# Patient Record
Sex: FEMALE | Race: WHITE | Employment: FULL TIME | URBAN - METROPOLITAN AREA
[De-identification: names, ages, dates, MRNs, and addresses within clinical notes are randomized per-mention and may not be internally consistent; named-entity substitution may affect disease eponyms.]

---

## 2017-01-24 ENCOUNTER — TRANSFERRED RECORDS (OUTPATIENT)
Dept: FAMILY MEDICINE | Facility: CLINIC | Age: 37
End: 2017-01-24

## 2017-02-07 ENCOUNTER — TRANSFERRED RECORDS (OUTPATIENT)
Dept: FAMILY MEDICINE | Facility: CLINIC | Age: 37
End: 2017-02-07

## 2017-04-07 ENCOUNTER — TRANSFERRED RECORDS (OUTPATIENT)
Dept: FAMILY MEDICINE | Facility: CLINIC | Age: 37
End: 2017-04-07

## 2017-04-10 DIAGNOSIS — Z76.0 ENCOUNTER FOR MEDICATION REFILL: ICD-10-CM

## 2017-04-10 RX ORDER — LEVOTHYROXINE SODIUM 100 UG/1
TABLET ORAL
Qty: 30 TABLET | Refills: 0 | Status: SHIPPED | OUTPATIENT
Start: 2017-04-10 | End: 2017-05-17

## 2017-05-17 DIAGNOSIS — Z76.0 ENCOUNTER FOR MEDICATION REFILL: ICD-10-CM

## 2017-05-19 RX ORDER — LEVOTHYROXINE SODIUM 100 UG/1
TABLET ORAL
Qty: 90 TABLET | Refills: 0 | Status: SHIPPED | OUTPATIENT
Start: 2017-05-19 | End: 2017-08-08

## 2017-08-03 NOTE — PROGRESS NOTES
Last TSH : 4.660 ulU/mL on 9/27/2016          2.050 ulU/mL on 2/24/2016          8.78 ulU/mL on 7/31/2014    Currently taking levothyroxine 100 mcg per day.      SUBJECTIVE:                                                    Paulina Diana is a 37 year old female who presents to clinic today for the following health issues:   day off    Obese white female    Hypothyroidism Follow-up      Since last visit, patient describes the following symptoms: Weight stable, no hair loss, no skin changes, no constipation, no loose stools      Amount of exercise or physical activity: 4-5 days/week for an average of 45-60 minutes    Problems taking medications regularly: No    Medication side effects: none    Diet: low fat/cholesterol, low carb diet for weight loss   me    Mood stable  Sleep good 6 hours  Appetite ok  Exercise above    Smoker tried years ago only 3 cig  Etoh occasional/rare  Street drugs/mj no  Caffeine diet coke one can per day    Hobbies: [employer], dog walking, garden    Mole Growth       Duration: x entire life    Description (location/character/radiation): right upper forehead (had it all my life. Other family members also have something similar. Birthmark?) H/o moles removed between shoulder blades and something that needed better margions and cryo of other lesions on arms    Intensity:  Increasing in size since July 2017     Accompanying signs and symptoms: no other sxs     History (similar episodes/previous evaluation): history of mole removals     Precipitating or alleviating factors: none    Therapies tried and outcome: None                 Problem list and histories reviewed & adjusted, as indicated.  Additional history: as documented    Patient Active Problem List   Diagnosis     Atypical nevus     Hypothyroidism     Indication for care in labor and delivery, antepartum     Acquired hypothyroidism     Past Surgical History:   Procedure Laterality Date     DILATION AND CURETTAGE  "SUCTION N/A 5/27/2016    Procedure: DILATION AND CURETTAGE SUCTION;  Surgeon: Natacha Goyal MD;  Location: [facility]     ENT SURGERY      septal repair     LAPAROSCOPIC SALPINGO-OOPHORECTOMY  11/2/2013    Procedure: LAPAROSCOPIC SALPINGO-OOPHORECTOMY;  Single port laparoscopic, Evacuation of hemo-peritonium and Products of Conception;  Surgeon: Peggy Thornton MD;  Location:  OR     NOSE SURGERY      age 12       Social History   Substance Use Topics     Smoking status: Never Smoker     Smokeless tobacco: Never Used      Comment:  on occasion     Alcohol use No      Comment: rare     No family history on file.      Current Outpatient Prescriptions   Medication Sig Dispense Refill     levothyroxine (SYNTHROID/LEVOTHROID) 100 MCG tablet TAKE 1 TABLET BY MOUTH EVERY DAY 90 tablet 0     metFORMIN (GLUCOPHAGE) 1000 MG tablet Take 1,000 mg by mouth 2 times daily (with meals)  8     levothyroxine (SYNTHROID,LEVOTHROID) 100 MCG tablet Take 1 tablet (100 mcg) by mouth daily 30 tablet 0     Allergies   Allergen Reactions     Ceclor Cd [Cefaclor Monohydrate] Hives     Recent Labs   Lab Test  09/27/16   1648  05/15/16   1421  11/15/13   1415  11/01/13   2130   ALT  18   --   30  36   CR  0.85  0.74  0.84  0.86   GFRESTIMATED   --   89  79  77   GFRESTBLACK   --   >90   GFR Calc    >90  >90   POTASSIUM  4.3  3.8  3.7  3.3*      BP Readings from Last 3 Encounters:   08/08/17 120/80   09/27/16 120/84   07/29/16 126/78    Wt Readings from Last 3 Encounters:   05/27/16 119 kg (262 lb 4.8 oz)   05/15/16 108.9 kg (240 lb)   11/22/13 115.7 kg (255 lb)       Estimated body mass index is 43.65 kg/(m^2) as calculated from the following:    Height as of 5/27/16: 1.651 m (5' 5\").    Weight as of 5/27/16: 119 kg (262 lb 4.8 oz).  Weight loss is recommended           Labs reviewed in EPIC          Reviewed and updated as needed this visit by clinical staff     Reviewed and updated as needed this visit by Provider     " "    ROS:  Constitutional, HEENT, cardiovascular, pulmonary, GI, , musculoskeletal, neuro, skin, endocrine and psych systems are negative, except as otherwise noted.      OBJECTIVE:   /80  Pulse 68  Temp 98  F (36.7  C) (Oral)  Resp 16  Ht 1.651 m (5' 5\")  SpO2 98%  There is no height or weight on file to calculate BMI.  GENERAL: healthy, alert and no distress  EYES: Eyes grossly normal to inspection, PERRL and conjunctivae and sclerae normal  HENT: ear canals and TM's normal, nose and mouth without ulcers or lesions  NECK: no adenopathy, no asymmetry, masses, or scars and thyroid normal to palpation  RESP: lungs clear to auscultation - no rales, rhonchi or wheezes  CV: regular rate and rhythm, normal S1 S2, no S3 or S4, no murmur, click or rub, no peripheral edema and peripheral pulses strong  ABDOMEN: soft, nontender, no hepatosplenomegaly, no masses and bowel sounds normal  MS: no gross musculoskeletal defects noted, no edema  SKIN: no suspicious lesions or rashes Dermatofibroma right forehead, multiple benign looking nevi on back, chest, extremities  NEURO: Normal strength and tone, mentation intact and speech normal  PSYCH: mentation appears normal, affect normal/bright  LYMPH: no cervical, supraclavicular, axillary, or inguinal adenopathy    Diagnostic Test Results:  Results for orders placed or performed in visit on 10/11/16   Stool Culture ([facility])   Result Value Ref Range    Salmonella/Shigella Screen Final report     Result 1 Comment     Campylobacter Culture Final report     Result 1 Comment     E. Coli Shiga Toxin EIA Stool Negative Negative    Narrative    Performed at:  01 - [facility] [address], [place], [place]  [id number]  : Cheng Moran MD, Phone:  [phone]   C difficile Toxins A+B  EIA ([facility])   Result Value Ref Range    C. Difficile Toxin A & B, EIA Negative Negative    Narrative    Performed at:  01 - [facility] [address], [place], [place]  " "[id number]  : Cheng Moran MD, Phone:  [phone]   Ova + Parasite Exam ([facility])   Result Value Ref Range    Ova + Parasite Exam Final report     Result 1 Comment     Narrative    Performed at:  02 - [facility] [address] [address], [place], [place]  [id number]  : DEIRDRE Ospina MD, Phone:  [phone]     LMP June 12th PCOS irregular  Pap UTD  No children living  ASSESSMENT/PLAN:     ASSESSMENT / PLAN:  (E03.9) Acquired hypothyroidism  (primary encounter diagnosis)  Comment:   Plan: TSH ([facility]), VENOUS COLLECTION            (Z76.0) Encounter for medication refill  Comment:   Plan: levothyroxine (SYNTHROID/LEVOTHROID) 100 MCG         tablet, VENOUS COLLECTION            (D23.30) Dermatofibroma of face  Comment:   Plan: skin cares sunscreen, hats etc. See dermatology if changing more.    (D22.9) Melanocytic nevus, unspecified location  Comment: many benign looking nevus  Plan: as above        Patient Instructions   Lab today  Refill Thyroid medicine    Estimated body mass index is 43.65 kg/(m^2) as calculated from the following:    Height as of 5/27/16: 1.651 m (5' 5\").    Weight as of 5/27/16: 119 kg (262 lb 4.8 oz).    Watch mole like benign- likely dermatofibroma  Hats and sunscreen        Marissa Zelaya MD  [facility]  "

## 2017-08-08 ENCOUNTER — OFFICE VISIT (OUTPATIENT)
Dept: FAMILY MEDICINE | Facility: CLINIC | Age: 37
End: 2017-08-08

## 2017-08-08 VITALS
TEMPERATURE: 98 F | DIASTOLIC BLOOD PRESSURE: 80 MMHG | HEIGHT: 65 IN | HEART RATE: 68 BPM | OXYGEN SATURATION: 98 % | RESPIRATION RATE: 16 BRPM | SYSTOLIC BLOOD PRESSURE: 120 MMHG

## 2017-08-08 DIAGNOSIS — R79.89 ELEVATED TSH: ICD-10-CM

## 2017-08-08 DIAGNOSIS — D23.30 DERMATOFIBROMA OF FACE: ICD-10-CM

## 2017-08-08 DIAGNOSIS — Z76.0 ENCOUNTER FOR MEDICATION REFILL: ICD-10-CM

## 2017-08-08 DIAGNOSIS — E03.9 ACQUIRED HYPOTHYROIDISM: Primary | ICD-10-CM

## 2017-08-08 DIAGNOSIS — D22.9 MELANOCYTIC NEVUS, UNSPECIFIED LOCATION: ICD-10-CM

## 2017-08-08 PROCEDURE — 99213 OFFICE O/P EST LOW 20 MIN: CPT | Performed by: FAMILY MEDICINE

## 2017-08-08 PROCEDURE — 36415 COLL VENOUS BLD VENIPUNCTURE: CPT | Performed by: FAMILY MEDICINE

## 2017-08-08 RX ORDER — LEVOTHYROXINE SODIUM 100 UG/1
100 TABLET ORAL DAILY
Qty: 90 TABLET | Refills: 3 | Status: SHIPPED | OUTPATIENT
Start: 2017-08-08 | End: 2018-01-06

## 2017-08-08 NOTE — MR AVS SNAPSHOT
"              After Visit Summary   8/8/2017    Paulina Diana    MRN: [id number]           Patient Information     Date Of Birth          1980        Visit Information        Provider Department      8/8/2017 11:15 AM Marissa Zelaya MD [facility]        Today's Diagnoses     Acquired hypothyroidism    -  1    Encounter for medication refill          Care Instructions    Lab today  Refill Thyroid medicine    Estimated body mass index is 43.65 kg/(m^2) as calculated from the following:    Height as of 5/27/16: 1.651 m (5' 5\").    Weight as of 5/27/16: 119 kg (262 lb 4.8 oz).    Watch mole like benign- likely dermatofibroma  Hats and sunscreen            Follow-ups after your visit        Who to contact     If you have questions or need follow up information about today's clinic visit or your schedule please contact [facility] directly at [phone].  Normal or non-critical lab and imaging results will be communicated to you by [employer], letter or phone within 4 business days after the clinic has received the results. If you do not hear from us within 7 days, please contact the clinic through [employer] or phone. If you have a critical or abnormal lab result, we will notify you by phone as soon as possible.  Submit refill requests through [employer] or call your pharmacy and they will forward the refill request to us. Please allow 3 business days for your refill to be completed.          Additional Information About Your Visit        [employer] Information     [employer] gives you secure access to your electronic health record. If you see a primary care provider, you can also send messages to your care team and make appointments. If you have questions, please call your primary care clinic.  If you do not have a primary care provider, please call [phone] and they will assist you.        Care EveryWhere ID     This is your Care EveryWhere ID. This could be used by other organizations to access " "your [place] medical records  [id number]        Your Vitals Were     Pulse Temperature Respirations Height Pulse Oximetry       68 98  F (36.7  C) (Oral) 16 1.651 m (5' 5\") 98%        Blood Pressure from Last 3 Encounters:   08/08/17 120/80   09/27/16 120/84   07/29/16 126/78    Weight from Last 3 Encounters:   05/27/16 119 kg (262 lb 4.8 oz)   05/15/16 108.9 kg (240 lb)   11/22/13 115.7 kg (255 lb)              We Performed the Following     TSH ([facility])          Today's Medication Changes          These changes are accurate as of: 8/8/17 11:41 AM.  If you have any questions, ask your nurse or doctor.               These medicines have changed or have updated prescriptions.        Dose/Directions    * levothyroxine 100 MCG tablet   Commonly known as:  SYNTHROID/LEVOTHROID   This may have changed:  Another medication with the same name was changed. Make sure you understand how and when to take each.   Used for:  Issue of repeat prescription   Changed by:  Ismael Ribera MD        Dose:  100 mcg   Take 1 tablet (100 mcg) by mouth daily   Quantity:  30 tablet   Refills:  0       * levothyroxine 100 MCG tablet   Commonly known as:  SYNTHROID/LEVOTHROID   This may have changed:  See the new instructions.   Used for:  Encounter for medication refill   Changed by:  Marissa Zelaya MD        Dose:  100 mcg   Take 1 tablet (100 mcg) by mouth daily   Quantity:  90 tablet   Refills:  3       * Notice:  This list has 2 medication(s) that are the same as other medications prescribed for you. Read the directions carefully, and ask your doctor or other care provider to review them with you.         Where to get your medicines      These medications were sent to [facility] [id number] [place] [address] [address] AT [facility] [address] [facility] [address]     Phone:  [phone]     levothyroxine 100 MCG tablet                Primary Care Provider Office Phone # Fax #    " Ismael Ribera -[phone]       [facility] [address]  [facility] [address]        Equal Access to Services     BRANT CHUN : Zainab acrol goode anshu Moreau, wanaseemda luqeliza, qaybta kaalmada brennon, mo gamez laanuelmelinda debra. So [facility] [phone].    ATENCIÓN: Si habla español, tiene a thompson disposición servicios gratuitos de asistencia lingüística. Pawel al [phone].    We comply with applicable federal civil rights laws and [place] laws. We do not discriminate on the basis of race, color, national origin, age, disability sex, sexual orientation or gender identity.            Thank you!     Thank you for choosing [facility]  for your care. Our goal is always to provide you with excellent care. Hearing back from our patients is one way we can continue to improve our services. Please take a few minutes to complete the written survey that you may receive in the mail after your visit with us. Thank you!             Your Updated Medication List - Protect others around you: Learn how to safely use, store and throw away your medicines at www.disposemymeds.org.          This list is accurate as of: 8/8/17 11:41 AM.  Always use your most recent med list.                   Brand Name Dispense Instructions for use Diagnosis    * levothyroxine 100 MCG tablet    SYNTHROID/LEVOTHROID    30 tablet    Take 1 tablet (100 mcg) by mouth daily    Issue of repeat prescription       * levothyroxine 100 MCG tablet    SYNTHROID/LEVOTHROID    90 tablet    Take 1 tablet (100 mcg) by mouth daily    Encounter for medication refill       metFORMIN 1000 MG tablet    GLUCOPHAGE     Take 1,000 mg by mouth 2 times daily (with meals)        * Notice:  This list has 2 medication(s) that are the same as other medications prescribed for you. Read the directions carefully, and ask your doctor or other care provider to review them with you.

## 2017-08-08 NOTE — PATIENT INSTRUCTIONS
"Lab today  Refill Thyroid medicine    Estimated body mass index is 43.65 kg/(m^2) as calculated from the following:    Height as of 5/27/16: 1.651 m (5' 5\").    Weight as of 5/27/16: 119 kg (262 lb 4.8 oz).    Watch mole like benign- likely dermatofibroma  Hats and sunscreen    "

## 2017-08-09 LAB — TSH BLD-ACNC: 14.99 UIU/ML (ref 0.45–4.5)

## 2017-08-12 LAB — T4 FREE SERPL-MCNC: 0.78 NG/DL (ref 0.82–1.77)

## 2017-09-06 DIAGNOSIS — Z76.0 ENCOUNTER FOR MEDICATION REFILL: ICD-10-CM

## 2017-09-07 RX ORDER — LEVOTHYROXINE SODIUM 100 UG/1
TABLET ORAL
Qty: 90 TABLET | Refills: 0 | Status: SHIPPED | OUTPATIENT
Start: 2017-09-07 | End: 2018-01-08

## 2018-01-05 ENCOUNTER — OFFICE VISIT (OUTPATIENT)
Dept: URGENT CARE | Facility: URGENT CARE | Age: 38
End: 2018-01-05
Payer: COMMERCIAL

## 2018-01-05 VITALS
OXYGEN SATURATION: 100 % | HEART RATE: 90 BPM | TEMPERATURE: 98.5 F | RESPIRATION RATE: 18 BRPM | WEIGHT: 277.5 LBS | BODY MASS INDEX: 46.18 KG/M2 | DIASTOLIC BLOOD PRESSURE: 89 MMHG | SYSTOLIC BLOOD PRESSURE: 134 MMHG

## 2018-01-05 DIAGNOSIS — M79.10 MYALGIA: Primary | ICD-10-CM

## 2018-01-05 DIAGNOSIS — Z33.1 PREGNANCY, INCIDENTAL: ICD-10-CM

## 2018-01-05 DIAGNOSIS — E03.9 ACQUIRED HYPOTHYROIDISM: ICD-10-CM

## 2018-01-05 LAB
ALBUMIN SERPL-MCNC: 3.2 G/DL (ref 3.4–5)
ALP SERPL-CCNC: 71 U/L (ref 40–150)
ALT SERPL W P-5'-P-CCNC: 15 U/L (ref 0–50)
ANION GAP SERPL CALCULATED.3IONS-SCNC: 6 MMOL/L (ref 3–14)
AST SERPL W P-5'-P-CCNC: 17 U/L (ref 0–45)
BASOPHILS # BLD AUTO: 0 10E9/L (ref 0–0.2)
BASOPHILS NFR BLD AUTO: 0.4 %
BILIRUB SERPL-MCNC: 0.3 MG/DL (ref 0.2–1.3)
BUN SERPL-MCNC: 12 MG/DL (ref 7–30)
CALCIUM SERPL-MCNC: 8.2 MG/DL (ref 8.5–10.1)
CHLORIDE SERPL-SCNC: 107 MMOL/L (ref 94–109)
CO2 SERPL-SCNC: 28 MMOL/L (ref 20–32)
CREAT SERPL-MCNC: 0.75 MG/DL (ref 0.52–1.04)
DIFFERENTIAL METHOD BLD: ABNORMAL
EOSINOPHIL # BLD AUTO: 0.3 10E9/L (ref 0–0.7)
EOSINOPHIL NFR BLD AUTO: 2.4 %
ERYTHROCYTE [DISTWIDTH] IN BLOOD BY AUTOMATED COUNT: 13.9 % (ref 10–15)
ERYTHROCYTE [SEDIMENTATION RATE] IN BLOOD BY WESTERGREN METHOD: 32 MM/H (ref 0–20)
GFR SERPL CREATININE-BSD FRML MDRD: 88 ML/MIN/1.7M2
GLUCOSE SERPL-MCNC: 90 MG/DL (ref 70–99)
HCG SERPL QL: POSITIVE
HCT VFR BLD AUTO: 37.5 % (ref 35–47)
HGB BLD-MCNC: 11.6 G/DL (ref 11.7–15.7)
LYMPHOCYTES # BLD AUTO: 2 10E9/L (ref 0.8–5.3)
LYMPHOCYTES NFR BLD AUTO: 19.2 %
MCH RBC QN AUTO: 29 PG (ref 26.5–33)
MCHC RBC AUTO-ENTMCNC: 30.9 G/DL (ref 31.5–36.5)
MCV RBC AUTO: 94 FL (ref 78–100)
MONOCYTES # BLD AUTO: 0.7 10E9/L (ref 0–1.3)
MONOCYTES NFR BLD AUTO: 6.5 %
NEUTROPHILS # BLD AUTO: 7.6 10E9/L (ref 1.6–8.3)
NEUTROPHILS NFR BLD AUTO: 71.5 %
PLATELET # BLD AUTO: 393 10E9/L (ref 150–450)
POTASSIUM SERPL-SCNC: 4.1 MMOL/L (ref 3.4–5.3)
PROT SERPL-MCNC: 7.1 G/DL (ref 6.8–8.8)
RBC # BLD AUTO: 4 10E12/L (ref 3.8–5.2)
SODIUM SERPL-SCNC: 141 MMOL/L (ref 133–144)
TSH SERPL DL<=0.005 MIU/L-ACNC: 17.79 MU/L (ref 0.4–4)
WBC # BLD AUTO: 10.6 10E9/L (ref 4–11)

## 2018-01-05 PROCEDURE — 86747 PARVOVIRUS ANTIBODY: CPT | Mod: 90 | Performed by: FAMILY MEDICINE

## 2018-01-05 PROCEDURE — 84703 CHORIONIC GONADOTROPIN ASSAY: CPT | Performed by: FAMILY MEDICINE

## 2018-01-05 PROCEDURE — 80050 GENERAL HEALTH PANEL: CPT | Performed by: FAMILY MEDICINE

## 2018-01-05 PROCEDURE — 86618 LYME DISEASE ANTIBODY: CPT | Performed by: FAMILY MEDICINE

## 2018-01-05 PROCEDURE — 36415 COLL VENOUS BLD VENIPUNCTURE: CPT | Performed by: FAMILY MEDICINE

## 2018-01-05 PROCEDURE — 85652 RBC SED RATE AUTOMATED: CPT | Performed by: FAMILY MEDICINE

## 2018-01-05 PROCEDURE — 99214 OFFICE O/P EST MOD 30 MIN: CPT | Performed by: FAMILY MEDICINE

## 2018-01-05 PROCEDURE — 99000 SPECIMEN HANDLING OFFICE-LAB: CPT | Performed by: FAMILY MEDICINE

## 2018-01-05 NOTE — NURSING NOTE
"Chief Complaint   Patient presents with     Urgent Care     Pt states fibromyalgia 3x weeks        Initial /89  Pulse 90  Temp 98.5  F (36.9  C) (Oral)  Resp 18  Wt 277 lb 8 oz (125.9 kg)  SpO2 100%  BMI 46.18 kg/m2 Estimated body mass index is 46.18 kg/(m^2) as calculated from the following:    Height as of 8/8/17: 5' 5\" (1.651 m).    Weight as of this encounter: 277 lb 8 oz (125.9 kg).  Medication Reconciliation: complete      "

## 2018-01-05 NOTE — MR AVS SNAPSHOT
After Visit Summary   1/5/2018    Paulina Diana    MRN: [id number]           Patient Information     Date Of Birth          1980        Visit Information        Provider Department      1/5/2018 4:35 PM Mechelle De Anda MD [facility]        Today's Diagnoses     Myalgia    -  1    Acquired hypothyroidism        Pregnancy, incidental          Care Instructions    -Use Tylenol, only as directed.  -Avoid Ibuprofen and similar medications.  -Please stop taking Metformin (Glucophage) if you are still taking it.  -We will consider increasing your Synthroid once we have verified your current dose.  -Follow up with OB/GYN and Endocrinology early next week, as advised.  -Follow up immediately if:  severe/worsening pain, vaginal bleeding, progressive weakness, or other severe/emergent symptoms,           Follow-ups after your visit        Who to contact     If you have questions or need follow up information about today's clinic visit or your schedule please contact [facility] directly at [phone].  Normal or non-critical lab and imaging results will be communicated to you by [employer], letter or phone within 4 business days after the clinic has received the results. If you do not hear from us within 7 days, please contact the clinic through [employer] or phone. If you have a critical or abnormal lab result, we will notify you by phone as soon as possible.  Submit refill requests through [employer] or call your pharmacy and they will forward the refill request to us. Please allow 3 business days for your refill to be completed.          Additional Information About Your Visit        [employer] Information     [employer] gives you secure access to your electronic health record. If you see a primary care provider, you can also send messages to your care team and make appointments. If you have questions, please call your primary care clinic.  If  you do not have a primary care provider, please call [phone] and they will assist you.        Care EveryWhere ID     This is your Care EveryWhere ID. This could be used by other organizations to access your [place] medical records  [id number]        Your Vitals Were     Pulse Temperature Respirations Pulse Oximetry BMI (Body Mass Index)       90 98.5  F (36.9  C) (Oral) 18 100% 46.18 kg/m2        Blood Pressure from Last 3 Encounters:   01/05/18 134/89   08/08/17 120/80   09/27/16 120/84    Weight from Last 3 Encounters:   01/05/18 277 lb 8 oz (125.9 kg)   05/27/16 262 lb 4.8 oz (119 kg)   05/15/16 240 lb (108.9 kg)              We Performed the Following     CBC with platelets differential     Comprehensive metabolic panel     Erythrocyte sedimentation rate auto     HCG qualitative     Lyme Disease Glo with reflex to WB Serum     Parvovirus B19 antibodies IgG IgM     TSH        Primary Care Provider Office Phone # Fax #    Ismael Ribera [phone] [address]  [facility] [address]        Equal Access to Services     [facility]: Hadii aad ku hadasho Soomaali, waaxda luqadaha, qaybta kaalmada adeegyada, mo walker . So [facility] [phone].    ATENCIÓN: Si habla español, tiene a thompson disposición servicios gratuitos de asistencia lingüística. Pawel al [phone].    We comply with applicable federal civil rights laws and [place] laws. We do not discriminate on the basis of race, color, national origin, age, disability, sex, sexual orientation, or gender identity.            Thank you!     Thank you for choosing [facility]  for your care. Our goal is always to provide you with excellent care. Hearing back from our patients is one way we can continue to improve our services. Please take a few minutes to complete the written survey that you may receive in the mail after your visit with us. Thank you!              Your Updated Medication List - Protect others around you: Learn how to safely use, store and throw away your medicines at www.disposemymeds.org.          This list is accurate as of: 1/5/18 11:59 PM.  Always use your most recent med list.                   Brand Name Dispense Instructions for use Diagnosis    levothyroxine 100 MCG tablet    SYNTHROID/LEVOTHROID    90 tablet    TAKE 1 TABLET BY MOUTH EVERY DAY    Encounter for medication refill       metFORMIN 1000 MG tablet    GLUCOPHAGE     Take 1,000 mg by mouth 2 times daily (with meals)

## 2018-01-06 NOTE — PATIENT INSTRUCTIONS
-Use Tylenol, only as directed.  -Avoid Ibuprofen and similar medications.  -Please stop taking Metformin (Glucophage) if you are still taking it.  -We will consider increasing your Synthroid once we have verified your current dose.  -Follow up with OB/GYN and Endocrinology early next week, as advised.  -Follow up immediately if:  severe/worsening pain, vaginal bleeding, progressive weakness, or other severe/emergent symptoms,

## 2018-01-06 NOTE — PROGRESS NOTES
"CC:    Chief Complaint   Patient presents with     Urgent Care     Pt states fibromyalgia 3x weeks        SUBJECTIVE:  The patient is a 37 year old female, who presents with myalgias, morning stiffness (lasting 1-2 hours each morning), and fatigue.  Symptoms started 3 weeks ago, but they are worse today.  Pain mainly involves the patient's shoulders and hips/outer thighs.  Patient is typically OK after she \"gets going\" and takes Ibuprofen, but she decided to present to the Urgent Care today, as her  had to help her out of bed.  Patient feels subjectively weak at times, secondary to pain.  No specific weakness noted.  Slight cough and nasal congestion currently.  Chronic chills are unchanged.  No fever, nausea, vomiting, headache, neck pain/stiffness, recent sore throat, back pain/stiffness, paresthesias, joint erythema/edema, chest pain, shortness of breath, or bowel or bladder changes.      Patient had some abdominal cramping just prior to the onset of her symptoms 3 weeks ago.  Patient had 2 episodes of abdominal cramping earlier today, lasting < 1 minute/episode.  No vaginal bleeding.  Patient has been sleeping more than usual.  She has a known history of hypothyroidism and PCOS, with thyroid labs last done 08/2017.  Weight has increased 7 pounds in the past 3 weeks, which patient attributes to an increased appetite.  Patient denies social stressors or other anxiety/depression symptoms.  Patient is wondering about the possibility of fibromyalgia.  No personal or family history of rheumatologic or neurologic disease.  No recent travel, vaccines, or exposures.  Patient left work early today, due to her symptoms.  She would like to have labs and a CBC checked.    ROS:  Patient and her  are not taking steps to avoid pregnancy, with LMP ~ 12/01/2017.  Menses have been a bit \"irregular\" recently.    Past Medical History:   Diagnosis Date     Thyroid disease     hypothyroid     History of PCOS, per " patient.    Current Outpatient Prescriptions   Medication Sig Dispense Refill     levothyroxine (SYNTHROID/LEVOTHROID) 100 MCG tablet TAKE 1 TABLET BY MOUTH EVERY DAY 90 tablet 0            metFORMIN (GLUCOPHAGE) 1000 MG tablet Take 1,000 mg by mouth 2 times daily (with meals)  8              Allergies   Allergen Reactions     Ceclor Cd [Cefaclor Monohydrate] Hives     Social History   Substance Use Topics     Smoking status: Never Smoker     Smokeless tobacco: Never Used      Comment:  on occasion     Alcohol use No      Comment: rare       OBJECTIVE:  /89  Pulse 90  Temp 98.5  F (36.9  C) (Oral)  Resp 18  Wt 277 lb 8 oz (125.9 kg)  SpO2 100%  BMI 46.18 kg/m2  GENERAL APPEARANCE:  Awake, alert, and in no acute distress.  PSYCHIATRIC:  Tearful discussing her symptoms, but smiles at times.  No obvious depression or anxiety.  HEENT:  Sclera anicteric.  No conjunctivitis.  PERRLA.  Extraocular movements are intact.  Bilateral TM's and canals are within normal limits.  No obvious nasal congestion.  No erythema, edema, or exudates of the oral mucosa or posterior pharynx.  Mucous membranes moist.  NECK:  Spontaneous full range of motion.  No thyromegaly or mass.  No lymphadenopathy.  No nuchal rigidity or neck tenderness.  HEART:  Normal S1, S2.  Regular rate and rhythm.  No murmurs, rubs, or gallops.  LUNGS:  No respiratory distress.  No wheezes, rales, or rhonchi.  ABDOMEN:  Not distended.  Obese.  Soft.  Not tender.  No mass.  BACK:  Not tender to palpation.  EXTREMITIES:  Moves 4 extremities.   5/5 strength x 4 extremities.  No edema.  NEUROLOGIC:  Gait within normal limits.  2/5 and symmetric reflexes in the upper and lower extremities.  Cranial nerves II-XII grossly intact.  No facial droop or acute neurologic deficits.  SKIN:  No rash or diaphoresis.    IMAGING:  Not performed.    LABORATORY:  Office Visit on 01/05/2018   Component Date Value Ref Range Status     WBC 01/05/2018 10.6  4.0 - 11.0  10e9/L Final     RBC Count 01/05/2018 4.00  3.8 - 5.2 10e12/L Final     Hemoglobin 01/05/2018 11.6* 11.7 - 15.7 g/dL Final     Hematocrit 01/05/2018 37.5  35.0 - 47.0 % Final     MCV 01/05/2018 94  78 - 100 fl Final     MCH 01/05/2018 29.0  26.5 - 33.0 pg Final     MCHC 01/05/2018 30.9* 31.5 - 36.5 g/dL Final     RDW 01/05/2018 13.9  10.0 - 15.0 % Final     Platelet Count 01/05/2018 393  150 - 450 10e9/L Final     Diff Method 01/05/2018 Automated Method   Final     % Neutrophils 01/05/2018 71.5  % Final     % Lymphocytes 01/05/2018 19.2  % Final     % Monocytes 01/05/2018 6.5  % Final     % Eosinophils 01/05/2018 2.4  % Final     % Basophils 01/05/2018 0.4  % Final     Absolute Neutrophil 01/05/2018 7.6  1.6 - 8.3 10e9/L Final     Absolute Lymphocytes 01/05/2018 2.0  0.8 - 5.3 10e9/L Final     Absolute Monocytes 01/05/2018 0.7  0.0 - 1.3 10e9/L Final     Absolute Eosinophils 01/05/2018 0.3  0.0 - 0.7 10e9/L Final     Absolute Basophils 01/05/2018 0.0  0.0 - 0.2 10e9/L Final     Sodium 01/05/2018 141  133 - 144 mmol/L Final     Potassium 01/05/2018 4.1  3.4 - 5.3 mmol/L Final     Chloride 01/05/2018 107  94 - 109 mmol/L Final     Carbon Dioxide 01/05/2018 28  20 - 32 mmol/L Final     Anion Gap 01/05/2018 6  3 - 14 mmol/L Final     Glucose 01/05/2018 90  70 - 99 mg/dL Final     Urea Nitrogen 01/05/2018 12  7 - 30 mg/dL Final     Creatinine 01/05/2018 0.75  0.52 - 1.04 mg/dL Final     GFR Estimate 01/05/2018 88  >60 mL/min/1.7m2 Final    Non  GFR Calc     GFR Estimate If Black 01/05/2018 >90  >60 mL/min/1.7m2 Final    African American GFR Calc     Calcium 01/05/2018 8.2* 8.5 - 10.1 mg/dL Final     Bilirubin Total 01/05/2018 0.3  0.2 - 1.3 mg/dL Final     Albumin 01/05/2018 3.2* 3.4 - 5.0 g/dL Final     Protein Total 01/05/2018 7.1  6.8 - 8.8 g/dL Final     Alkaline Phosphatase 01/05/2018 71  40 - 150 U/L Final     ALT 01/05/2018 15  0 - 50 U/L Final     AST 01/05/2018 17  0 - 45 U/L Final     Sed Rate  01/05/2018 32* 0 - 20 mm/h Final     TSH 01/05/2018 17.79* 0.40 - 4.00 mU/L Final     HCG Qualitative Serum 01/05/2018 Positive* NEG^Negative Final    Comment: This test is for screening purposes.  Results should be interpreted along with   the clinical picture.  Confirmation testing is available if warranted by   ordering [id number], HCG Quantitative Pregnancy.           ASSESSMENT:    1. Myalgias.  Patient has a known history of hypothyroidism and PCOS (on Synthroid), with incidental pregnancy diagnosed this evening.  TSH is elevated (17.79) currently, with mildly elevated ESR 32.  Differential includes (but is not limited to) viral illness and Lyme Disease.  Doubt rhabdomyolysis, given symptom onset 3 weeks ago, with benign Creatinine today.     - CBC with platelets differential     - Parvovirus B19 antibodies IgG IgM - Pending at time of dictation.  - Lyme Disease Glo with reflex to WB Serum - Pending at time of dictation.  - Comprehensive metabolic panel  - Erythrocyte sedimentation rate auto  - TSH  - HCG qualitative    2. Acquired hypothyroidism, on Synthroid.  TSH is currently elevated (17.79).      3. Pregnancy, incidental, with LMP ~ 12/01/2017.     PLAN:  -Discussed risks/benefits of treatment strategies.    Patient Instructions   -Use Tylenol, only as directed.  -Avoid Ibuprofen and similar medications.  -Please stop taking Metformin (Glucophage) if you are still taking it.  -We will consider increasing your Synthroid once we have verified your current dose.  -Follow up with OB/GYN and Endocrinology early next week, as advised.  -Follow up immediately if:  severe/worsening pain, vaginal bleeding, progressive weakness, or other severe/emergent symptoms,     The patient was discharged ambulatory and in stable condition post discussion of follow up.

## 2018-01-07 LAB
B19V IGG SER IA-ACNC: 5.67 IV
B19V IGM SER IA-ACNC: 0.35 IV

## 2018-01-08 ENCOUNTER — TELEPHONE (OUTPATIENT)
Dept: FAMILY MEDICINE | Facility: CLINIC | Age: 38
End: 2018-01-08

## 2018-01-08 ENCOUNTER — TELEPHONE (OUTPATIENT)
Dept: URGENT CARE | Facility: URGENT CARE | Age: 38
End: 2018-01-08

## 2018-01-08 DIAGNOSIS — E03.9 ACQUIRED HYPOTHYROIDISM: Primary | ICD-10-CM

## 2018-01-08 DIAGNOSIS — Z76.0 ENCOUNTER FOR MEDICATION REFILL: ICD-10-CM

## 2018-01-08 LAB — B BURGDOR IGG+IGM SER QL: 0.03 (ref 0–0.89)

## 2018-01-08 RX ORDER — LEVOTHYROXINE SODIUM 125 UG/1
125 TABLET ORAL DAILY
Qty: 30 TABLET | Refills: 3 | Status: SHIPPED | OUTPATIENT
Start: 2018-01-08 | End: 2018-07-12

## 2018-01-08 NOTE — TELEPHONE ENCOUNTER
Inform patient of lab results:    Lyme testing was negative  Parvovirus shows past infection but no antibodies for new or active infection.  Her TSH levels are elevated and show hypothyroidism.  It appears she may have already been prescribed medication for this.    Advised to follow up with OB/GYN due to pregnancy being positive .    Thank you    Trevon Mcmillan [facility] PAC     Results for orders placed or performed in visit on 01/05/18   CBC with platelets differential   Result Value Ref Range    WBC 10.6 4.0 - 11.0 10e9/L    RBC Count 4.00 3.8 - 5.2 10e12/L    Hemoglobin 11.6 (L) 11.7 - 15.7 g/dL    Hematocrit 37.5 35.0 - 47.0 %    MCV 94 78 - 100 fl    MCH 29.0 26.5 - 33.0 pg    MCHC 30.9 (L) 31.5 - 36.5 g/dL    RDW 13.9 10.0 - 15.0 %    Platelet Count 393 150 - 450 10e9/L    Diff Method Automated Method     % Neutrophils 71.5 %    % Lymphocytes 19.2 %    % Monocytes 6.5 %    % Eosinophils 2.4 %    % Basophils 0.4 %    Absolute Neutrophil 7.6 1.6 - 8.3 10e9/L    Absolute Lymphocytes 2.0 0.8 - 5.3 10e9/L    Absolute Monocytes 0.7 0.0 - 1.3 10e9/L    Absolute Eosinophils 0.3 0.0 - 0.7 10e9/L    Absolute Basophils 0.0 0.0 - 0.2 10e9/L   Parvovirus B19 antibodies IgG IgM   Result Value Ref Range    Parvovirus B19 IgG 5.67 (H) <=0.89 IV    Parvovirus B19 IgM 0.35 <=0.89 IV   Lyme Disease Glo with reflex to WB Serum   Result Value Ref Range    Lyme Disease Antibodies Serum 0.03 0.00 - 0.89   Comprehensive metabolic panel   Result Value Ref Range    Sodium 141 133 - 144 mmol/L    Potassium 4.1 3.4 - 5.3 mmol/L    Chloride 107 94 - 109 mmol/L    Carbon Dioxide 28 20 - 32 mmol/L    Anion Gap 6 3 - 14 mmol/L    Glucose 90 70 - 99 mg/dL    Urea Nitrogen 12 7 - 30 mg/dL    Creatinine 0.75 0.52 - 1.04 mg/dL    GFR Estimate 88 >60 mL/min/1.7m2    GFR Estimate If Black >90 >60 mL/min/1.7m2    Calcium 8.2 (L) 8.5 - 10.1 mg/dL    Bilirubin Total 0.3 0.2 - 1.3 mg/dL    Albumin 3.2 (L) 3.4 - 5.0 g/dL    Protein Total 7.1 6.8 - 8.8 g/dL     Alkaline Phosphatase 71 40 - 150 U/L    ALT 15 0 - 50 U/L    AST 17 0 - 45 U/L   Erythrocyte sedimentation rate auto   Result Value Ref Range    Sed Rate 32 (H) 0 - 20 mm/h   TSH   Result Value Ref Range    TSH 17.79 (H) 0.40 - 4.00 mU/L   HCG qualitative   Result Value Ref Range    HCG Qualitative Serum Positive (A) NEG^Negative

## 2018-01-08 NOTE — TELEPHONE ENCOUNTER
Patient calls asking if we can increase her levothyroxine dose based on elevated TSH of 17.79 done at urgent care 1/5/18. Currently taking levothyroxine 100mcg q AM consistently - denies missed doses.   Her previous TSH done in our clinic 8/2017=14.990 and dose was increased from 75mcg to current 100mcg.     Also of note is incidental finding of positive pregnancy at 1/5/18 visit. Patient has PCOS and was not using birth control. Has history 2 failed pregnancies in past 2 years. States went to OB today for lab visit and serum Hcg was in in low 304 with low progesterone level. OB prescribed progesterone to start now and repeat serum hcg in 3 days.    Plan: per Dr. Zelaya (oncall for Dr. Ribera) -- increase levothyroxine to 125mcg q am and repeat TSH and Free T4 in 8 weeks unless OB recommends sooner. Rx sent to pharmacy, future orders placed and patient informed.  Tiffanie Zarco RN

## 2018-01-28 ENCOUNTER — SURGERY (OUTPATIENT)
Age: 38
End: 2018-01-28
Payer: COMMERCIAL

## 2018-01-28 ENCOUNTER — ANESTHESIA (OUTPATIENT)
Dept: SURGERY | Facility: CLINIC | Age: 38
End: 2018-01-28
Payer: COMMERCIAL

## 2018-01-28 ENCOUNTER — APPOINTMENT (OUTPATIENT)
Dept: ULTRASOUND IMAGING | Facility: CLINIC | Age: 38
End: 2018-01-28
Attending: EMERGENCY MEDICINE
Payer: COMMERCIAL

## 2018-01-28 ENCOUNTER — HOSPITAL ENCOUNTER (OUTPATIENT)
Facility: CLINIC | Age: 38
Discharge: HOME | End: 2018-01-30
Attending: EMERGENCY MEDICINE | Admitting: OBSTETRICS & GYNECOLOGY
Payer: COMMERCIAL

## 2018-01-28 ENCOUNTER — ANESTHESIA EVENT (OUTPATIENT)
Dept: SURGERY | Facility: CLINIC | Age: 38
End: 2018-01-28
Payer: COMMERCIAL

## 2018-01-28 DIAGNOSIS — O00.90 ECTOPIC PREGNANCY WITHOUT INTRAUTERINE PREGNANCY, UNSPECIFIED LOCATION: ICD-10-CM

## 2018-01-28 PROBLEM — O00.109 ECTOPIC PREGNANCY, TUBAL: Status: ACTIVE | Noted: 2018-01-28

## 2018-01-28 LAB
ANION GAP SERPL CALCULATED.3IONS-SCNC: 7 MMOL/L (ref 3–14)
B-HCG SERPL-ACNC: ABNORMAL IU/L (ref 0–5)
BASOPHILS # BLD AUTO: 0 10E9/L (ref 0–0.2)
BASOPHILS NFR BLD AUTO: 0.4 %
BUN SERPL-MCNC: 15 MG/DL (ref 7–30)
CALCIUM SERPL-MCNC: 8.2 MG/DL (ref 8.5–10.1)
CHLORIDE SERPL-SCNC: 107 MMOL/L (ref 94–109)
CO2 SERPL-SCNC: 26 MMOL/L (ref 20–32)
CREAT SERPL-MCNC: 0.86 MG/DL (ref 0.52–1.04)
DIFFERENTIAL METHOD BLD: ABNORMAL
EOSINOPHIL # BLD AUTO: 0.2 10E9/L (ref 0–0.7)
EOSINOPHIL NFR BLD AUTO: 2.6 %
ERYTHROCYTE [DISTWIDTH] IN BLOOD BY AUTOMATED COUNT: 14.2 % (ref 10–15)
GFR SERPL CREATININE-BSD FRML MDRD: 75 ML/MIN/1.7M2
GLUCOSE SERPL-MCNC: 101 MG/DL (ref 70–99)
HCT VFR BLD AUTO: 36.9 % (ref 35–47)
HGB BLD-MCNC: 11.6 G/DL (ref 11.7–15.7)
HGB BLD-MCNC: 9.4 G/DL (ref 11.7–15.7)
IMM GRANULOCYTES # BLD: 0 10E9/L (ref 0–0.4)
IMM GRANULOCYTES NFR BLD: 0.2 %
INR PPP: 0.93 (ref 0.86–1.14)
LYMPHOCYTES # BLD AUTO: 2 10E9/L (ref 0.8–5.3)
LYMPHOCYTES NFR BLD AUTO: 23.4 %
MCH RBC QN AUTO: 28.9 PG (ref 26.5–33)
MCHC RBC AUTO-ENTMCNC: 31.4 G/DL (ref 31.5–36.5)
MCV RBC AUTO: 92 FL (ref 78–100)
MONOCYTES # BLD AUTO: 0.5 10E9/L (ref 0–1.3)
MONOCYTES NFR BLD AUTO: 5.9 %
NEUTROPHILS # BLD AUTO: 5.7 10E9/L (ref 1.6–8.3)
NEUTROPHILS NFR BLD AUTO: 67.5 %
NRBC # BLD AUTO: 0 10*3/UL
NRBC BLD AUTO-RTO: 0 /100
PLATELET # BLD AUTO: 330 10E9/L (ref 150–450)
POTASSIUM SERPL-SCNC: 3.6 MMOL/L (ref 3.4–5.3)
RADIOLOGIST FLAGS: ABNORMAL
RBC # BLD AUTO: 4.02 10E12/L (ref 3.8–5.2)
SODIUM SERPL-SCNC: 140 MMOL/L (ref 133–144)
WBC # BLD AUTO: 8.5 10E9/L (ref 4–11)

## 2018-01-28 PROCEDURE — 96375 TX/PRO/DX INJ NEW DRUG ADDON: CPT

## 2018-01-28 PROCEDURE — 40000935 ZZH STATISTIC OUTPATIENT (NON-OBS) EVE

## 2018-01-28 PROCEDURE — 25800025 ZZH RX 258: Performed by: OBSTETRICS & GYNECOLOGY

## 2018-01-28 PROCEDURE — 88305 TISSUE EXAM BY PATHOLOGIST: CPT | Mod: 26 | Performed by: OBSTETRICS & GYNECOLOGY

## 2018-01-28 PROCEDURE — 71000013 ZZH RECOVERY PHASE 1 LEVEL 1 EA ADDTL HR: Performed by: OBSTETRICS & GYNECOLOGY

## 2018-01-28 PROCEDURE — 85018 HEMOGLOBIN: CPT | Performed by: OBSTETRICS & GYNECOLOGY

## 2018-01-28 PROCEDURE — 25000128 H RX IP 250 OP 636: Performed by: NURSE ANESTHETIST, CERTIFIED REGISTERED

## 2018-01-28 PROCEDURE — 40000170 ZZH STATISTIC PRE-PROCEDURE ASSESSMENT II: Performed by: OBSTETRICS & GYNECOLOGY

## 2018-01-28 PROCEDURE — 27210995 ZZH RX 272: Performed by: OBSTETRICS & GYNECOLOGY

## 2018-01-28 PROCEDURE — 25000566 ZZH SEVOFLURANE, EA 15 MIN: Performed by: OBSTETRICS & GYNECOLOGY

## 2018-01-28 PROCEDURE — 25000132 ZZH RX MED GY IP 250 OP 250 PS 637: Performed by: OBSTETRICS & GYNECOLOGY

## 2018-01-28 PROCEDURE — 25000128 H RX IP 250 OP 636: Performed by: EMERGENCY MEDICINE

## 2018-01-28 PROCEDURE — 96374 THER/PROPH/DIAG INJ IV PUSH: CPT

## 2018-01-28 PROCEDURE — 36415 COLL VENOUS BLD VENIPUNCTURE: CPT | Performed by: OBSTETRICS & GYNECOLOGY

## 2018-01-28 PROCEDURE — 25000128 H RX IP 250 OP 636: Performed by: ANESTHESIOLOGY

## 2018-01-28 PROCEDURE — 27210794 ZZH OR GENERAL SUPPLY STERILE: Performed by: OBSTETRICS & GYNECOLOGY

## 2018-01-28 PROCEDURE — 40000936 ZZH STATISTIC OUTPATIENT (NON-OBS) NIGHT

## 2018-01-28 PROCEDURE — 99285 EMERGENCY DEPT VISIT HI MDM: CPT | Mod: 25

## 2018-01-28 PROCEDURE — 36000058 ZZH SURGERY LEVEL 3 EA 15 ADDTL MIN: Performed by: OBSTETRICS & GYNECOLOGY

## 2018-01-28 PROCEDURE — 84702 CHORIONIC GONADOTROPIN TEST: CPT | Performed by: EMERGENCY MEDICINE

## 2018-01-28 PROCEDURE — 71000012 ZZH RECOVERY PHASE 1 LEVEL 1 FIRST HR: Performed by: OBSTETRICS & GYNECOLOGY

## 2018-01-28 PROCEDURE — 25000125 ZZHC RX 250: Performed by: OBSTETRICS & GYNECOLOGY

## 2018-01-28 PROCEDURE — 36000056 ZZH SURGERY LEVEL 3 1ST 30 MIN: Performed by: OBSTETRICS & GYNECOLOGY

## 2018-01-28 PROCEDURE — 88305 TISSUE EXAM BY PATHOLOGIST: CPT | Performed by: OBSTETRICS & GYNECOLOGY

## 2018-01-28 PROCEDURE — 25000125 ZZHC RX 250: Performed by: NURSE ANESTHETIST, CERTIFIED REGISTERED

## 2018-01-28 PROCEDURE — 25000128 H RX IP 250 OP 636

## 2018-01-28 PROCEDURE — 80048 BASIC METABOLIC PNL TOTAL CA: CPT | Performed by: EMERGENCY MEDICINE

## 2018-01-28 PROCEDURE — 85025 COMPLETE CBC W/AUTO DIFF WBC: CPT | Performed by: EMERGENCY MEDICINE

## 2018-01-28 PROCEDURE — 37000008 ZZH ANESTHESIA TECHNICAL FEE, 1ST 30 MIN: Performed by: OBSTETRICS & GYNECOLOGY

## 2018-01-28 PROCEDURE — 37000009 ZZH ANESTHESIA TECHNICAL FEE, EACH ADDTL 15 MIN: Performed by: OBSTETRICS & GYNECOLOGY

## 2018-01-28 PROCEDURE — 25000128 H RX IP 250 OP 636: Performed by: OBSTETRICS & GYNECOLOGY

## 2018-01-28 PROCEDURE — 76801 OB US < 14 WKS SINGLE FETUS: CPT

## 2018-01-28 PROCEDURE — 85610 PROTHROMBIN TIME: CPT | Performed by: EMERGENCY MEDICINE

## 2018-01-28 RX ORDER — ONDANSETRON 2 MG/ML
4 INJECTION INTRAMUSCULAR; INTRAVENOUS EVERY 6 HOURS PRN
Status: DISCONTINUED | OUTPATIENT
Start: 2018-01-28 | End: 2018-01-30 | Stop reason: HOSPADM

## 2018-01-28 RX ORDER — GLYCOPYRROLATE 0.2 MG/ML
INJECTION, SOLUTION INTRAMUSCULAR; INTRAVENOUS PRN
Status: DISCONTINUED | OUTPATIENT
Start: 2018-01-28 | End: 2018-01-28

## 2018-01-28 RX ORDER — ONDANSETRON 2 MG/ML
INJECTION INTRAMUSCULAR; INTRAVENOUS PRN
Status: DISCONTINUED | OUTPATIENT
Start: 2018-01-28 | End: 2018-01-28

## 2018-01-28 RX ORDER — ONDANSETRON 2 MG/ML
4 INJECTION INTRAMUSCULAR; INTRAVENOUS EVERY 30 MIN PRN
Status: DISCONTINUED | OUTPATIENT
Start: 2018-01-28 | End: 2018-01-28 | Stop reason: HOSPADM

## 2018-01-28 RX ORDER — ACETAMINOPHEN 325 MG/1
650 TABLET ORAL EVERY 4 HOURS PRN
Status: DISCONTINUED | OUTPATIENT
Start: 2018-01-28 | End: 2018-01-30 | Stop reason: HOSPADM

## 2018-01-28 RX ORDER — ONDANSETRON 4 MG/1
4 TABLET, ORALLY DISINTEGRATING ORAL EVERY 30 MIN PRN
Status: DISCONTINUED | OUTPATIENT
Start: 2018-01-28 | End: 2018-01-28 | Stop reason: HOSPADM

## 2018-01-28 RX ORDER — MORPHINE SULFATE 4 MG/ML
4 INJECTION, SOLUTION INTRAMUSCULAR; INTRAVENOUS ONCE
Status: COMPLETED | OUTPATIENT
Start: 2018-01-28 | End: 2018-01-28

## 2018-01-28 RX ORDER — SODIUM CHLORIDE, SODIUM LACTATE, POTASSIUM CHLORIDE, CALCIUM CHLORIDE 600; 310; 30; 20 MG/100ML; MG/100ML; MG/100ML; MG/100ML
INJECTION, SOLUTION INTRAVENOUS CONTINUOUS
Status: DISCONTINUED | OUTPATIENT
Start: 2018-01-28 | End: 2018-01-28 | Stop reason: HOSPADM

## 2018-01-28 RX ORDER — EPHEDRINE SULFATE 50 MG/ML
INJECTION, SOLUTION INTRAMUSCULAR; INTRAVENOUS; SUBCUTANEOUS PRN
Status: DISCONTINUED | OUTPATIENT
Start: 2018-01-28 | End: 2018-01-28

## 2018-01-28 RX ORDER — HYDROMORPHONE HYDROCHLORIDE 1 MG/ML
INJECTION, SOLUTION INTRAMUSCULAR; INTRAVENOUS; SUBCUTANEOUS
Status: COMPLETED
Start: 2018-01-28 | End: 2018-01-28

## 2018-01-28 RX ORDER — ACETAMINOPHEN 650 MG/1
650 SUPPOSITORY RECTAL EVERY 4 HOURS PRN
Status: DISCONTINUED | OUTPATIENT
Start: 2018-01-28 | End: 2018-01-30 | Stop reason: HOSPADM

## 2018-01-28 RX ORDER — NALOXONE HYDROCHLORIDE 0.4 MG/ML
.1-.4 INJECTION, SOLUTION INTRAMUSCULAR; INTRAVENOUS; SUBCUTANEOUS
Status: DISCONTINUED | OUTPATIENT
Start: 2018-01-28 | End: 2018-01-29

## 2018-01-28 RX ORDER — HYDROMORPHONE HYDROCHLORIDE 1 MG/ML
.3-.5 INJECTION, SOLUTION INTRAMUSCULAR; INTRAVENOUS; SUBCUTANEOUS EVERY 5 MIN PRN
Status: DISCONTINUED | OUTPATIENT
Start: 2018-01-28 | End: 2018-01-28 | Stop reason: HOSPADM

## 2018-01-28 RX ORDER — FENTANYL CITRATE 50 UG/ML
50-100 INJECTION, SOLUTION INTRAMUSCULAR; INTRAVENOUS
Status: DISCONTINUED | OUTPATIENT
Start: 2018-01-28 | End: 2018-01-28 | Stop reason: HOSPADM

## 2018-01-28 RX ORDER — MORPHINE SULFATE 4 MG/ML
4 INJECTION, SOLUTION INTRAMUSCULAR; INTRAVENOUS ONCE
Status: DISCONTINUED | OUTPATIENT
Start: 2018-01-28 | End: 2018-01-30 | Stop reason: HOSPADM

## 2018-01-28 RX ORDER — CALCIUM CARBONATE 500 MG/1
500-1000 TABLET, CHEWABLE ORAL
Status: DISCONTINUED | OUTPATIENT
Start: 2018-01-28 | End: 2018-01-30 | Stop reason: HOSPADM

## 2018-01-28 RX ORDER — IBUPROFEN 600 MG/1
600 TABLET, FILM COATED ORAL EVERY 6 HOURS PRN
Status: DISCONTINUED | OUTPATIENT
Start: 2018-01-28 | End: 2018-01-30 | Stop reason: HOSPADM

## 2018-01-28 RX ORDER — NEOSTIGMINE METHYLSULFATE 1 MG/ML
VIAL (ML) INJECTION PRN
Status: DISCONTINUED | OUTPATIENT
Start: 2018-01-28 | End: 2018-01-28

## 2018-01-28 RX ORDER — PROCHLORPERAZINE MALEATE 10 MG
10 TABLET ORAL EVERY 6 HOURS PRN
Status: DISCONTINUED | OUTPATIENT
Start: 2018-01-28 | End: 2018-01-30 | Stop reason: HOSPADM

## 2018-01-28 RX ORDER — MEPERIDINE HYDROCHLORIDE 25 MG/ML
12.5 INJECTION INTRAMUSCULAR; INTRAVENOUS; SUBCUTANEOUS EVERY 5 MIN PRN
Status: DISCONTINUED | OUTPATIENT
Start: 2018-01-28 | End: 2018-01-28 | Stop reason: HOSPADM

## 2018-01-28 RX ORDER — LIDOCAINE 40 MG/G
CREAM TOPICAL
Status: DISCONTINUED | OUTPATIENT
Start: 2018-01-28 | End: 2018-01-30 | Stop reason: HOSPADM

## 2018-01-28 RX ORDER — FENTANYL CITRATE 50 UG/ML
25-50 INJECTION, SOLUTION INTRAMUSCULAR; INTRAVENOUS
Status: DISCONTINUED | OUTPATIENT
Start: 2018-01-28 | End: 2018-01-28 | Stop reason: HOSPADM

## 2018-01-28 RX ORDER — DEXAMETHASONE SODIUM PHOSPHATE 4 MG/ML
INJECTION, SOLUTION INTRA-ARTICULAR; INTRALESIONAL; INTRAMUSCULAR; INTRAVENOUS; SOFT TISSUE PRN
Status: DISCONTINUED | OUTPATIENT
Start: 2018-01-28 | End: 2018-01-28

## 2018-01-28 RX ORDER — LIDOCAINE HYDROCHLORIDE 20 MG/ML
INJECTION, SOLUTION INFILTRATION; PERINEURAL PRN
Status: DISCONTINUED | OUTPATIENT
Start: 2018-01-28 | End: 2018-01-28

## 2018-01-28 RX ORDER — DIMENHYDRINATE 50 MG/ML
12.5 INJECTION, SOLUTION INTRAMUSCULAR; INTRAVENOUS
Status: DISCONTINUED | OUTPATIENT
Start: 2018-01-28 | End: 2018-01-28 | Stop reason: HOSPADM

## 2018-01-28 RX ORDER — FENTANYL CITRATE 50 UG/ML
INJECTION, SOLUTION INTRAMUSCULAR; INTRAVENOUS PRN
Status: DISCONTINUED | OUTPATIENT
Start: 2018-01-28 | End: 2018-01-28

## 2018-01-28 RX ORDER — SODIUM CHLORIDE, SODIUM LACTATE, POTASSIUM CHLORIDE, CALCIUM CHLORIDE 600; 310; 30; 20 MG/100ML; MG/100ML; MG/100ML; MG/100ML
INJECTION, SOLUTION INTRAVENOUS CONTINUOUS
Status: DISCONTINUED | OUTPATIENT
Start: 2018-01-28 | End: 2018-01-30 | Stop reason: HOSPADM

## 2018-01-28 RX ORDER — HYDROMORPHONE HYDROCHLORIDE 1 MG/ML
.3-.5 INJECTION, SOLUTION INTRAMUSCULAR; INTRAVENOUS; SUBCUTANEOUS
Status: DISCONTINUED | OUTPATIENT
Start: 2018-01-28 | End: 2018-01-30 | Stop reason: HOSPADM

## 2018-01-28 RX ORDER — OXYCODONE HYDROCHLORIDE 5 MG/1
5 TABLET ORAL EVERY 4 HOURS PRN
Status: DISCONTINUED | OUTPATIENT
Start: 2018-01-28 | End: 2018-01-29

## 2018-01-28 RX ORDER — NALOXONE HYDROCHLORIDE 0.4 MG/ML
.1-.4 INJECTION, SOLUTION INTRAMUSCULAR; INTRAVENOUS; SUBCUTANEOUS
Status: DISCONTINUED | OUTPATIENT
Start: 2018-01-28 | End: 2018-01-30 | Stop reason: HOSPADM

## 2018-01-28 RX ORDER — KETOROLAC TROMETHAMINE 15 MG/ML
15 INJECTION, SOLUTION INTRAMUSCULAR; INTRAVENOUS EVERY 6 HOURS PRN
Status: DISCONTINUED | OUTPATIENT
Start: 2018-01-28 | End: 2018-01-30 | Stop reason: HOSPADM

## 2018-01-28 RX ORDER — BUPIVACAINE HYDROCHLORIDE 5 MG/ML
INJECTION, SOLUTION PERINEURAL PRN
Status: DISCONTINUED | OUTPATIENT
Start: 2018-01-28 | End: 2018-01-28 | Stop reason: HOSPADM

## 2018-01-28 RX ORDER — PROPOFOL 10 MG/ML
INJECTION, EMULSION INTRAVENOUS CONTINUOUS PRN
Status: DISCONTINUED | OUTPATIENT
Start: 2018-01-28 | End: 2018-01-28

## 2018-01-28 RX ORDER — ONDANSETRON 4 MG/1
4 TABLET, ORALLY DISINTEGRATING ORAL EVERY 6 HOURS PRN
Status: DISCONTINUED | OUTPATIENT
Start: 2018-01-28 | End: 2018-01-30 | Stop reason: HOSPADM

## 2018-01-28 RX ORDER — KETOROLAC TROMETHAMINE 30 MG/ML
30 INJECTION, SOLUTION INTRAMUSCULAR; INTRAVENOUS ONCE
Status: DISCONTINUED | OUTPATIENT
Start: 2018-01-28 | End: 2018-01-28 | Stop reason: HOSPADM

## 2018-01-28 RX ORDER — MAGNESIUM HYDROXIDE 1200 MG/15ML
LIQUID ORAL PRN
Status: DISCONTINUED | OUTPATIENT
Start: 2018-01-28 | End: 2018-01-28 | Stop reason: HOSPADM

## 2018-01-28 RX ORDER — PROPOFOL 10 MG/ML
INJECTION, EMULSION INTRAVENOUS PRN
Status: DISCONTINUED | OUTPATIENT
Start: 2018-01-28 | End: 2018-01-28

## 2018-01-28 RX ORDER — FENTANYL CITRATE 50 UG/ML
50 INJECTION, SOLUTION INTRAMUSCULAR; INTRAVENOUS ONCE
Status: COMPLETED | OUTPATIENT
Start: 2018-01-28 | End: 2018-01-28

## 2018-01-28 RX ADMIN — NEOSTIGMINE METHYLSULFATE 3 MG: 1 INJECTION INTRAMUSCULAR; INTRAVENOUS; SUBCUTANEOUS at 14:35

## 2018-01-28 RX ADMIN — ROCURONIUM BROMIDE 10 MG: 10 INJECTION INTRAVENOUS at 13:41

## 2018-01-28 RX ADMIN — SODIUM CHLORIDE, POTASSIUM CHLORIDE, SODIUM LACTATE AND CALCIUM CHLORIDE: 600; 310; 30; 20 INJECTION, SOLUTION INTRAVENOUS at 12:32

## 2018-01-28 RX ADMIN — ROCURONIUM BROMIDE 10 MG: 10 INJECTION INTRAVENOUS at 13:48

## 2018-01-28 RX ADMIN — PHENYLEPHRINE HYDROCHLORIDE 100 MCG: 10 INJECTION INTRAVENOUS at 13:15

## 2018-01-28 RX ADMIN — SODIUM CHLORIDE 1000 ML: 900 IRRIGANT IRRIGATION at 13:23

## 2018-01-28 RX ADMIN — PHENYLEPHRINE HYDROCHLORIDE 100 MCG: 10 INJECTION INTRAVENOUS at 13:53

## 2018-01-28 RX ADMIN — SODIUM CHLORIDE 1000 ML: 900 IRRIGANT IRRIGATION at 13:29

## 2018-01-28 RX ADMIN — CALCIUM CARBONATE (ANTACID) CHEW TAB 500 MG 1000 MG: 500 CHEW TAB at 22:13

## 2018-01-28 RX ADMIN — PHENYLEPHRINE HYDROCHLORIDE 100 MCG: 10 INJECTION INTRAVENOUS at 13:19

## 2018-01-28 RX ADMIN — LIDOCAINE HYDROCHLORIDE 100 MG: 20 INJECTION, SOLUTION INFILTRATION; PERINEURAL at 12:53

## 2018-01-28 RX ADMIN — FENTANYL CITRATE 50 MCG: 50 INJECTION INTRAMUSCULAR; INTRAVENOUS at 11:30

## 2018-01-28 RX ADMIN — KETOROLAC TROMETHAMINE 15 MG: 15 INJECTION, SOLUTION INTRAMUSCULAR; INTRAVENOUS at 15:39

## 2018-01-28 RX ADMIN — ROCURONIUM BROMIDE 30 MG: 10 INJECTION INTRAVENOUS at 13:09

## 2018-01-28 RX ADMIN — HYDROMORPHONE HYDROCHLORIDE 0.5 MG: 1 INJECTION, SOLUTION INTRAMUSCULAR; INTRAVENOUS; SUBCUTANEOUS at 15:07

## 2018-01-28 RX ADMIN — BUPIVACAINE HYDROCHLORIDE 10 ML: 5 INJECTION, SOLUTION PERINEURAL at 14:43

## 2018-01-28 RX ADMIN — PROPOFOL 200 MCG/KG/MIN: 10 INJECTION, EMULSION INTRAVENOUS at 12:52

## 2018-01-28 RX ADMIN — SODIUM CHLORIDE, POTASSIUM CHLORIDE, SODIUM LACTATE AND CALCIUM CHLORIDE: 600; 310; 30; 20 INJECTION, SOLUTION INTRAVENOUS at 15:40

## 2018-01-28 RX ADMIN — PHENYLEPHRINE HYDROCHLORIDE 100 MCG: 10 INJECTION INTRAVENOUS at 13:34

## 2018-01-28 RX ADMIN — SODIUM CHLORIDE 1000 ML: 9 INJECTION, SOLUTION INTRAVENOUS at 09:55

## 2018-01-28 RX ADMIN — PROPOFOL 200 MG: 10 INJECTION, EMULSION INTRAVENOUS at 12:53

## 2018-01-28 RX ADMIN — PHENYLEPHRINE HYDROCHLORIDE 100 MCG: 10 INJECTION INTRAVENOUS at 13:26

## 2018-01-28 RX ADMIN — Medication 10 MG: at 13:13

## 2018-01-28 RX ADMIN — ROCURONIUM BROMIDE 10 MG: 10 INJECTION INTRAVENOUS at 14:01

## 2018-01-28 RX ADMIN — MIDAZOLAM 2 MG: 1 INJECTION INTRAMUSCULAR; INTRAVENOUS at 12:42

## 2018-01-28 RX ADMIN — DEXMEDETOMIDINE HYDROCHLORIDE 8 MCG: 100 INJECTION, SOLUTION INTRAVENOUS at 14:46

## 2018-01-28 RX ADMIN — HYDROMORPHONE HYDROCHLORIDE 0.5 MG: 1 INJECTION, SOLUTION INTRAMUSCULAR; INTRAVENOUS; SUBCUTANEOUS at 14:34

## 2018-01-28 RX ADMIN — PROPOFOL: 10 INJECTION, EMULSION INTRAVENOUS at 13:28

## 2018-01-28 RX ADMIN — SUCCINYLCHOLINE CHLORIDE 160 MG: 20 INJECTION, SOLUTION INTRAMUSCULAR; INTRAVENOUS at 12:54

## 2018-01-28 RX ADMIN — ONDANSETRON 4 MG: 2 INJECTION INTRAMUSCULAR; INTRAVENOUS at 13:09

## 2018-01-28 RX ADMIN — FENTANYL CITRATE 100 MCG: 50 INJECTION, SOLUTION INTRAMUSCULAR; INTRAVENOUS at 12:42

## 2018-01-28 RX ADMIN — Medication 0.5 MG: at 18:21

## 2018-01-28 RX ADMIN — GLYCOPYRROLATE 0.5 MG: 0.2 INJECTION, SOLUTION INTRAMUSCULAR; INTRAVENOUS at 14:35

## 2018-01-28 RX ADMIN — DEXAMETHASONE SODIUM PHOSPHATE 4 MG: 4 INJECTION, SOLUTION INTRA-ARTICULAR; INTRALESIONAL; INTRAMUSCULAR; INTRAVENOUS; SOFT TISSUE at 13:09

## 2018-01-28 RX ADMIN — Medication 5 MG: at 13:21

## 2018-01-28 RX ADMIN — HYDROMORPHONE HYDROCHLORIDE 0.5 MG: 1 INJECTION, SOLUTION INTRAMUSCULAR; INTRAVENOUS; SUBCUTANEOUS at 17:23

## 2018-01-28 RX ADMIN — HYDROMORPHONE HYDROCHLORIDE 0.5 MG: 1 INJECTION, SOLUTION INTRAMUSCULAR; INTRAVENOUS; SUBCUTANEOUS at 15:42

## 2018-01-28 RX ADMIN — SODIUM CHLORIDE, POTASSIUM CHLORIDE, SODIUM LACTATE AND CALCIUM CHLORIDE: 600; 310; 30; 20 INJECTION, SOLUTION INTRAVENOUS at 13:37

## 2018-01-28 RX ADMIN — PROPOFOL 50 MG: 10 INJECTION, EMULSION INTRAVENOUS at 12:54

## 2018-01-28 RX ADMIN — ROCURONIUM BROMIDE 10 MG: 10 INJECTION INTRAVENOUS at 13:26

## 2018-01-28 RX ADMIN — ONDANSETRON 4 MG: 2 INJECTION INTRAMUSCULAR; INTRAVENOUS at 14:34

## 2018-01-28 RX ADMIN — OXYCODONE HYDROCHLORIDE 5 MG: 5 TABLET ORAL at 23:25

## 2018-01-28 RX ADMIN — MORPHINE SULFATE 4 MG: 4 INJECTION INTRAVENOUS at 09:55

## 2018-01-28 RX ADMIN — PHENYLEPHRINE HYDROCHLORIDE 100 MCG: 10 INJECTION INTRAVENOUS at 13:13

## 2018-01-28 RX ADMIN — PHENYLEPHRINE HYDROCHLORIDE 100 MCG: 10 INJECTION INTRAVENOUS at 13:10

## 2018-01-28 RX ADMIN — PHENYLEPHRINE HYDROCHLORIDE 100 MCG: 10 INJECTION INTRAVENOUS at 13:21

## 2018-01-28 ASSESSMENT — LIFESTYLE VARIABLES: TOBACCO_USE: 0

## 2018-01-28 ASSESSMENT — ENCOUNTER SYMPTOMS
NAUSEA: 0
ORTHOPNEA: 0
VOMITING: 0
SEIZURES: 0
ABDOMINAL PAIN: 1
DIARRHEA: 0
DYSRHYTHMIAS: 0

## 2018-01-28 ASSESSMENT — COPD QUESTIONNAIRES: COPD: 0

## 2018-01-28 NOTE — BRIEF OP NOTE
[facility] Brief Operative Note    Pre-operative diagnosis: 38yo  with ectopic pregnancy  Previous ectopic   Morbid obesity   Post-operative diagnosis Same  Left tubal ectopic with active bleeding   Procedure: Procedure(s):  LAPAROSCOPIC SINGLE SITE EVACUATION OF ECTOPIC PREGNANCY, LEFT SALPINGECTOMY - Wound Class: II-Clean Contaminated   - Wound Class: II-Clean Contaminated   Surgeon(s): Surgeon(s) and Role:     * Erum Boles MD - Primary   Estimated blood loss: 50 mL    Specimens:   ID Type Source Tests Collected by Time Destination   A : LEFT FALLOPIAN TUBE AND PRODUCTS OF CONCEPTION Tissue Fallopian Tube, Left SURGICAL PATHOLOGY EXAM Erum Boles MD 2018  1:48 PM       Findings: 400cc of blood in pelvis, actively bleeding ectopic pregnancy in left tube. Normal uterus, ovaries and right tube.     Erum Boles MD

## 2018-01-28 NOTE — IP AVS SNAPSHOT
MRN:[id number]                      After Visit Summary   1/28/2018    Paulina Diana    MRN: [id number]           Thank you!     Thank you for choosing [facility] for your care. Our goal is always to provide you with excellent care. Hearing back from our patients is one way we can continue to improve our services. Please take a few minutes to complete the written survey that you may receive in the mail after you visit with us. Thank you!        Patient Information     Date Of Birth          1980        Designated Caregiver       Most Recent Value    Caregiver    Will someone help with your care after discharge? yes    Name of designated caregiver Erick, spouse    Phone number of caregiver [phone]      About your hospital stay     You were admitted on:  January 28, 2018 You last received care in the:  [facility]    You were discharged on:  January 30, 2018       Who to Call     For medical emergencies, please call 911.  For non-urgent questions about your medical care, please call your primary care provider or clinic, [phone]  For questions related to your surgery, please call your surgery clinic        Attending Provider     Provider Specialty    Elmira Silva MD Emergency Medicine    [facility], Erum Booth MD OB/Gyn       Primary Care Provider Office Phone # Fax #    Ismael Ribera [phone]      After Care Instructions     Discharge Instructions       Pelvic Rest. No tampons, douching or intercourse for  2  weeks.            Discharge Instructions       Patient to arrange follow up appointment in 2  weeks            No lifting        No lifting over 20 lbs and no strenuous physical activity for 3 weeks                  Pending Results     Date and Time Order Name Status Description    1/28/2018 1349 Surgical pathology exam In process             Statement of Approval     Ordered          01/30/18 0844  I have reviewed and agree with  "all the recommendations and orders detailed in this document.  EFFECTIVE NOW     Approved and electronically signed by:  Peggy Thornton MD             Admission Information     Date & Time Provider Department Dept. Phone    1/28/2018 Erum Boles MD [facility] [phone]      Your Vitals Were     Blood Pressure Pulse Temperature Respirations Height Weight    116/65 (BP Location: Right arm) 76 96.2  F (35.7  C) (Oral) 16 1.651 m (5' 5\") 108.9 kg (240 lb)    Last Period Pulse Oximetry BMI (Body Mass Index)             11/22/2017 94% 39.94 kg/m2         MyChart Information     [employer] gives you secure access to your electronic health record. If you see a primary care provider, you can also send messages to your care team and make appointments. If you have questions, please call your primary care clinic.  If you do not have a primary care provider, please call [phone] and they will assist you.        Care EveryWhere ID     This is your Care EveryWhere ID. This could be used by other organizations to access your [facility] medical records  [id number]        Equal Access to Services     BRANT CHUN AH: Hadchristen Moreau, trudy kenney, karma willett, mo walker . So [facility] [phone].    ATENCIÓN: Si habla español, tiene a thompson disposición servicios gratuitos de asistencia lingüística. [facility] [phone].    We comply with applicable federal civil rights laws and [place] laws. We do not discriminate on the basis of race, color, national origin, age, disability, sex, sexual orientation, or gender identity.               Review of your medicines      START taking        Dose / Directions    ferrous sulfate 325 (65 FE) MG tablet   Commonly known as:  IRON   Used for:  Ectopic pregnancy without intrauterine pregnancy, unspecified location        Dose:  325 mg   Take 1 tablet (325 mg) by mouth daily (with breakfast)   Quantity:  " 30 tablet   Refills:  2       oxyCODONE IR 10 MG tablet   Commonly known as:  ROXICODONE   Used for:  Ectopic pregnancy without intrauterine pregnancy, unspecified location        Dose:  10 mg   Take 1 tablet (10 mg) by mouth every 4 hours as needed for moderate to severe pain   Quantity:  10 tablet   Refills:  0         CONTINUE these medicines which have NOT CHANGED        Dose / Directions    levothyroxine 125 MCG tablet   Commonly known as:  SYNTHROID/LEVOTHROID   Used for:  Acquired hypothyroidism        Dose:  125 mcg   Take 1 tablet (125 mcg) by mouth daily   Quantity:  30 tablet   Refills:  3            Where to get your medicines      These medications were sent to [facility] - [address]  [address], [place] [address]     Phone:  [phone]     ferrous sulfate 325 (65 FE) MG tablet         Some of these will need a paper prescription and others can be bought over the counter. Ask your nurse if you have questions.     Bring a paper prescription for each of these medications     oxyCODONE IR 10 MG tablet                Protect others around you: Learn how to safely use, store and throw away your medicines at www.disposemymeds.org.        Information about OPIOIDS     PRESCRIPTION OPIOIDS: WHAT YOU NEED TO KNOW    Prescription opioids can be used to help relieve moderate to severe pain and are often prescribed following a surgery or injury, or for certain health conditions. These medications can be an important part of treatment but also come with serious risks. It is important to work with your health care provider to make sure you are getting the safest, most effective care.    WHAT ARE THE RISKS AND SIDE EFFECTS OF OPIOID USE?  Prescription opioids carry serious risks of addiction and overdose, especially with prolonged use. An opioid overdose, often marked by slowed breathing can cause sudden death. The use of prescription opioids can have a number of  side effects as well, even when taken as directed:      Tolerance - meaning you might need to take more of a medication for the same pain relief    Physical dependence - meaning you have symptoms of withdrawal when a medication is stopped    Increased sensitivity to pain    Constipation    Nausea, vomiting, and dry mouth    Sleepiness and dizziness    Confusion    Depression    Low levels of testosterone that can result in lower sex drive, energy, and strength    Itching and sweating    RISKS ARE GREATER WITH:    History of drug misuse, substance use disorder, or overdose    Mental health conditions (such as depression or anxiety)    Sleep apnea    Older age (65 years or older)    Pregnancy    Avoid alcohol while taking prescription opioids.   Also, unless specifically advised by your health care provider, medications to avoid include:    Benzodiazepines (such as Xanax or Valium)    Muscle relaxants (such as Soma or Flexeril)    Hypnotics (such as Ambien or Lunesta)    Other prescription opioids    KNOW YOUR OPTIONS:  Talk to your health care provider about ways to manage your pain that do not involve prescription opioids. Some of these options may actually work better and have fewer risks and side effects:    Pain relievers such as acetaminophen, ibuprofen, and naproxen    Some medications that are also used for depression or seizures    Physical therapy and exercise    Cognitive behavioral therapy, a psychological, goal-directed approach, in which patients learn how to modify physical, behavioral, and emotional triggers of pain and stress    IF YOU ARE PRESCRIBED OPIOIDS FOR PAIN:    Never take opioids in greater amounts or more often than prescribed    Follow up with your primary health care provider and work together to create a plan on how to manage your pain.    Talk about ways to help manage your pain that do not involve prescription opioids    Talk about all concerns and side effects    Help prevent misuse  and abuse    Never sell or share prescription opioids    Never use another person's prescription opioids    Store prescription opioids in a secure place and out of reach of others (this may include visitors, children, friends, and family)    Visit www.cdc.gov/drugoverdose to learn about risks of opioid abuse and overdose    If you believe you may be struggling with addiction, tell your health care provider and ask for guidance or call [facility]'s National Helpline at 6-034-866-HELP    LEARN MORE / www.cdc.gov/drugoverdose/prescribing/guideline.html    Safely dispose of unused prescription opioids: Find your local drug take-back programs and more information about the importance of safe disposal at www.doseofreality.[place].gov             Medication List: This is a list of all your medications and when to take them. Check marks below indicate your daily home schedule. Keep this list as a reference.      Medications           Morning Afternoon Evening Bedtime As Needed    ferrous sulfate 325 (65 FE) MG tablet   Commonly known as:  IRON   Take 1 tablet (325 mg) by mouth daily (with breakfast)                                   levothyroxine 125 MCG tablet   Commonly known as:  SYNTHROID/LEVOTHROID   Take 1 tablet (125 mcg) by mouth daily                                   oxyCODONE IR 10 MG tablet   Commonly known as:  ROXICODONE   Take 1 tablet (10 mg) by mouth every 4 hours as needed for moderate to severe pain   Last time this was given:  10 mg on 1/30/2018  8:24 AM

## 2018-01-28 NOTE — PHARMACY-ADMISSION MEDICATION HISTORY
Admission medication history interview status for the 1/28/2018  admission is complete. See EPIC admission navigator for prior to admission medications     Medication history source reliability:Good    Actions taken by pharmacist (provider contacted, etc):None     Additional medication history information not noted on PTA med list :None    Medication reconciliation/reorder completed by provider prior to medication history? No    Time spent in this activity: 5 min    Prior to Admission medications    Medication Sig Last Dose Taking? Auth Provider   levothyroxine (SYNTHROID/LEVOTHROID) 125 MCG tablet Take 1 tablet (125 mcg) by mouth daily 1/27/2018 at am Yes Ismael Ribera MD

## 2018-01-28 NOTE — ANESTHESIA POSTPROCEDURE EVALUATION
Patient: Paulina AGUIAR Adalgisa    Procedure(s):  LAPAROSCOPIC SINGLE SITE EVACUATION OF ECTOPIC PREGNANCY, LEFT SALPINGECTOMY - Wound Class: II-Clean Contaminated   - Wound Class: II-Clean Contaminated    Diagnosis:unknown   Diagnosis Additional Information: No value filed.    Anesthesia Type:  General, RSI, ETT    Note:  Anesthesia Post Evaluation    Patient location during evaluation: PACU  Patient participation: Able to fully participate in evaluation  Level of consciousness: responsive to verbal stimuli and sleepy but conscious  Pain management: adequate  Airway patency: patent  Cardiovascular status: acceptable  Respiratory status: acceptable  Hydration status: acceptable  PONV: none     Anesthetic complications: None          Last vitals:  Vitals:    01/28/18 1550 01/28/18 1600 01/28/18 1610   BP: 111/72 120/71 110/64   Pulse:      Resp: 17 15 12   Temp:  35.9  C (96.6  F)    SpO2: 97% 100% 100%         Electronically Signed By: Trevon Perdomo MD  January 28, 2018  4:23 PM

## 2018-01-28 NOTE — ANESTHESIA PREPROCEDURE EVALUATION
Procedure: Procedure(s):  EVACUATE ECTOPIC PREGNANCY  Preop diagnosis: Ectopic Pregnancy    Allergies   Allergen Reactions     Ceclor Cd [Cefaclor Monohydrate] Hives     Past Medical History:   Diagnosis Date     Thyroid disease     hypothyroid     Past Surgical History:   Procedure Laterality Date     DILATION AND CURETTAGE SUCTION N/A 5/27/2016    Procedure: DILATION AND CURETTAGE SUCTION;  Surgeon: Natacha Goyal MD;  Location: [facility]     ENT SURGERY      septal repair     LAPAROSCOPIC SALPINGO-OOPHORECTOMY  11/2/2013    Procedure: LAPAROSCOPIC SALPINGO-OOPHORECTOMY;  Single port laparoscopic, Evacuation of hemo-peritonium and Products of Conception;  Surgeon: Peggy Thornton MD;  Location:  OR     NOSE SURGERY      age 12     Social History   Substance Use Topics     Smoking status: Never Smoker     Smokeless tobacco: Never Used      Comment:  on occasion     Alcohol use No      Comment: rare     Prior to Admission medications    Medication Sig Start Date End Date Taking? Authorizing Provider   levothyroxine (SYNTHROID/LEVOTHROID) 125 MCG tablet Take 1 tablet (125 mcg) by mouth daily 1/8/18  Yes Ismael Ribera MD     Current Facility-Administered Medications Ordered in Epic   Medication Dose Route Frequency Last Rate Last Dose     [Auto Hold] morphine (PF) injection 4 mg  4 mg Intravenous Once         lactated ringers infusion   Intravenous Continuous         lidocaine 1 % 1 mL  1 mL Other Q1H PRN         sodium chloride (PF) 0.9% PF flush 3 mL  3 mL Intracatheter Q1H PRN         PRE OP antibiotics NOT needed for this surgical procedure  1 each As instructed Continuous         ketorolac (TORADOL) injection 30 mg  30 mg Intravenous Once         No current [facility]-ordered outpatient prescriptions on file.       lactated ringers       no pre procedure antibiotic needed       Wt Readings from Last 1 Encounters:   01/28/18 108.9 kg (240 lb)     Temp Readings from Last 1 Encounters:   01/28/18 35.8  C  (96.4  F) (Temporal)     BP Readings from Last 6 Encounters:   01/28/18 108/73   01/05/18 134/89   08/08/17 120/80   09/27/16 120/84   07/29/16 126/78   05/27/16 123/87     Pulse Readings from Last 4 Encounters:   01/28/18 76   01/05/18 90   08/08/17 68   09/27/16 69     Resp Readings from Last 1 Encounters:   01/28/18 16     SpO2 Readings from Last 1 Encounters:   01/28/18 100%     Recent Labs   Lab Test  01/28/18   0912  01/05/18   1804   NA  140  141   POTASSIUM  3.6  4.1   CHLORIDE  107  107   CO2  26  28   ANIONGAP  7  6   GLC  101*  90   BUN  15  12   CR  0.86  0.75   CHRISTIAN  8.2*  8.2*     Recent Labs   Lab Test  01/05/18   1804  09/27/16   1648   11/01/13   2130   AST  17  13   < >  19   ALT  15  18   < >  36   ALKPHOS  71  51   < >  52   BILITOTAL  0.3  0.3   < >  0.4   LIPASE   --    --    --   100    < > = values in this interval not displayed.     Recent Labs   Lab Test  01/28/18   0912  01/05/18   1804   WBC  8.5  10.6   HGB  11.6*  11.6*   PLT  330  393     Recent Labs   Lab Test  11/01/13   2130   ABO  A   RH   Pos     Recent Labs   Lab Test  01/28/18   0912   INR  0.93      Recent Results (from the past 744 hour(s))   US OB less than 14 Weeks W Transvaginal   Result Value    Radiologist flags Possible ectopic pregnancy (AA)    Narrative    ULTRASOUND OBSTETRIC LESS THAN FOURTEEN WEEKS WITH TRANSVAGINAL  IMAGING SINGLE  1/28/2018 9:38 AM     HISTORY:  Pregnancy with pain or bleeding.    TECHNIQUE:  Transvaginal technique performed to better evaluate the  ovaries and uterus.    FINDINGS:  No intrauterine pregnancy is identified. Endometrial  thickness 13.6 mm. Right ovary unremarkable. Left ovary not seen.  There is a complex area in the right adnexal region, measuring 7.9 x  4.6 x 7.3 cm. Since no IUP is seen, this structure could represent an  ectopic pregnancy or a combination of ectopic pregnancy and  hemorrhage.     [Critical Result: Possible ectopic pregnancy]    Finding was identified on 1/28/2018  9:38 AM.     Dr. Silva was contacted by me on 1/28/2018 9:42 AM and verbalized  understanding of the critical result.      Impression    IMPRESSION: A right-sided ectopic pregnancy cannot be excluded.    ANI SHETH MD       RECENT LABS:   ECG:   ECHO:     Anesthesia Evaluation     . Pt has had prior anesthetic. Type: General    No history of anesthetic complications          ROS/MED HX    ENT/Pulmonary:     (+)MELBA risk factors obese, , . .   (-) tobacco use, asthma, COPD and sleep apnea   Neurologic:      (-) seizures and CVA   Cardiovascular:        (-) angina, hypertension, CAD, orthopnea/PND, syncope, arrhythmias, irregular heartbeat/palpitations, valvular problems/murmurs and angina   METS/Exercise Tolerance:  >4 METS   Hematologic:        (-) History of Transfusion   Musculoskeletal:         GI/Hepatic:        (-) GERD and liver disease   Renal/Genitourinary:      (-) renal disease   Endo:     (+) thyroid problem hypothyroidism, Obesity, .   (-) Type II DM   Psychiatric:         Infectious Disease:         Malignancy:         Other:                     Physical Exam  Normal systems: dental    Airway   Mallampati: II  TM distance: >3 FB  Neck ROM: full    Dental     Cardiovascular   Rhythm and rate: regular and abnormal  (-) no murmur    Pulmonary    breath sounds clear to auscultation(-) no wheezes                    Anesthesia Plan      History & Physical Review  History and physical reviewed and following examination; no interval change.    ASA Status:  2 emergent.    NPO Status:  > 8 hours    Plan for General, RSI and ETT with Propofol and Intravenous induction. Maintenance will be Balanced.    PONV prophylaxis:  Ondansetron (or other 5HT-3), Dexamethasone or Solumedrol and Other (See comment)  Additional equipment: Videolaryngoscope Propofol infusion/MIVA  Zofran 8 mg (total - check if received in ED just prior to presentation)  Decadron 8 mg   Benadryl 12.5 mg       Postoperative Care  Postoperative  pain management:  Multi-modal analgesia.      Consents  Anesthetic plan, risks, benefits and alternatives discussed with:  Patient..

## 2018-01-28 NOTE — ED NOTES
"[facility]  ED Nurse Handoff Report    ED Chief complaint: Abdominal Pain (\"I think I have an ectopic pregnancy. I have had one before.\" Approx 5 weeks pregnant. Lower abd pain.)      ED Diagnosis:   Final diagnoses:   Ectopic pregnancy without intrauterine pregnancy, unspecified location       Code Status: Full Code    Allergies:   Allergies   Allergen Reactions     Ceclor Cd [Cefaclor Monohydrate] Hives       Activity level - Baseline/Home:  Independent    Activity Level - Current:   Independent     Needed?: No    Isolation: No  Infection: Not Applicable    Bariatric?: No    Vital Signs:   Vitals:    01/28/18 0903   BP: 125/88   Pulse: 76   Resp: 16   Temp: 97.5  F (36.4  C)   TempSrc: Oral   SpO2: 100%   Weight: 108.9 kg (240 lb)   Height: 1.651 m (5' 5\")       Cardiac Rhythm: ,        Pain level: 0-10 Pain Scale: 10    Is this patient confused?: No    Patient Report: Initial Complaint: lower abd pain  Focused Assessment: Pt has a hx of ectopic pregnancy on the right in 2013; she is currently 5-6 weeks pregnant with extreme pain; Ultrasound showed an ectopic on the right  Tests Performed: labs, ultrasound  Abnormal Results: see results  Treatments provided: pain meds and surgery    Family Comments:  at bedside     OBS brochure/video discussed/provided to patient: N/A    ED Medications:   Medications   morphine (PF) injection 4 mg (not administered)   0.9% sodium chloride BOLUS (1,000 mLs Intravenous New Bag 1/28/18 0955)   morphine (PF) injection 4 mg (4 mg Intravenous Given 1/28/18 0955)       Drips infusing?:  Yes; 0.9 NS      ED NURSE PHONE NUMBER: *[id number]         "

## 2018-01-28 NOTE — PROGRESS NOTES
RECEIVING UNIT ED HANDOFF REVIEW    ED Nurse Handoff Report was reviewed by: Margo Mariee on January 28, 2018 at 10:37 AM

## 2018-01-28 NOTE — H&P
"History and Physical     Paulina Diana MRN# [id number]   YOB: 1980 Age: 37 year old      Date of Admission:  2018    Primary care provider: Ismael Ribera          Assessment and Plan:   36yo  who presents with pelvic pain and known early pregnancy with ultrasound confirming ectopic pregnancy with adnexal mass and no intrauterine pregnancy.   --Proceed to OR for laparoscopic evacuation of ectopic pregnancy given high HCG, large size of mass.   --Consented patient for possible salpingectomy given recurrent ectopic pregnancy on same side of pelvis per ultrasound, though confirmation of location will be during surgery.   --Monitor overnight for pain/nausea after surgery.           Attestation:  This patient has been seen and evaluated by me, Erum Boles MD.              Chief Complaint:   Pelvic pain and \"likely ectopic\"    History is obtained from the patient         History of Present Illness:   This patient is a 37 year old female who presents with worsening pelvic/abdominal pain and suspected ectopic pregnancy. She has had a prior right adnexal ectopic and this feels similar. Rising quantitative HCG levels were followed in clinic, but she was given strict precautions for worsening pain, bleeding. Ultrasound confirms right adnexal mass and no IUP; given level of quantitative HCG and size of mass, surgical treatment is recommended. Vital signs were stable prior to surgery and Hgb was 11.6.              Past Medical History:     Past Medical History:   Diagnosis Date     Thyroid disease     hypothyroid             Past Surgical History:     Past Surgical History:   Procedure Laterality Date     DILATION AND CURETTAGE SUCTION N/A 2016    Procedure: DILATION AND CURETTAGE SUCTION;  Surgeon: Natacha Goyal MD;  Location: [facility]     ENT SURGERY      septal repair     LAPAROSCOPIC SALPINGO-OOPHORECTOMY  2013    Procedure: LAPAROSCOPIC SALPINGO-OOPHORECTOMY;  Single port " laparoscopic, Evacuation of hemo-peritonium and Products of Conception;  Surgeon: Peggy Thornton MD;  Location: [facility] OR     NOSE SURGERY      age 12                Social History:   I have reviewed this patient's social history          Family History:   This patient has no significant family history          Immunizations:   Immunizations are up to date         Allergies:     Allergies   Allergen Reactions     Ceclor Cd [Cefaclor Monohydrate] Hives             Medications:     Prescriptions Prior to Admission   Medication Sig Dispense Refill Last Dose     levothyroxine (SYNTHROID/LEVOTHROID) 125 MCG tablet Take 1 tablet (125 mcg) by mouth daily 30 tablet 3 1/27/2018 at am             Review of Systems:   The 10 point Review of Systems is negative other than noted in the HPI              Physical Exam:   Vitals were reviewed  Temp: 96.4  F (35.8  C) Temp src: Temporal BP: 108/73 Pulse: 76   Resp: 16 SpO2: 100 % O2 Device: None (Room air)    Gen--lying in bed, in pain, nauseous  CV--RRR  Lungs--CTA  Abd--obese, very TTP, rebound and guarding present  Ext--No unilateral edema or cords       Data:     Lab Results   Component Value Date    WBC 8.5 01/28/2018    HGB 11.6 (L) 01/28/2018    HCT 36.9 01/28/2018     01/28/2018     01/28/2018    POTASSIUM 3.6 01/28/2018    CHLORIDE 107 01/28/2018    CO2 26 01/28/2018    BUN 15 01/28/2018    CR 0.86 01/28/2018     (H) 01/28/2018    SED 32 (H) 01/05/2018    AST 17 01/05/2018    ALT 15 01/05/2018    ALKPHOS 71 01/05/2018    BILITOTAL 0.3 01/05/2018    INR 0.93 01/28/2018     Erum Boles MD

## 2018-01-28 NOTE — IP AVS SNAPSHOT
[facility] Observation Unit    [address] [place] [id number]    Phone:  [phone]                                       After Visit Summary   1/28/2018    Paulina Diana    MRN: [id number]           After Visit Summary Signature Page     I have received my discharge instructions, and my questions have been answered. I have discussed any challenges I see with this plan with the nurse or doctor.    ..........................................................................................................................................  Patient/Patient Representative Signature      ..........................................................................................................................................  Patient Representative Print Name and Relationship to Patient    ..................................................               ................................................  Date                                            Time    ..........................................................................................................................................  Reviewed by Signature/Title    ...................................................              ..............................................  Date                                                            Time

## 2018-01-28 NOTE — PROVIDER NOTIFICATION
Pt tearful, crying upon admission d/t pain. No IV pain medications available. MD paged to request IV Dilaudid.

## 2018-01-28 NOTE — OP NOTE
Procedure Date: 2018      PREOPERATIVE DIAGNOSES:   1.  A 37-year-old G4, P0-0-3-0 at approximately 7 weeks pregnant with acute pelvic pain.   2.  Prior ectopic pregnancy.   3.  Ultrasound consistent with ectopic pregnancy, suspected in the right adnexa measuring almost 8 cm, with quantitative hCG level 18,324.   4.  Morbid obesity.      POSTOPERATIVE DIAGNOSES:   1.  A 37-year-old G4, P0-0-3-0 at approximately 7 weeks pregnant with acute pelvic pain.   2.  Left tubal ectopic pregnancy with active bleeding.   3.  400 mL of blood in the pelvis.      PROCEDURE:  Laparoscopic evacuation of left ectopic pregnancy with left salpingectomy.      SURGEON:  Erum Boles MD      ASSISTANT:  Tara Espinal CST, [facility] [place]      ANESTHESIA:  General via endotracheal.      INDICATIONS:  This is a 37-year-old female who has a history of a prior right-sided ectopic pregnancy treated with surgery in , followed by a missed  treated with D&C in .  This pregnancy has been complicated by intermittent pelvic pain with appropriately rising quantitative hCG levels.  The patient was counseled in clinic regarding the serious nature of possible recurrent ectopic pregnancy, but declined surgery and strongly desired expectant management given the rising hCG level.  She presented to the emergency room the day of surgery with worsening pelvic pain consistent with her prior ectopic pregnancy.  Hemoglobin was 11.6 in the emergency room and her vital signs were very stable.  Imaging showed a normal right ovary, an unseen left ovary and a complex area in the right adnexa measuring 7.9 x 4.6 x 7.3, with no intrauterine pregnancy.  I was contacted and saw the patient urgently given the need for surgery.  The patient was counseled that we were unable to determine which side the ectopic would be on until surgery, but I did consent her for salpingectomy, given the size of the mass.      DESCRIPTION OF PROCEDURE:  The patient was  taken to the operating room where general anesthesia was found to be adequate.  She was prepped and draped in the normal sterile fashion in low lithotomy position in [facility].  A Dickson catheter was placed prior to the procedure.  Attention was turned to the abdomen where a 3 cm infraumbilical skin incision was made with a scalpel.  This was carried through the underlying layer of fascia with the Bovie.  The fascia was then grasped and entered sharply with Tovar scissors.  The corners of the fascial incision were tagged with 0 Vicryl suture for future identification.  The peritoneal cavity was then identified and entered sharply.  The endoscopic single site port was then introduced into the abdomen, and the abdomen was insufflated with CO2 gas to a pressure of 15 mmHg.  There was blood and clot throughout the pelvis and abdomen on entry.  This was copiously irrigated and removed to allow visualization of the pelvis.  The procedure was very difficult given the patient's morbid obesity and significant amount of pericolic fat.  The procedure took approximately 45 minutes longer than expected given difficult visualization with her size.        Steep Trendelenburg was used to visualize the pelvis.  The right tube and ovary were noted to be normal.  The uterus was normal-appearing.  The left fallopian tube was notable for a 7 cm mass with active bleeding consistent with a left tubal pregnancy.  The LigaSure device was then used to transect the left fallopian tube, including the mass, which was sent for pathologic evaluation.  Copious amounts of clot were then otherwise irrigated from the abdomen and pelvis, allowing visualization of the surgical site.  There was still a small amount of active bleeding along the prior perineal lesion that was treated with the LigaSure and any remnants of fallopian tube were also removed.  At this point, the area was hemostatic.  The abdomen and pelvis were again copiously irrigated and  dried and Glenn hemostatic agent was applied to the surgical area.  Pressure was removed from the abdomen and pelvis and there was no active bleeding from the surgical site.        At this point, the single site port was opened and all gas was exsufflated from the abdomen.  The fascia was closed with 2-0 PDS in a running suture.  The subcutaneous tissue was closed with a figure-of-eight fashion of 0 Vicryl.  The skin was closed with 4-0 Monocryl in a subcuticular fashion.  Sponge, lap and needle counts were correct x2 and there were otherwise no complications.        FINDINGS:  400 mL of blood and clot in the pelvis consistent with actively bleeding ectopic pregnancy.  7 cm mass in the left fallopian tube consistent with left ectopic.  Otherwise, normal ovaries and right fallopian tube.  Normal uterus.  Estimated blood loss from the surgery was 50 mL, with 400 cc in the pelvis prior to the surgical procedure.         RONIT ADAMS MD             D: 2018   T: 2018   MT: KAREN      Name:     WALTER HAYWARD   MRN:      [id number]        Account:        [id number]   :      1980           Procedure Date: 2018      Document: [id number]

## 2018-01-28 NOTE — ED PROVIDER NOTES
History     Chief Complaint:  Abdominal Pain    HPI   Paulina Diana is a  37 year old female with history of ectopic pregnancy in 2013 s/p salpingo-oophorectomy who presents to the emergency department today for evaluation of abdominal pain. The patient reports lower abdominal and pelvic pain for the last two days. The pain is constant and is worsened when she bears down to urinate. The patient reports she is approximately 5 weeks pregnant. At that time it was on her right side and required surgical intervention due to internal bleeding. She notes her current abdominal pain feels similar to previous ectopic pregnancies. She states her last menstrual period was . She has undergone ultrasound for her pregnancy and the fetal sac was very small. However her HCG levels have been consistently rising, [place] [address] on . The patient is followed in OB/GYN by Dr. Anai Mckeon. The patient denies any nausea, vomiting, diarrhea, or urinary symptoms.     Allergies:  Ceclor Cd [Cefaclor Monohydrate]      Medications:    levothyroxine (SYNTHROID/LEVOTHROID) 125 MCG tablet       Past Medical History:    Hypothyroidism     Past Surgical History:    D&C  Septal repair   Laparoscopic salpingo-oophorectomy     Family History:    History reviewed. No pertinent family history.      Social History:  The patient was accompanied to the ED by her .  Smoking Status: Never smoker  Smokeless Tobacco: Never used   Alcohol Use: No    Marital Status:        Review of Systems   Gastrointestinal: Positive for abdominal pain (lower). Negative for diarrhea, nausea and vomiting.   Genitourinary: Positive for pelvic pain. Negative for vaginal bleeding and vaginal pain.   All other systems reviewed and are negative.    Physical Exam     Patient Vitals for the past 24 hrs:   BP Temp Temp src Pulse Resp SpO2 Height Weight   18 1111 120/74 95.4  F (35.2  C) Oral - - 100 % - -   18 1030 98/52 - - - - 100  "% - -   01/28/18 1015 (!) 89/55 - - - - 100 % - -   01/28/18 1000 93/71 - - - - 98 % - -   01/28/18 0903 125/88 97.5  F (36.4  C) Oral 76 16 100 % 1.651 m (5' 5\") 108.9 kg (240 lb)      Physical Exam    Physical Exam   Constitutional:  Patient is oriented to person, place, and time. They appear well-developed and well-nourished. Mild distress secondary to abdominal pain.   HENT:   Mouth/Throat:   Oropharynx is clear and moist.   Eyes:    Conjunctivae normal and EOM are normal. Pupils are equal, round, and reactive to light.   Neck:    Normal range of motion.   Cardiovascular: Normal rate, regular rhythm and normal heart sounds.  Exam reveals no gallop and no friction rub.  No murmur heard.  Pulmonary/Chest:  Effort normal and breath sounds normal. Patient has no wheezes. Patient has no rales.   Abdominal:   Soft. Bowel sounds are normal. Patient exhibits no mass. Diffuse lower abdominal pain to palpation  Musculoskeletal:  Normal range of motion. Patient exhibits no edema.   Neurological:   Patient is alert and oriented to person, place, and time. Patient has normal strength. No cranial nerve deficit or sensory deficit.   Skin:   Skin is warm and dry. No rash noted. No erythema.   Psychiatric:   Patient has a normal mood and affect. Patient's behavior is normal. Judgment and thought content normal.      Emergency Department Course     Imaging:  Radiology findings were communicated with the patient who voiced understanding of the findings.    US OB less than 14 weeks w transvaginal:  IMPRESSION: A right-sided ectopic pregnancy cannot be excluded.  Report per radiology      Laboratory:  Laboratory findings were communicated with the patient who voiced understanding of the findings.    CBC: WBC 8.5, HGB 11.6 (L),   BMP: Glucose 101 (H), Calcium 8.2 (L), o/w WNL (Creatinine 0.86)   INR: 0.93   HCG Quantitative: 05743 (H)     Interventions:  0955 Morphine 4mg IV   0955 NS Bolus 1,000mL IV    1130 Fentanyl 50mcg IV "      Emergency Department Course:  Nursing notes and vitals reviewed.  0911 I performed an exam of the patient as documented above.   IV was inserted and blood was drawn for laboratory testing, results above.   The patient was sent for a OB ultrasound while in the emergency department, results above.    0942 I reviewed the patient's ultrasound imaging. Paged Dr. Anai Mckeon of OB/GYN, the patient's OB.   0942 I spoke with Dr. Bar of Radiology about the patient's ultrasound.   0954 I spoke with Dr. Erum Boles of the OB/GYN service regarding patient's presentation, findings, and plan of care. She will come see the patient in the emergency department prior to taking her to OR.   0957 I rechecked the patient and updated her on her ultrasound results. Patient returned from ultrasound. She was made aware of findings of ectopic pregnancy. Her pain has increased so she did request morphine.   1025 Dr. Boles will see the patient in pre op.   I personally reviewed the laboratory and imaging results with the Patient and answered all related questions prior to admission under Dr. Boles.  Impression & Plan      Medical Decision Making:  Paulina Diana is a 37 year old female with history of previous right sided ectopic pregnancy presenting with pain similar to her previous ectopic that started this morning. No vaginal bleeding. Her abdominal exam did show significant pelvic pain to soft touch without guarding or rebound but increasing pain. Ultrasound obtained immediatly confirming ectopic pregnancy with surrounding hemorrhage but no free fluid at this time. I Spoke with Dr. Boles of OB/GYN and the patient will require surgery for this immediatley. The patient has been NPO since last night. Her hemoglobin and vital signs are all stable. [place]CG [place] 05092. Dr. Boles came to the emergency department to admit the patient for surgery.     Diagnosis:    ICD-10-CM    1. Ectopic pregnancy without  intrauterine pregnancy, unspecified location O00.90 HCG quantitative pregnancy (blood)       Disposition:  Admitted under Dr. Boles of OB/GYN for further treatment.     Scribe Disclosure:  I, Doc Meyers, am serving as a scribe at 9:07 AM on 1/28/2018 to document services personally performed by Elmira Silva MD based on my observations and the provider's statements to me.    1/28/2018    EMERGENCY DEPARTMENT       Elmira Silva MD  01/28/18 1931

## 2018-01-28 NOTE — PROVIDER NOTIFICATION
Second page to Dr. Boles for IV pain medications. Patient doesn't feel she is able to take PO meds at this time. Patient tearful/crying d/t pain.

## 2018-01-28 NOTE — ANESTHESIA CARE TRANSFER NOTE
Patient: Paulina AGUIAR Adalgisa    Procedure(s):  LAPAROSCOPIC SINGLE SITE EVACUATION OF ECTOPIC PREGNANCY, LEFT SALPINGECTOMY - Wound Class: II-Clean Contaminated   - Wound Class: II-Clean Contaminated    Diagnosis: unknown   Diagnosis Additional Information: No value filed.    Anesthesia Type:   General, RSI, ETT     Note:  Airway :Nasal Cannula  Patient transferred to:PACU  Handoff Report: Identifed the Patient, Identified the Reponsible Provider, Reviewed the pertinent medical history, Discussed the surgical course, Reviewed Intra-OP anesthesia mangement and issues during anesthesia, Set expectations for post-procedure period and Allowed opportunity for questions and acknowledgement of understanding      Vitals: (Last set prior to Anesthesia Care Transfer)    CRNA VITALS  1/28/2018 1423 - 1/28/2018 1458      1/28/2018             Ht Rate: (!)  29    Resp Rate (observed): (!)  6    Resp Rate (set): 10                Electronically Signed By: ABDON Layne CRNA  January 28, 2018  2:58 PM

## 2018-01-29 LAB
ALBUMIN UR-MCNC: NEGATIVE MG/DL
APPEARANCE UR: CLEAR
BACTERIA #/AREA URNS HPF: ABNORMAL /HPF
BILIRUB UR QL STRIP: NEGATIVE
COLOR UR AUTO: YELLOW
GLUCOSE BLDC GLUCOMTR-MCNC: 116 MG/DL (ref 70–99)
GLUCOSE UR STRIP-MCNC: NEGATIVE MG/DL
HGB BLD-MCNC: 8.3 G/DL (ref 11.7–15.7)
HGB UR QL STRIP: ABNORMAL
KETONES UR STRIP-MCNC: NEGATIVE MG/DL
LEUKOCYTE ESTERASE UR QL STRIP: NEGATIVE
MUCOUS THREADS #/AREA URNS LPF: PRESENT /LPF
NITRATE UR QL: NEGATIVE
PH UR STRIP: 5.5 PH (ref 5–7)
RBC #/AREA URNS AUTO: 6 /HPF (ref 0–2)
SOURCE: ABNORMAL
SP GR UR STRIP: 1.01 (ref 1–1.03)
SQUAMOUS #/AREA URNS AUTO: 4 /HPF (ref 0–1)
UROBILINOGEN UR STRIP-MCNC: NORMAL MG/DL (ref 0–2)
WBC #/AREA URNS AUTO: 2 /HPF (ref 0–2)

## 2018-01-29 PROCEDURE — 82962 GLUCOSE BLOOD TEST: CPT

## 2018-01-29 PROCEDURE — 25000132 ZZH RX MED GY IP 250 OP 250 PS 637: Performed by: OBSTETRICS & GYNECOLOGY

## 2018-01-29 PROCEDURE — 25000128 H RX IP 250 OP 636: Performed by: OBSTETRICS & GYNECOLOGY

## 2018-01-29 PROCEDURE — 81001 URINALYSIS AUTO W/SCOPE: CPT | Performed by: EMERGENCY MEDICINE

## 2018-01-29 PROCEDURE — 40000936 ZZH STATISTIC OUTPATIENT (NON-OBS) NIGHT

## 2018-01-29 PROCEDURE — 85018 HEMOGLOBIN: CPT | Performed by: OBSTETRICS & GYNECOLOGY

## 2018-01-29 PROCEDURE — 40000934 ZZH STATISTIC OUTPATIENT (NON-OBS) DAY

## 2018-01-29 PROCEDURE — 40000935 ZZH STATISTIC OUTPATIENT (NON-OBS) EVE

## 2018-01-29 PROCEDURE — 36415 COLL VENOUS BLD VENIPUNCTURE: CPT | Performed by: OBSTETRICS & GYNECOLOGY

## 2018-01-29 RX ORDER — OXYCODONE HYDROCHLORIDE 5 MG/1
10 TABLET ORAL EVERY 4 HOURS PRN
Status: DISCONTINUED | OUTPATIENT
Start: 2018-01-29 | End: 2018-01-30 | Stop reason: HOSPADM

## 2018-01-29 RX ADMIN — OXYCODONE HYDROCHLORIDE 10 MG: 5 TABLET ORAL at 20:23

## 2018-01-29 RX ADMIN — Medication 0.5 MG: at 08:32

## 2018-01-29 RX ADMIN — OXYCODONE HYDROCHLORIDE 5 MG: 5 TABLET ORAL at 03:25

## 2018-01-29 RX ADMIN — OXYCODONE HYDROCHLORIDE 5 MG: 5 TABLET ORAL at 07:32

## 2018-01-29 RX ADMIN — IBUPROFEN 600 MG: 600 TABLET ORAL at 21:28

## 2018-01-29 RX ADMIN — OXYCODONE HYDROCHLORIDE 10 MG: 5 TABLET ORAL at 12:06

## 2018-01-29 RX ADMIN — OXYCODONE HYDROCHLORIDE 10 MG: 5 TABLET ORAL at 16:23

## 2018-01-29 RX ADMIN — ACETAMINOPHEN 650 MG: 325 TABLET, FILM COATED ORAL at 21:28

## 2018-01-29 ASSESSMENT — PAIN DESCRIPTION - DESCRIPTORS
DESCRIPTORS: CRAMPING
DESCRIPTORS: CRAMPING

## 2018-01-29 NOTE — PLAN OF CARE
Problem: Patient Care Overview  Goal: Plan of Care/Patient Progress Review  Outcome: No Change  VSS except needing 2L NC. A/O. Pain 8/10 in upper abdomen, slightly decreased with hot pack and IV Dilaudid. Is eating crackers and hoping to try oral pain meds soon. Lap site WNL. Dickson in place, urine cloudy. Unable to do IS education d/t severe pain. Patient taught to splint abdomen with pillow when coughing. Capnography in place. Parents at bedside.

## 2018-01-29 NOTE — PLAN OF CARE
Problem: Patient Care Overview  Goal: Plan of Care/Patient Progress Review  Outcome: Improving  VS stable, pt still is very emotional and tearful. Pain is better control with oxycodone 10 mg every 4 hours, pt did void good amount, declined bladder scan. Pt has been up ambulating in the room and in the hallway with much encouragement. Passing some gas, tolerated her diet.  Incentive spirometry up to 1000 with much encouragement.  Dr. Mena was updated, pt will be stayed over night.  MD will visit pt tomorrow. Anticipate home tomorrow.

## 2018-01-29 NOTE — PROGRESS NOTES
Afebrile.  Incision is clean and dry...  She is having severe abdominal pain not controlled with 5 mg oxy.  Dickson is out, but she has not voided yet.      Exam  Abdomen is tender in all quadrants    Imp  Post operative pain    Plan increase to Percocet #2, ambulate.  Observe through the afternoon, dismiss if improved.

## 2018-01-29 NOTE — PROGRESS NOTES
A/O. Hypertensive. Patient transferred to bed with hover mat. Unable to ambulate d/t severe neck pain. C-collar in place. Full skin assessment done: blanchable redness to heels, abrasion to right hip with mepilex placed, 2 skin tears to left shin with mepilex placed, barrier cream applied to non-blanchable, intact skin on coccyx, brauny/leonid bilateral lower extremities, mass in scrotum. Patient with gurgling quality to voice, stating he can't swallow. Bedside swallow exam attempted and failed with coughing, patient had to use Yankeur to remove secretions.

## 2018-01-29 NOTE — PROGRESS NOTES
VS stable, but has much more pain earlier this morning, crying in severe pain especially with palpitation to her abdomen. Refused bladder scan.  Pt is very emotional and tearful  Dr. Mena was here, medicated pt with dilaudid 0.5 mg IV with better relief than 5 mg oxycodone. Encourage pt to be up in the chair for meal, up ambulating if pain is under control, using IS often when pain is better control.  Pt stated she has minimal sleep last night.  Ate breakfast and voided 350 cc at 10 am.  Will continue to monitor pt, encourage get of bed.  Pt is resting at this time.

## 2018-01-29 NOTE — PLAN OF CARE
Problem: Patient Care Overview  Goal: Discharge Needs Assessment  Outcome: No Change  Alert and oriented. VSS on RA. Incision CDI. Reports pain, managed with oxycodone. Dickson removed, DTV at 0800. Up with stand by assist. Ambulated in hallway.

## 2018-01-30 VITALS
RESPIRATION RATE: 16 BRPM | HEART RATE: 76 BPM | OXYGEN SATURATION: 94 % | HEIGHT: 65 IN | TEMPERATURE: 96.2 F | SYSTOLIC BLOOD PRESSURE: 116 MMHG | WEIGHT: 240 LBS | BODY MASS INDEX: 39.99 KG/M2 | DIASTOLIC BLOOD PRESSURE: 65 MMHG

## 2018-01-30 LAB — COPATH REPORT: NORMAL

## 2018-01-30 PROCEDURE — 25000132 ZZH RX MED GY IP 250 OP 250 PS 637: Performed by: OBSTETRICS & GYNECOLOGY

## 2018-01-30 PROCEDURE — 40000934 ZZH STATISTIC OUTPATIENT (NON-OBS) DAY

## 2018-01-30 RX ORDER — FERROUS SULFATE 325(65) MG
325 TABLET ORAL
Qty: 30 TABLET | Refills: 2 | Status: SHIPPED | OUTPATIENT
Start: 2018-01-30 | End: 2018-09-06

## 2018-01-30 RX ORDER — OXYCODONE HYDROCHLORIDE 10 MG/1
10 TABLET ORAL EVERY 4 HOURS PRN
Qty: 10 TABLET | Refills: 0 | Status: SHIPPED | OUTPATIENT
Start: 2018-01-30 | End: 2018-09-06

## 2018-01-30 RX ADMIN — OXYCODONE HYDROCHLORIDE 10 MG: 5 TABLET ORAL at 00:32

## 2018-01-30 RX ADMIN — OXYCODONE HYDROCHLORIDE 10 MG: 5 TABLET ORAL at 08:24

## 2018-01-30 NOTE — PLAN OF CARE
Problem: Patient Care Overview  Goal: Plan of Care/Patient Progress Review  Outcome: Improving  A&Ox4. VSS on RA. Declines capno. SBA. Incision scant dried drainage otherwise CDI, steristrips. Passing gas, voiding. Regular diet. Pain managed w/ oxycodone. CMS intact. Denies n/t, nausea, dizziness, SOB. Saline locked. Continue to monitor.

## 2018-01-30 NOTE — PROGRESS NOTES
"Progress Note:    S: Pt is more comfortable, ambulating and requesting discharge.     O:  /65 (BP Location: Right arm)  Pulse 76  Temp 96.2  F (35.7  C) (Oral)  Resp 16  Ht 1.651 m (5' 5\")  Wt 108.9 kg (240 lb)  LMP 11/22/2017  SpO2 94%  Breastfeeding? No  BMI 39.94 kg/m2  Abdomen: soft and non tender, incision clean and dry. Bowel sounds present.   Extremities soft and tender.     A/P: HD #3 s/p salpingectomy for ectopic pregnancy.   1. Discharge home. Narcotics and iron given. Return to clinic in 2 weeks.     Peggy Thornton    "

## 2018-01-30 NOTE — PLAN OF CARE
Problem: Patient Care Overview  Goal: Plan of Care/Patient Progress Review  Outcome: No Change  VSS, A&O. Incision FARIDA w/ steristrips CDI. -Flatus, +BS. Pt voiding adequately. No vaginal bleeding. Pain 4-6/10 w/ oxycodone.

## 2018-01-30 NOTE — PLAN OF CARE
Problem: Patient Care Overview  Goal: Plan of Care/Patient Progress Review  Outcome: Adequate for Discharge Date Met: 01/30/18  Patient still waiting for the ride. She is taking rest at bed right now.  Patient is going to home.  Medication regimen and new medications discussed with patient and patient verbalizes understanding. Diet and activity and wound care discussed with patient. Upcoming appointments reviewed. No questions at this time.   Discharge instruction and medication packets handed to the patient.    At 11.41 patient was escorted to door#6.

## 2018-02-23 LAB
HPV ABSTRACT: NORMAL
PAP-ABSTRACT: NORMAL

## 2018-04-05 ENCOUNTER — TRANSFERRED RECORDS (OUTPATIENT)
Dept: FAMILY MEDICINE | Facility: CLINIC | Age: 38
End: 2018-04-05

## 2018-06-02 ENCOUNTER — HEALTH MAINTENANCE LETTER (OUTPATIENT)
Age: 38
End: 2018-06-02

## 2018-07-12 DIAGNOSIS — E03.9 ACQUIRED HYPOTHYROIDISM: ICD-10-CM

## 2018-07-12 RX ORDER — LEVOTHYROXINE SODIUM 125 UG/1
125 TABLET ORAL DAILY
Qty: 31 TABLET | Refills: 0 | Status: SHIPPED | OUTPATIENT
Start: 2018-07-12 | End: 2018-08-20

## 2018-07-19 NOTE — TELEPHONE ENCOUNTER
Refill for Levothyroxine 125mcg requested.   Last visit 08-08-17-last TSH 01-05-18  Has med ck/TSH sched 08-13-18  Will run out of RX before this appt  Dr. Ribera signed refill request for #31 tabs on 7/12/18.  Tiffanie Zarco RN

## 2018-08-20 DIAGNOSIS — E03.9 ACQUIRED HYPOTHYROIDISM: ICD-10-CM

## 2018-08-20 RX ORDER — LEVOTHYROXINE SODIUM 125 UG/1
TABLET ORAL
Qty: 31 TABLET | Refills: 0 | Status: SHIPPED | OUTPATIENT
Start: 2018-08-20 | End: 2018-09-06

## 2018-08-20 NOTE — TELEPHONE ENCOUNTER
LEVOTHYROXINE---HAS LAB APPOINTMENT SCHEDULED FOR 8/28/18 AND MD VISIT ON 9/6/18 WITH DR. MONZON.

## 2018-08-28 DIAGNOSIS — E03.9 ACQUIRED HYPOTHYROIDISM: Primary | ICD-10-CM

## 2018-08-29 LAB
T4 FREE SERPL-MCNC: 1.03 NG/DL (ref 0.82–1.77)
TSH BLD-ACNC: 12.51 UIU/ML (ref 0.45–4.5)

## 2018-09-06 ENCOUNTER — OFFICE VISIT (OUTPATIENT)
Dept: FAMILY MEDICINE | Facility: CLINIC | Age: 38
End: 2018-09-06

## 2018-09-06 VITALS
DIASTOLIC BLOOD PRESSURE: 76 MMHG | HEART RATE: 79 BPM | SYSTOLIC BLOOD PRESSURE: 116 MMHG | RESPIRATION RATE: 16 BRPM | OXYGEN SATURATION: 98 %

## 2018-09-06 DIAGNOSIS — E03.9 ACQUIRED HYPOTHYROIDISM: ICD-10-CM

## 2018-09-06 PROCEDURE — 99213 OFFICE O/P EST LOW 20 MIN: CPT | Performed by: FAMILY MEDICINE

## 2018-09-06 RX ORDER — LEVOTHYROXINE SODIUM 175 UG/1
175 TABLET ORAL DAILY
Qty: 30 TABLET | Refills: 11 | Status: SHIPPED | OUTPATIENT
Start: 2018-09-06 | End: 2019-09-21

## 2018-09-06 NOTE — MR AVS SNAPSHOT
After Visit Summary   9/6/2018    Paulina Diana    MRN: [id number]           Patient Information     Date Of Birth          1980        Visit Information        Provider Department      9/6/2018 4:30 PM Ismael Ribera MD [facility]        Today's Diagnoses     Acquired hypothyroidism           Follow-ups after your visit        Future tests that were ordered for you today     Open Future Orders        Priority Expected Expires Ordered    TSH ([facility]) Routine 11/9/2018 12/5/2018 9/6/2018            Who to contact     If you have questions or need follow up information about today's clinic visit or your schedule please contact [facility] directly at [phone].  Normal or non-critical lab and imaging results will be communicated to you by MyChart, letter or phone within 4 business days after the clinic has received the results. If you do not hear from us within 7 days, please contact the clinic through [employer] or phone. If you have a critical or abnormal lab result, we will notify you by phone as soon as possible.  Submit refill requests through [employer] or call your pharmacy and they will forward the refill request to us. Please allow 3 business days for your refill to be completed.          Additional Information About Your Visit        MyChart Information     [employer] gives you secure access to your electronic health record. If you see a primary care provider, you can also send messages to your care team and make appointments. If you have questions, please call your primary care clinic.  If you do not have a primary care provider, please call [phone] and they will assist you.        Care EveryWhere ID     This is your Care EveryWhere ID. This could be used by other organizations to access your [facility] medical records  [id number]        Your Vitals Were     Pulse Respirations Last Period Pulse Oximetry          79 16 11/22/2017 98%         Blood Pressure from  Last 3 Encounters:   09/06/18 116/76   01/30/18 116/65   01/05/18 134/89    Weight from Last 3 Encounters:   01/28/18 108.9 kg (240 lb)   01/05/18 125.9 kg (277 lb 8 oz)   05/27/16 119 kg (262 lb 4.8 oz)                 Today's Medication Changes          These changes are accurate as of 9/6/18  5:11 PM.  If you have any questions, ask your nurse or doctor.               These medicines have changed or have updated prescriptions.        Dose/Directions    levothyroxine 175 MCG tablet   Commonly known as:  SYNTHROID/LEVOTHROID   This may have changed:    - medication strength  - See the new instructions.   Used for:  Acquired hypothyroidism   Changed by:  Ismael Ribera MD        Dose:  175 mcg   Take 1 tablet (175 mcg) by mouth daily   Quantity:  30 tablet   Refills:  11            Where to get your medicines      These medications were sent to [facility] [address] [place] [address] [place], [place] - [address] AT [place] [address] [address], [place] [facility] [address]     Phone:  [phone]     levothyroxine 175 MCG tablet                Primary Care Provider Office Phone # Fax #    Ismael Ribera [phone] [address]  [facility] [address]        Equal Access to Services     BRYCE CHUN AH: Hadii aad ku hadasho Soomaali, waaxda luqadaha, qaybta kaalmada adeegyada, mo richardson. So [facility] [phone].    ATENCIÓN: Si habla español, tiene a thompson disposición servicios gratuitos de asistencia lingüística. Pawel al [phone].    We comply with applicable federal civil rights laws and [place] laws. We do not discriminate on the basis of race, color, national origin, age, disability, sex, sexual orientation, or gender identity.            Thank you!     Thank you for choosing [facility]  for your care. Our goal is always to provide you with excellent care. Hearing back from our patients is one way we can  continue to improve our services. Please take a few minutes to complete the written survey that you may receive in the mail after your visit with us. Thank you!             Your Updated Medication List - Protect others around you: Learn how to safely use, store and throw away your medicines at www.disposemymeds.org.          This list is accurate as of 9/6/18  5:11 PM.  Always use your most recent med list.                   Brand Name Dispense Instructions for use Diagnosis    levothyroxine 175 MCG tablet    SYNTHROID/LEVOTHROID    30 tablet    Take 1 tablet (175 mcg) by mouth daily    Acquired hypothyroidism

## 2018-10-02 ENCOUNTER — OFFICE VISIT (OUTPATIENT)
Dept: URGENT CARE | Facility: URGENT CARE | Age: 38
End: 2018-10-02
Payer: COMMERCIAL

## 2018-10-02 VITALS
RESPIRATION RATE: 16 BRPM | BODY MASS INDEX: 45.3 KG/M2 | DIASTOLIC BLOOD PRESSURE: 78 MMHG | HEART RATE: 78 BPM | OXYGEN SATURATION: 97 % | SYSTOLIC BLOOD PRESSURE: 129 MMHG | WEIGHT: 272.25 LBS | TEMPERATURE: 98 F

## 2018-10-02 DIAGNOSIS — R11.0 NAUSEA: ICD-10-CM

## 2018-10-02 DIAGNOSIS — R10.13 EPIGASTRIC PAIN: Primary | ICD-10-CM

## 2018-10-02 DIAGNOSIS — R19.7 DIARRHEA, UNSPECIFIED TYPE: ICD-10-CM

## 2018-10-02 LAB
ALBUMIN SERPL-MCNC: 3.6 G/DL (ref 3.4–5)
ALP SERPL-CCNC: 75 U/L (ref 40–150)
ALT SERPL W P-5'-P-CCNC: 22 U/L (ref 0–50)
AMYLASE SERPL-CCNC: 39 U/L (ref 30–110)
ANION GAP SERPL CALCULATED.3IONS-SCNC: 6 MMOL/L (ref 3–14)
AST SERPL W P-5'-P-CCNC: 20 U/L (ref 0–45)
BILIRUB SERPL-MCNC: 0.3 MG/DL (ref 0.2–1.3)
BUN SERPL-MCNC: 13 MG/DL (ref 7–30)
CALCIUM SERPL-MCNC: 8.6 MG/DL (ref 8.5–10.1)
CHLORIDE SERPL-SCNC: 106 MMOL/L (ref 94–109)
CO2 SERPL-SCNC: 28 MMOL/L (ref 20–32)
CREAT SERPL-MCNC: 0.9 MG/DL (ref 0.52–1.04)
GFR SERPL CREATININE-BSD FRML MDRD: 71 ML/MIN/1.7M2
GLUCOSE SERPL-MCNC: 81 MG/DL (ref 70–99)
POTASSIUM SERPL-SCNC: 3.8 MMOL/L (ref 3.4–5.3)
PROT SERPL-MCNC: 7.8 G/DL (ref 6.8–8.8)
SODIUM SERPL-SCNC: 140 MMOL/L (ref 133–144)

## 2018-10-02 PROCEDURE — 82150 ASSAY OF AMYLASE: CPT | Performed by: FAMILY MEDICINE

## 2018-10-02 PROCEDURE — 83690 ASSAY OF LIPASE: CPT | Performed by: FAMILY MEDICINE

## 2018-10-02 PROCEDURE — 99214 OFFICE O/P EST MOD 30 MIN: CPT | Performed by: FAMILY MEDICINE

## 2018-10-02 PROCEDURE — 80053 COMPREHEN METABOLIC PANEL: CPT | Performed by: FAMILY MEDICINE

## 2018-10-02 PROCEDURE — 36415 COLL VENOUS BLD VENIPUNCTURE: CPT | Performed by: FAMILY MEDICINE

## 2018-10-02 NOTE — MR AVS SNAPSHOT
After Visit Summary   10/2/2018    Paulina Diana    MRN: [id number]           Patient Information     Date Of Birth          1980        Visit Information        Provider Department      10/2/2018 8:10 PM Mei Sapp MD [facility]        Today's Diagnoses     Epigastric pain    -  1    Nausea        Diarrhea, unspecified type           Follow-ups after your visit        Who to contact     If you have questions or need follow up information about today's clinic visit or your schedule please contact [facility] directly at [phone].  Normal or non-critical lab and imaging results will be communicated to you by [employer], letter or phone within 4 business days after the clinic has received the results. If you do not hear from us within 7 days, please contact the clinic through [employer] or phone. If you have a critical or abnormal lab result, we will notify you by phone as soon as possible.  Submit refill requests through [employer] or call your pharmacy and they will forward the refill request to us. Please allow 3 business days for your refill to be completed.          Additional Information About Your Visit        MyChart Information     [employer] gives you secure access to your electronic health record. If you see a primary care provider, you can also send messages to your care team and make appointments. If you have questions, please call your primary care clinic.  If you do not have a primary care provider, please call [phone] and they will assist you.        Care EveryWhere ID     This is your Care EveryWhere ID. This could be used by other organizations to access your [facility] medical records  [id number]        Your Vitals Were     Pulse Temperature Respirations Last Period Pulse Oximetry BMI (Body Mass Index)    78 98  F (36.7  C) (Oral) 16 11/22/2017 97% 45.3 kg/m2       Blood Pressure from Last 3 Encounters:   10/02/18 129/78    09/06/18 116/76   01/30/18 116/65    Weight from Last 3 Encounters:   10/02/18 272 lb 4 oz (123.5 kg)   01/28/18 240 lb (108.9 kg)   01/05/18 277 lb 8 oz (125.9 kg)              We Performed the Following     Amylase     Comprehensive metabolic panel     Lipase          Today's Medication Changes          These changes are accurate as of 10/2/18  9:11 PM.  If you have any questions, ask your nurse or doctor.               Start taking these medicines.        Dose/Directions    lidocaine (viscous) 15 mL, alum & mag hydroxide-simethicone 15 mL GI Cocktail   Used for:  Epigastric pain   Started by:  Mei Sapp MD        Dose:  30 mL   Take 30 mLs by mouth once for 1 dose   Quantity:  30 mL   Refills:  0            Where to get your medicines      Some of these will need a paper prescription and others can be bought over the counter.  Ask your nurse if you have questions.     You don't need a prescription for these medications     lidocaine (viscous) 15 mL, alum & mag hydroxide-simethicone 15 mL GI Cocktail                Primary Care Provider Office Phone # Fax #    Ismael Ribera [phone] [address] [place] [address]        Equal Access to Services     BRYCE CHUN AH: Hadii carol goode hadasho Sotrishaali, waaxda luqadaha, qaybta kaalmada adeegyada, mo richardson. So [facility] [phone].    ATENCIÓN: Si habla español, tiene a thompson disposición servicios gratuitos de asistencia lingüística. Llame al [phone].    We comply with applicable federal civil rights laws and [place] laws. We do not discriminate on the basis of race, color, national origin, age, disability, sex, sexual orientation, or gender identity.            Thank you!     Thank you for choosing [facility]  for your care. Our goal is always to provide you with excellent care. Hearing back from our patients is one way we can continue to improve our services.  Please take a few minutes to complete the written survey that you may receive in the mail after your visit with us. Thank you!             Your Updated Medication List - Protect others around you: Learn how to safely use, store and throw away your medicines at www.disposemymeds.org.          This list is accurate as of 10/2/18  9:11 PM.  Always use your most recent med list.                   Brand Name Dispense Instructions for use Diagnosis    levothyroxine 175 MCG tablet    SYNTHROID/LEVOTHROID    30 tablet    Take 1 tablet (175 mcg) by mouth daily    Acquired hypothyroidism       lidocaine (viscous) 15 mL, alum & mag hydroxide-simethicone 15 mL GI Cocktail     30 mL    Take 30 mLs by mouth once for 1 dose    Epigastric pain       PEPCID PO      Take 20 mg by mouth as needed for heartburn

## 2018-10-03 ENCOUNTER — NURSE TRIAGE (OUTPATIENT)
Dept: NURSING | Facility: CLINIC | Age: 38
End: 2018-10-03

## 2018-10-03 ENCOUNTER — HOSPITAL ENCOUNTER (EMERGENCY)
Facility: CLINIC | Age: 38
Discharge: HOME | End: 2018-10-03
Admitting: EMERGENCY MEDICINE
Payer: COMMERCIAL

## 2018-10-03 VITALS
HEIGHT: 65 IN | DIASTOLIC BLOOD PRESSURE: 82 MMHG | WEIGHT: 260 LBS | HEART RATE: 77 BPM | RESPIRATION RATE: 20 BRPM | TEMPERATURE: 97.9 F | SYSTOLIC BLOOD PRESSURE: 134 MMHG | BODY MASS INDEX: 43.32 KG/M2 | OXYGEN SATURATION: 98 %

## 2018-10-03 LAB — LIPASE SERPL-CCNC: 128 U/L (ref 73–393)

## 2018-10-03 PROCEDURE — 40000268 ZZH STATISTIC NO CHARGES

## 2018-10-03 NOTE — NURSING NOTE
The following medication was given: 15 cc's of Viscous Lidocaine mixed with 15 cc's Maalox given at 8:40pm    MEDICATION: Viscous Lidocaine 2%   ROUTE: PO  SITE: mouth  DOSE: 15cc's  LOT #: [id number]  : Cam  EXPIRATION DATE:  05/2021  ND: 79922-080-36    MEDICATION: Mylanta(ARIELLE-LANTA)  ROUTE: PO  SITE: mouth  DOSE: 15cc's  LOT #: [id number]  :  [employer]  EXPIRATION DATE: 04/2020  NDC: 93456-198-62    JOLEEN Jones MA

## 2018-10-03 NOTE — PROGRESS NOTES
SUBJECTIVE  HPI:  Paulina Diana is a 38 year old female with h/o hiatal hernia  who presents with the CC of abdominal/pelvic pain.    Pain is located in the epigastric area, with radiation to None.  The pain is characterized as sharp, and at worst is a level 8 on a scale of 1-10.  Pain has been present for 1 day(s) and is fluctuating.  EXACERBATING FACTORS: nothing  RELIEVING FACTORS: nothing.  ASSOCIATED SX: nausea and diarrhea., several times   No blood in the stool and has been noticing a lot of flatulence   It all started yesterday morning   She had pizza and also ice creme for dinner the previous night   Then woke up with abd pain in the epigastric area along with diarrhea and lot of flatulence  Today she had an egg sandwich for lunch 1 hrs following that started noticing similar sharp pain along with nausea symptoms   Her pain episodes last for 10 - 15 mins   Now has no pain ,   She denies any chest pain, chest heaviness , chest tightness or chest pressure  sob    Past Medical History:   Diagnosis Date     Thyroid disease     hypothyroid     Current Outpatient Prescriptions   Medication Sig Dispense Refill     Famotidine (PEPCID PO) Take 20 mg by mouth as needed for heartburn       levothyroxine (SYNTHROID/LEVOTHROID) 175 MCG tablet Take 1 tablet (175 mcg) by mouth daily 30 tablet 11     lidocaine (viscous) 15 mL, alum & mag hydroxide-simethicone 15 mL GI Cocktail Take 30 mLs by mouth once for 1 dose 30 mL 0     Social History   Substance Use Topics     Smoking status: Never Smoker     Smokeless tobacco: Never Used      Comment:  on occasion     Alcohol use No      Comment: rare       ROS:  10 point ROS of systems including Constitutional, Eyes, Respiratory, Cardiovascular,  Genitourinary, Integumentary, Muscularskeletal, Psychiatric were all negative except for pertinent positives noted in my HPI       Results for orders placed or performed in visit on 10/02/18   Amylase   Result Value Ref Range     Amylase 39 30 - 110 U/L   Comprehensive metabolic panel   Result Value Ref Range    Sodium 140 133 - 144 mmol/L    Potassium 3.8 3.4 - 5.3 mmol/L    Chloride 106 94 - 109 mmol/L    Carbon Dioxide 28 20 - 32 mmol/L    Anion Gap 6 3 - 14 mmol/L    Glucose 81 70 - 99 mg/dL    Urea Nitrogen 13 7 - 30 mg/dL    Creatinine 0.90 0.52 - 1.04 mg/dL    GFR Estimate 71 >60 mL/min/1.7m2    GFR Estimate If Black 85 >60 mL/min/1.7m2    Calcium 8.6 8.5 - 10.1 mg/dL    Bilirubin Total 0.3 0.2 - 1.3 mg/dL    Albumin 3.6 3.4 - 5.0 g/dL    Protein Total 7.8 6.8 - 8.8 g/dL    Alkaline Phosphatase 75 40 - 150 U/L    ALT 22 0 - 50 U/L    AST 20 0 - 45 U/L         OBJECTIVE:  /78  Pulse 78  Temp 98  F (36.7  C) (Oral)  Resp 16  Wt 272 lb 4 oz (123.5 kg)  LMP 11/22/2017  SpO2 97%  BMI 45.3 kg/m2  GENERAL APPEARANCE: healthy, alert and no distress  EYES: EOMI,  PERRL, conjunctiva clear  HENT: ear canals and TM's normal.  Nose and mouth without ulcers, erythema or lesions  NECK: supple, nontender, no lymphadenopathy  RESP: lungs clear to auscultation - no rales, rhonchi or wheezes  CV: regular rates and rhythm, normal S1 S2, no murmur noted  ABDOMEN: obese, normal bowel sounds, tenderness mild epigastric and RUQ no guarding or rebound tenderness noted   SKIN: no suspicious lesions or rashes  PSYCH: mentation appears normal    ASSESSMENT:    Paulina was seen today for abdominal pain and diarrhea.    Diagnoses and all orders for this visit:    Epigastric pain  -     Lipase  -     Amylase  -     Comprehensive metabolic panel  -     lidocaine (viscous) 15 mL, alum & mag hydroxide-simethicone 15 mL GI Cocktail; Take 30 mLs by mouth once for 1 dose  Advised to do omeprazole for next 2 weeks   Watch her diet closely  Avoid fatty foods       Nausea    Diarrhea, unspecified type      Discussed with pt symptoms could be related to gastritis , pancreatitis , gall bladder disease   Discussed about trying modifying diet   Avoid fatty foods    Follow up with pcp for Abd ultrasound   If gi cocktail does not help   She needs to go to ER   Reviewed all labs with pt   Discussed if nausea or abd pain worsens should follow up in the ER     See Orders in Nadege Sapp MD

## 2018-10-03 NOTE — TELEPHONE ENCOUNTER
She was seen at urgent care yesterday and was told that if she's not improving, she should go to the ER to get an ultrasound of her gall bladder done.  She was wondering if they'd write an order to [facility], to get an ultrasound done. I advised she has to be seen again to do that. I asked if she is doing worse. She said she is. I asked if she's going to go to the ER. She said she probably will go to the ER. I told her good, to do that.  Susan Tenorio RN-[facility]

## 2018-10-04 ENCOUNTER — HOSPITAL REPORTS SCAN (OUTPATIENT)
Dept: FAMILY MEDICINE | Facility: CLINIC | Age: 38
End: 2018-10-04

## 2019-09-21 DIAGNOSIS — E03.9 ACQUIRED HYPOTHYROIDISM: ICD-10-CM

## 2019-09-22 RX ORDER — LEVOTHYROXINE SODIUM 175 UG/1
TABLET ORAL
Qty: 30 TABLET | Refills: 0 | Status: SHIPPED | OUTPATIENT
Start: 2019-09-22 | End: 2019-11-18

## 2019-09-30 ENCOUNTER — HEALTH MAINTENANCE LETTER (OUTPATIENT)
Age: 39
End: 2019-09-30

## 2019-11-18 DIAGNOSIS — E03.9 ACQUIRED HYPOTHYROIDISM: ICD-10-CM

## 2019-11-18 RX ORDER — LEVOTHYROXINE SODIUM 175 UG/1
TABLET ORAL
Qty: 30 TABLET | Refills: 0 | Status: SHIPPED | OUTPATIENT
Start: 2019-11-18 | End: 2019-12-16 | Stop reason: DRUGHIGH

## 2019-12-12 ENCOUNTER — OFFICE VISIT (OUTPATIENT)
Dept: FAMILY MEDICINE | Facility: CLINIC | Age: 39
End: 2019-12-12

## 2019-12-12 VITALS
SYSTOLIC BLOOD PRESSURE: 116 MMHG | HEART RATE: 65 BPM | OXYGEN SATURATION: 99 % | WEIGHT: 258 LBS | RESPIRATION RATE: 16 BRPM | BODY MASS INDEX: 42.93 KG/M2 | DIASTOLIC BLOOD PRESSURE: 72 MMHG

## 2019-12-12 DIAGNOSIS — E66.01 MORBID OBESITY (H): ICD-10-CM

## 2019-12-12 DIAGNOSIS — E03.9 ACQUIRED HYPOTHYROIDISM: Primary | ICD-10-CM

## 2019-12-12 DIAGNOSIS — H57.89 RED EYE: ICD-10-CM

## 2019-12-12 DIAGNOSIS — Z13.6 SCREENING FOR HEART DISEASE: ICD-10-CM

## 2019-12-12 DIAGNOSIS — G44.209 TENSION HEADACHE: ICD-10-CM

## 2019-12-12 LAB
% GRANULOCYTES: 70.1 % (ref 42.2–75.2)
ERYTHROCYTE [SEDIMENTATION RATE] IN BLOOD: 19 MM/HR (ref 0–20)
HCT VFR BLD AUTO: 38.9 % (ref 35–46)
HEMOGLOBIN: 12.7 G/DL (ref 11.8–15.5)
LYMPHOCYTES NFR BLD AUTO: 23.6 % (ref 20.5–51.1)
MCH RBC QN AUTO: 29.3 PG (ref 27–31)
MCHC RBC AUTO-ENTMCNC: 32.6 G/DL (ref 33–37)
MCV RBC AUTO: 90 FL (ref 80–100)
MONOCYTES NFR BLD AUTO: 6.3 % (ref 1.7–9.3)
PLATELET # BLD AUTO: 347 K/UL (ref 140–450)
RBC # BLD AUTO: 4.32 X10/CMM (ref 3.7–5.2)
WBC # BLD AUTO: 8.9 X10/CMM (ref 3.8–11)

## 2019-12-12 PROCEDURE — 99214 OFFICE O/P EST MOD 30 MIN: CPT | Performed by: FAMILY MEDICINE

## 2019-12-12 PROCEDURE — 36415 COLL VENOUS BLD VENIPUNCTURE: CPT | Performed by: FAMILY MEDICINE

## 2019-12-12 PROCEDURE — 85025 COMPLETE CBC W/AUTO DIFF WBC: CPT | Performed by: FAMILY MEDICINE

## 2019-12-12 PROCEDURE — 85651 RBC SED RATE NONAUTOMATED: CPT | Performed by: FAMILY MEDICINE

## 2019-12-12 NOTE — PROGRESS NOTES
Temple headaches and gets a little redness in the lateral left eye,  Paulina has had episodes of left eye lateral redness that have developed over the past couple of months.  She is seen a optometrist who has given her some steroid drops which has helped somewhat but the redness comes back and she also has left temple letter discomfort which feels better if she rubs with her fingers.  She feels that there is pressure as part of the syndrome in that temple area.  Her optometrist has not done any blood work but has recommended she see us here today to get started on either an MRI or not myology consult.  Thought to check for autoimmune    Problem(s) Oriented visit      ROS:  General and CV completed and negative except as noted above     HISTORY:   reports no history of alcohol use.   reports that she has never smoked. She has never used smokeless tobacco.    Past Medical History:   Diagnosis Date     Thyroid disease      Past Surgical History:   Procedure Laterality Date     DILATION AND CURETTAGE SUCTION N/A 5/27/2016    Procedure: DILATION AND CURETTAGE SUCTION;  Surgeon: Natacha Goyal MD;  Location: [facility] [place]     ENT SURGERY      septal repair     LAPAROSCOPIC EVACUATION ECTOPIC PREGNANCY Left 1/28/2018    Procedure: LAPAROSCOPIC EVACUATION ECTOPIC PREGNANCY;  LAPAROSCOPIC SINGLE SITE EVACUATION OF ECTOPIC PREGNANCY, LEFT SALPINGECTOMY;  Surgeon: Erum Boles MD;  Location:  OR     LAPAROSCOPIC SALPINGECTOMY Left 1/28/2018    Procedure: LAPAROSCOPIC SALPINGECTOMY;;  Surgeon: Erum Boles MD;  Location:  OR     LAPAROSCOPIC SALPINGO-OOPHORECTOMY  11/2/2013    Procedure: LAPAROSCOPIC SALPINGO-OOPHORECTOMY;  Single port laparoscopic, Evacuation of hemo-peritonium and Products of Conception;  Surgeon: Peggy Thornton MD;  Location:  OR     NOSE SURGERY      age 12       EXAM:  BP: 116/72   Pulse: 65    Temp: Data Unavailable    Wt Readings from Last 2 Encounters:   12/12/19 117 kg (258  lb)   10/03/18 117.9 kg (260 lb)       BMI= Body mass index is 42.93 kg/m .    EXAM:  APPEARANCE: = Relaxed and in no distress  Conj/Eyelids = noninjected and lids and lashes are without inflammation  PERRLA/Irises = Pupils Round Reactive to Light and Irisis without inflammation  Neck = No anterior or posterior adenopathy appreciated.  Thyroid = Not enlarged and no masses felt  Resp effort = Calm regular breathing  Breath Sounds = Good air movement with no rales or rhonchi in any lung fields  Heart Rate, Rythym, & sounds (no Murm)  = Regular rate and rythym with no S3, S4, or murmer appreciated.  Carotid Art's = Pulses full and equal and no bruits appreciated  Abdomen = Soft, nontender, no masses, & bowel sounds in all quadrants  Liver/Spleen = Normal span and no splenomegaly noted  Digits and Nails = FROM in all finger joints, no nail dystrophy  Ext (edema) = No pretibial edema noted or elsewhere  Musculsktl =  Strength and ROM of major joints are within normal limits  SKIN = absent significant rashes or lesions   Recent/Remote Memory = Alert and Oriented x 3  Mood/Affect = Cooperative and interested      Assessment/Plan:  Paulina was seen today for consult.    Diagnoses and all orders for this visit:    Acquired hypothyroidism  -     TSH ([facility])    Morbid obesity (H)    Tension headache  -     CBC with Diff/Plt ([facility])  -     PIEDAD w/Reflex ([facility])  -     Sed Rate Westergren ([facility])  -     C-Reactive Protein  Quant ([facility])    Screening for heart disease  -     Comp. Metabolic Panel (14) ([facility])  -     Lipid Panel ([facility])    >25 min spent with patient, greater than 50% spent on discussion/education/planning, etc. About The primary encounter diagnosis was Acquired hypothyroidism. Diagnoses of Morbid obesity (H), Tension headache, and Screening for heart disease were also pertinent to this visit.        COUNSELING:   reports that she has never smoked. She has never used smokeless tobacco.    Estimated body mass index  "is 42.93 kg/m  as calculated from the following:    Height as of 10/3/18: 1.651 m (5' 5\").    Weight as of this encounter: 117 kg (258 lb).       Appropriate preventive services were discussed with this patient, including applicable screening as appropriate for cardiovascular disease, diabetes, osteopenia/osteoporosis, and glaucoma.  As appropriate for age/gender, discussed screening for colorectal cancer, prostate cancer, breast cancer, and cervical cancer. Checklist reviewing preventive services available has been given to the patient.    Reviewed patients plan of care and provided an AVS. The Basic Care Plan (routine screening as documented in Health Maintenance) for Paulina meets the Care Plan requirement. This Care Plan has been established and reviewed with the  Patient.      The following health maintenance items are reviewed in Epic and correct as of today:  Health Maintenance   Topic Date Due     PREVENTIVE CARE VISIT  1982     HIV SCREENING  06/03/1997     HPV  06/03/2003     PHQ-2  01/01/2019     INFLUENZA VACCINE (1) 09/01/2019     PAP  04/01/2020     DTAP/TDAP/TD IMMUNIZATION (3 - Td) 09/04/2022     COLONOSCOPY  10/20/2026     IPV IMMUNIZATION  Aged Out     MENINGITIS IMMUNIZATION  Aged Out       Ismael Ribera  [facility]  For any issues my office # is [phone]              "

## 2019-12-14 LAB
ALBUMIN SERPL-MCNC: 4.2 G/DL (ref 3.5–5.5)
ALBUMIN/GLOB SERPL: 1.5 {RATIO} (ref 1.2–2.2)
ALP SERPL-CCNC: 60 IU/L (ref 39–117)
ALT SERPL-CCNC: 14 IU/L (ref 0–32)
ANA DIRECT: NEGATIVE
AST SERPL-CCNC: 14 IU/L (ref 0–40)
BILIRUB SERPL-MCNC: 0.4 MG/DL (ref 0–1.2)
BUN SERPL-MCNC: 13 MG/DL (ref 6–20)
BUN/CREATININE RATIO: 13 (ref 9–23)
CALCIUM SERPL-MCNC: 9 MG/DL (ref 8.7–10.2)
CHLORIDE SERPLBLD-SCNC: 103 MMOL/L (ref 96–106)
CHOLEST SERPL-MCNC: 185 MG/DL (ref 100–199)
CREAT SERPL-MCNC: 0.98 MG/DL (ref 0.57–1)
CRP SERPL-MCNC: 5 MG/L (ref 0–10)
EGFR IF AFRICN AM: 85 ML/MIN/1.73
EGFR IF NONAFRICN AM: 74 ML/MIN/1.73
GLOBULIN, TOTAL: 2.8 G/DL (ref 1.5–4.5)
GLUCOSE SERPL-MCNC: 78 MG/DL (ref 65–99)
HDLC SERPL-MCNC: 62 MG/DL
LDL/HDL RATIO: 1.8 RATIO (ref 0–3.2)
LDLC SERPL CALC-MCNC: 111 MG/DL (ref 0–99)
POTASSIUM SERPL-SCNC: 4.1 MMOL/L (ref 3.5–5.2)
PROT SERPL-MCNC: 7 G/DL (ref 6–8.5)
SODIUM SERPL-SCNC: 143 MMOL/L (ref 134–144)
TOTAL CO2: 27 MMOL/L (ref 20–29)
TRIGL SERPL-MCNC: 61 MG/DL (ref 0–149)
TSH BLD-ACNC: 27.46 UIU/ML (ref 0.45–4.5)
VLDLC SERPL CALC-MCNC: 12 MG/DL (ref 5–40)

## 2019-12-16 ENCOUNTER — TELEPHONE (OUTPATIENT)
Dept: FAMILY MEDICINE | Facility: CLINIC | Age: 39
End: 2019-12-16

## 2019-12-16 DIAGNOSIS — E03.9 ACQUIRED HYPOTHYROIDISM: Primary | ICD-10-CM

## 2019-12-16 RX ORDER — LEVOTHYROXINE SODIUM 125 UG/1
250 TABLET ORAL DAILY
Qty: 60 TABLET | Refills: 3 | Status: SHIPPED | OUTPATIENT
Start: 2019-12-16 | End: 2020-04-27

## 2019-12-16 NOTE — TELEPHONE ENCOUNTER
----- Message from Ismael Ribera MD sent at 12/15/2019  8:53 PM CST -----  If she has been taking her SYNTHROID  WE SHOULD INCREASE HER  MCG DAILY.

## 2019-12-16 NOTE — TELEPHONE ENCOUNTER
Called patient with 12/12/19 lab results - all WNL except thyroid.   On current dose of levothyroxine 175mcg QD since 9/6/19.   Patient reports did not take her dose on 12/12/19 and probably one other day that week, otherwise is consistently taking QD.   Reports lot of stress with work and moving --- always tired.   Component TSH T4 Free   Latest Ref Rng & Units 0.450 - 4.500 uIU/mL 0.82 - 1.77 ng/dL   2/24/2016 2.050    9/27/2016 4.660 (H)    8/8/2017 14.990 (H)    8/11/2017  0.78 (L)   8/28/2018 12.510 (H) 1.03   12/12/2019 27.460 (H)      Plan: patient will increase levothyroxine to 250mcg QD as Dr. Ribera recommends and repeat thyroid labs in 6 weeks. Rx sent to pharmacy for levothyroxine 125mcg sig 2 po QD and future lab orders placed.  Tiffanie Zarco RN

## 2020-03-22 ENCOUNTER — HEALTH MAINTENANCE LETTER (OUTPATIENT)
Age: 40
End: 2020-03-22

## 2020-04-27 DIAGNOSIS — E03.9 ACQUIRED HYPOTHYROIDISM: ICD-10-CM

## 2020-04-27 RX ORDER — LEVOTHYROXINE SODIUM 125 UG/1
TABLET ORAL
Qty: 60 TABLET | Refills: 3 | Status: ON HOLD | OUTPATIENT
Start: 2020-04-27 | End: 2020-08-07

## 2020-06-22 ENCOUNTER — TELEPHONE (OUTPATIENT)
Dept: FAMILY MEDICINE | Facility: CLINIC | Age: 40
End: 2020-06-22

## 2020-06-22 NOTE — TELEPHONE ENCOUNTER
thyroid med changed in Dec 2019- needs thyroid labs rechecked before next refill  [place] [facility] [place], [place]  Mellissa Gonzalez MA

## 2020-06-23 VITALS — TEMPERATURE: 99 F

## 2020-06-23 DIAGNOSIS — E03.9 ACQUIRED HYPOTHYROIDISM: ICD-10-CM

## 2020-06-23 PROCEDURE — 36415 COLL VENOUS BLD VENIPUNCTURE: CPT | Performed by: FAMILY MEDICINE

## 2020-06-24 LAB
T4 FREE SERPL-MCNC: 1.71 NG/DL (ref 0.82–1.77)
TSH BLD-ACNC: 0.02 UIU/ML (ref 0.45–4.5)

## 2020-06-26 ENCOUNTER — TELEPHONE (OUTPATIENT)
Dept: FAMILY MEDICINE | Facility: CLINIC | Age: 40
End: 2020-06-26

## 2020-06-26 DIAGNOSIS — E03.9 HYPOTHYROIDISM: Primary | ICD-10-CM

## 2020-06-26 NOTE — TELEPHONE ENCOUNTER
Component      Latest Ref Rng & Units 12/12/2019 6/23/2020   TSH      0.450 - 4.500 uIU/mL 27.460 (H) 0.016 (L)   T4 Free      0.82 - 1.77 ng/dL  1.71   TSH      0.40 - 4.00 mU/L       Levothyroxine dose increased 12/2019 from 175mcg to 250mcg (two 125mcg tabs per day).   Plan: per Dr. Ribera, patient to decrease to 224mcg (two 112mcg tabs per day) and repeat TSH and free T4 in 6 weeks.

## 2020-07-01 RX ORDER — LEVOTHYROXINE SODIUM 112 UG/1
224 TABLET ORAL DAILY
Qty: 120 TABLET | Refills: 2 | Status: SHIPPED | OUTPATIENT
Start: 2020-07-01 | End: 2021-02-15

## 2020-07-01 NOTE — TELEPHONE ENCOUNTER
Patient called back and agrees to plan. Rx sent to pharmacy and future lab order placed for thyroid labs in 6-8 weeks.   Tiffanie Zarco RN

## 2020-08-07 ENCOUNTER — HOSPITAL ENCOUNTER (OUTPATIENT)
Facility: CLINIC | Age: 40
End: 2020-08-07
Admitting: OBSTETRICS & GYNECOLOGY
Payer: COMMERCIAL

## 2020-08-07 ENCOUNTER — HOSPITAL ENCOUNTER (OUTPATIENT)
Facility: CLINIC | Age: 40
Discharge: HOME | End: 2020-08-07
Attending: OBSTETRICS & GYNECOLOGY | Admitting: OBSTETRICS & GYNECOLOGY
Payer: COMMERCIAL

## 2020-08-07 VITALS
BODY MASS INDEX: 44.9 KG/M2 | HEIGHT: 64 IN | DIASTOLIC BLOOD PRESSURE: 82 MMHG | SYSTOLIC BLOOD PRESSURE: 131 MMHG | WEIGHT: 263 LBS | TEMPERATURE: 97.8 F | RESPIRATION RATE: 16 BRPM

## 2020-08-07 LAB
HCT VFR BLD AUTO: 41 %
HEMOGLOBIN: 13.1 G/DL (ref 11.7–15.7)
PLATELET # BLD AUTO: 367 10^9/L

## 2020-08-07 PROCEDURE — 25000128 H RX IP 250 OP 636: Performed by: OBSTETRICS & GYNECOLOGY

## 2020-08-07 PROCEDURE — 96401 CHEMO ANTI-NEOPL SQ/IM: CPT

## 2020-08-07 RX ORDER — METHOTREXATE 25 MG/ML
50 INJECTION INTRA-ARTERIAL; INTRAMUSCULAR; INTRATHECAL; INTRAVENOUS ONCE
Status: COMPLETED | OUTPATIENT
Start: 2020-08-07 | End: 2020-08-07

## 2020-08-07 RX ADMIN — METHOTREXATE 116 MG: 25 SOLUTION INTRA-ARTERIAL; INTRAMUSCULAR; INTRATHECAL; INTRAVENOUS at 21:34

## 2020-08-07 ASSESSMENT — MIFFLIN-ST. JEOR: SCORE: 1857.96

## 2020-08-08 NOTE — PLAN OF CARE
Pt here for Methotrexate treatment. HCG, labs, US all obtained at office and scanned in to pts chart. Consent signed with Dr. Boles in office. Reviewed medical history.

## 2020-08-08 NOTE — PLAN OF CARE
Spoke w/ HIRAM Mckeon MD via phone to update that pt had Methotrexate injection per written orders from LARISA Boles MD. Pt has been monitored for 30 min post injection and VSS, feel well. Discharge orders were written by LARISA Boles MD.   HIRAM Mckeon states pt should return to clinic on Monday 8/10 to have HCG levels checked.

## 2020-08-08 NOTE — DISCHARGE INSTRUCTIONS
Methotrexate to Treat an Ectopic Pregnancy  An ectopic pregnancy is when a fertilized egg implants outside the womb (uterus). In most cases, it implants in a fallopian tube. This is a tube that goes from the uterus to an ovary. When this happens, the embryo can t grow normally. In some cases, it may stop growing quickly. Or it may grow until the fallopian tube tears (ruptures). This can cause severe bleeding and a risk for death for the mother.  Methotrexate is a medicine that stops the embryo from growing. The tissue is then absorbed by the mother s body. This treatment can prevent the rupture, bleeding, and risk of death to the mother. Methotrexate is often used instead of surgery to remove the fetus. Surgery has risks, like bleeding, infection, scarring of the fallopian tube, infertility, and the risks of anesthesia.  Having methotrexate treatment  Methotrexate is most often given by a shot (injection) into a muscle. It can also be given through an IV.  After having the shot, you may have:    Mild abdominal pain or cramping    Nausea and vomiting    Diarrhea    Fatigue  Care at home  Once you are home, you can resume normal activities as you are able. You will have some bleeding and pain. While you are recovering, you can use acetaminophen for pain if advised by your healthcare provider.  Until your healthcare provider says it s OK, do NOT:    Take anti-inflammatory medicines (NSAIDs), like aspirin, ibuprofen, or naproxen    Have foods or vitamins that contain folic acid or folate (for example, prenatal vitamins have folate)    Drink alcohol    Take penicillin or certain other antibiotics    Use tampons or douche    Have sexual intercourse  Make sure to:    Avoid the sun during the first week after your shot. Sun can cause a rash during this time.    Use birth control for at least 3 months after treatment.    Talk with a counselor if you feel sadness or grief after pregnancy loss.  Follow up  You will have  blood tests several times in the weeks after you have the shot. This is to make sure that your pregnancy hormone (HCG) level is getting lower. This shows that the fetus is no longer growing. It may take up to 4 weeks for your level to drop to zero. Most women need only one shot. If HCG levels are not low enough, your healthcare provider may give you a second shot. In some cases, this treatment does not work and surgery is needed. Your healthcare provider can tell you more about surgery for ectopic pregnancy.  When to call the healthcare provider  Call your healthcare provider if you have:    Lower abdominal pain that gets worse or doesn t go away    Heavy vaginal bleeding    Nausea or vomiting that needs treatment    Dizziness, weakness, or fainting    Fever of 100.4 F (38 C) or higher  Date Last Reviewed: 6/1/2016 2000-2019 [employer]. [address] [place] [address], [place] [address]. All rights reserved. This information is not intended as a substitute for professional medical care. Always follow your healthcare professional's instructions.

## 2020-08-10 ENCOUNTER — HOSPITAL ENCOUNTER (INPATIENT)
Facility: CLINIC | Age: 40
LOS: 1 days | Discharge: HOME | DRG: 833 | End: 2020-08-12
Attending: EMERGENCY MEDICINE | Admitting: OBSTETRICS & GYNECOLOGY
Payer: COMMERCIAL

## 2020-08-10 ENCOUNTER — APPOINTMENT (OUTPATIENT)
Dept: ULTRASOUND IMAGING | Facility: CLINIC | Age: 40
DRG: 833 | End: 2020-08-10
Attending: EMERGENCY MEDICINE
Payer: COMMERCIAL

## 2020-08-10 DIAGNOSIS — O00.80 OTHER ECTOPIC PREGNANCY WITHOUT INTRAUTERINE PREGNANCY: ICD-10-CM

## 2020-08-10 LAB
ABO + RH BLD: NORMAL
ABO + RH BLD: NORMAL
ALBUMIN UR-MCNC: 30 MG/DL
APPEARANCE UR: ABNORMAL
B-HCG SERPL-ACNC: 3202 IU/L (ref 0–5)
BASOPHILS # BLD AUTO: 0 10E9/L (ref 0–0.2)
BASOPHILS NFR BLD AUTO: 0.1 %
BILIRUB UR QL STRIP: NEGATIVE
BLD GP AB SCN SERPL QL: NORMAL
BLOOD BANK CMNT PATIENT-IMP: NORMAL
COLOR UR AUTO: YELLOW
DIFFERENTIAL METHOD BLD: NORMAL
EOSINOPHIL # BLD AUTO: 0.1 10E9/L (ref 0–0.7)
EOSINOPHIL NFR BLD AUTO: 0.7 %
ERYTHROCYTE [DISTWIDTH] IN BLOOD BY AUTOMATED COUNT: 14 % (ref 10–15)
GLUCOSE UR STRIP-MCNC: NEGATIVE MG/DL
HCT VFR BLD AUTO: 39 % (ref 35–47)
HGB BLD-MCNC: 12.5 G/DL (ref 11.7–15.7)
HGB UR QL STRIP: ABNORMAL
IMM GRANULOCYTES # BLD: 0 10E9/L (ref 0–0.4)
IMM GRANULOCYTES NFR BLD: 0.2 %
KETONES UR STRIP-MCNC: 10 MG/DL
LABORATORY COMMENT REPORT: NORMAL
LEUKOCYTE ESTERASE UR QL STRIP: ABNORMAL
LYMPHOCYTES # BLD AUTO: 1.3 10E9/L (ref 0.8–5.3)
LYMPHOCYTES NFR BLD AUTO: 15.4 %
MCH RBC QN AUTO: 28.7 PG (ref 26.5–33)
MCHC RBC AUTO-ENTMCNC: 32.1 G/DL (ref 31.5–36.5)
MCV RBC AUTO: 89 FL (ref 78–100)
MONOCYTES # BLD AUTO: 0 10E9/L (ref 0–1.3)
MONOCYTES NFR BLD AUTO: 0.2 %
MUCOUS THREADS #/AREA URNS LPF: PRESENT /LPF
NEUTROPHILS # BLD AUTO: 7 10E9/L (ref 1.6–8.3)
NEUTROPHILS NFR BLD AUTO: 83.4 %
NITRATE UR QL: NEGATIVE
NRBC # BLD AUTO: 0 10*3/UL
NRBC BLD AUTO-RTO: 0 /100
PH UR STRIP: 6 PH (ref 5–7)
PLATELET # BLD AUTO: 327 10E9/L (ref 150–450)
RBC # BLD AUTO: 4.36 10E12/L (ref 3.8–5.2)
RBC #/AREA URNS AUTO: >182 /HPF (ref 0–2)
SARS-COV-2 RNA SPEC QL NAA+PROBE: NEGATIVE
SARS-COV-2 RNA SPEC QL NAA+PROBE: NORMAL
SOURCE: ABNORMAL
SP GR UR STRIP: 1.03 (ref 1–1.03)
SPECIMEN EXP DATE BLD: NORMAL
SPECIMEN SOURCE: NORMAL
SPECIMEN SOURCE: NORMAL
SQUAMOUS #/AREA URNS AUTO: 2 /HPF (ref 0–1)
UROBILINOGEN UR STRIP-MCNC: NORMAL MG/DL (ref 0–2)
WBC # BLD AUTO: 8.4 10E9/L (ref 4–11)
WBC #/AREA URNS AUTO: 69 /HPF (ref 0–5)

## 2020-08-10 PROCEDURE — 25000128 H RX IP 250 OP 636: Performed by: OBSTETRICS & GYNECOLOGY

## 2020-08-10 PROCEDURE — 84702 CHORIONIC GONADOTROPIN TEST: CPT | Performed by: EMERGENCY MEDICINE

## 2020-08-10 PROCEDURE — 99291 CRITICAL CARE FIRST HOUR: CPT | Mod: 25

## 2020-08-10 PROCEDURE — U0003 INFECTIOUS AGENT DETECTION BY NUCLEIC ACID (DNA OR RNA); SEVERE ACUTE RESPIRATORY SYNDROME CORONAVIRUS 2 (SARS-COV-2) (CORONAVIRUS DISEASE [COVID-19]), AMPLIFIED PROBE TECHNIQUE, MAKING USE OF HIGH THROUGHPUT TECHNOLOGIES AS DESCRIBED BY CMS-2020-01-R: HCPCS | Performed by: EMERGENCY MEDICINE

## 2020-08-10 PROCEDURE — 86901 BLOOD TYPING SEROLOGIC RH(D): CPT | Performed by: EMERGENCY MEDICINE

## 2020-08-10 PROCEDURE — 76801 OB US < 14 WKS SINGLE FETUS: CPT

## 2020-08-10 PROCEDURE — 25800030 ZZH RX IP 258 OP 636: Performed by: EMERGENCY MEDICINE

## 2020-08-10 PROCEDURE — 86850 RBC ANTIBODY SCREEN: CPT | Performed by: EMERGENCY MEDICINE

## 2020-08-10 PROCEDURE — 96374 THER/PROPH/DIAG INJ IV PUSH: CPT

## 2020-08-10 PROCEDURE — C9803 HOPD COVID-19 SPEC COLLECT: HCPCS

## 2020-08-10 PROCEDURE — 86900 BLOOD TYPING SEROLOGIC ABO: CPT | Performed by: EMERGENCY MEDICINE

## 2020-08-10 PROCEDURE — 81001 URINALYSIS AUTO W/SCOPE: CPT | Performed by: EMERGENCY MEDICINE

## 2020-08-10 PROCEDURE — 85025 COMPLETE CBC W/AUTO DIFF WBC: CPT | Performed by: EMERGENCY MEDICINE

## 2020-08-10 PROCEDURE — 25800030 ZZH RX IP 258 OP 636: Performed by: OBSTETRICS & GYNECOLOGY

## 2020-08-10 PROCEDURE — 25000128 H RX IP 250 OP 636: Performed by: EMERGENCY MEDICINE

## 2020-08-10 PROCEDURE — 96376 TX/PRO/DX INJ SAME DRUG ADON: CPT

## 2020-08-10 PROCEDURE — 96361 HYDRATE IV INFUSION ADD-ON: CPT

## 2020-08-10 PROCEDURE — 96375 TX/PRO/DX INJ NEW DRUG ADDON: CPT

## 2020-08-10 RX ORDER — HYDROMORPHONE HYDROCHLORIDE 1 MG/ML
0.5 INJECTION, SOLUTION INTRAMUSCULAR; INTRAVENOUS; SUBCUTANEOUS ONCE
Status: COMPLETED | OUTPATIENT
Start: 2020-08-10 | End: 2020-08-10

## 2020-08-10 RX ORDER — ONDANSETRON 2 MG/ML
4 INJECTION INTRAMUSCULAR; INTRAVENOUS EVERY 30 MIN PRN
Status: DISCONTINUED | OUTPATIENT
Start: 2020-08-10 | End: 2020-08-10

## 2020-08-10 RX ORDER — SODIUM CHLORIDE, SODIUM LACTATE, POTASSIUM CHLORIDE, CALCIUM CHLORIDE 600; 310; 30; 20 MG/100ML; MG/100ML; MG/100ML; MG/100ML
INJECTION, SOLUTION INTRAVENOUS CONTINUOUS
Status: DISCONTINUED | OUTPATIENT
Start: 2020-08-10 | End: 2020-08-12 | Stop reason: HOSPADM

## 2020-08-10 RX ORDER — SODIUM CHLORIDE 9 MG/ML
INJECTION, SOLUTION INTRAVENOUS CONTINUOUS
Status: DISCONTINUED | OUTPATIENT
Start: 2020-08-10 | End: 2020-08-10

## 2020-08-10 RX ORDER — KETOROLAC TROMETHAMINE 30 MG/ML
30 INJECTION, SOLUTION INTRAMUSCULAR; INTRAVENOUS EVERY 6 HOURS PRN
Status: DISCONTINUED | OUTPATIENT
Start: 2020-08-10 | End: 2020-08-12 | Stop reason: HOSPADM

## 2020-08-10 RX ORDER — HYDROMORPHONE HYDROCHLORIDE 1 MG/ML
0.5 INJECTION, SOLUTION INTRAMUSCULAR; INTRAVENOUS; SUBCUTANEOUS
Status: DISCONTINUED | OUTPATIENT
Start: 2020-08-10 | End: 2020-08-10

## 2020-08-10 RX ORDER — NALOXONE HYDROCHLORIDE 0.4 MG/ML
.1-.4 INJECTION, SOLUTION INTRAMUSCULAR; INTRAVENOUS; SUBCUTANEOUS
Status: DISCONTINUED | OUTPATIENT
Start: 2020-08-10 | End: 2020-08-12 | Stop reason: HOSPADM

## 2020-08-10 RX ADMIN — HYDROMORPHONE HYDROCHLORIDE 0.5 MG: 1 INJECTION, SOLUTION INTRAMUSCULAR; INTRAVENOUS; SUBCUTANEOUS at 17:59

## 2020-08-10 RX ADMIN — SODIUM CHLORIDE 1000 ML: 9 INJECTION, SOLUTION INTRAVENOUS at 16:09

## 2020-08-10 RX ADMIN — KETOROLAC TROMETHAMINE 30 MG: 30 INJECTION, SOLUTION INTRAMUSCULAR at 20:37

## 2020-08-10 RX ADMIN — HYDROMORPHONE HYDROCHLORIDE 0.5 MG: 1 INJECTION, SOLUTION INTRAMUSCULAR; INTRAVENOUS; SUBCUTANEOUS at 16:25

## 2020-08-10 RX ADMIN — SODIUM CHLORIDE, POTASSIUM CHLORIDE, SODIUM LACTATE AND CALCIUM CHLORIDE: 600; 310; 30; 20 INJECTION, SOLUTION INTRAVENOUS at 20:36

## 2020-08-10 RX ADMIN — HYDROMORPHONE HYDROCHLORIDE 0.5 MG: 1 INJECTION, SOLUTION INTRAMUSCULAR; INTRAVENOUS; SUBCUTANEOUS at 16:09

## 2020-08-10 RX ADMIN — ONDANSETRON 4 MG: 2 INJECTION INTRAMUSCULAR; INTRAVENOUS at 16:24

## 2020-08-10 RX ADMIN — HYDROMORPHONE HYDROCHLORIDE 1 MG: 1 INJECTION, SOLUTION INTRAMUSCULAR; INTRAVENOUS; SUBCUTANEOUS at 20:57

## 2020-08-10 ASSESSMENT — ENCOUNTER SYMPTOMS
DIAPHORESIS: 1
ABDOMINAL PAIN: 1

## 2020-08-10 NOTE — PHARMACY-ADMISSION MEDICATION HISTORY
Pharmacy Medication History  Admission medication history interview status for the 8/10/2020  admission is complete. See EPIC admission navigator for prior to admission medications     Medication history sources: Patient  Medication history source reliability: Good  Adherence assessment: Good    Significant changes made to the medication list:  No changes       Additional medication history information:   Reviewed sure scripts and care every where. Called patient and she verified that she is only taking the Levothyroxine.     Medication reconciliation completed by provider prior to medication history? No    Time spent in this activity: 5 minutes       Prior to Admission medications    Medication Sig Last Dose Taking? Auth Provider   levothyroxine (SYNTHROID/LEVOTHROID) 112 MCG tablet Take 2 tablets (224 mcg) by mouth daily 8/10/2020 at AM Yes Ismael Ribera MD

## 2020-08-10 NOTE — ED PROVIDER NOTES
History   Chief Complaint:  Abdominal Pain       The history is provided by the patient.      Paulina Diana is a 40 year old female that is  with history of ectopic pregancies who presents for evaluation of abdominal pain. The patient notes that today around 1100 she had noticed that she had abdominal cramping and that around 1200 she had started to feel diaphoretic. She states that here she has right sided abdominal pain that radiates to the left. Spotting but no heavy bleeding.  She states that her OB GYN is Dr. Anai Mckeon. She reports that she has had 2 tubal; 1 miscarriage, and with current ectopic pregnancy.     Allergies:  Ceclor Cd [Cefaclor Monohydrate]    Medications:   Levothyroxine    Past Medical History:    Thyroid disease  Ectopic pregnancies  Obesity     Past Surgical History:    Dilation and curettage suction  Septal repair  Salpingectomy -oophorectomy   Ectopic pregnancy evacuation, 2018    Family History:    No past pertinent family history.     Social History:  Smoking status: never smoker  Alcohol use: No  Drug use: No  PCP: Ismael Ribera  Presents to the ED with   Up to date on immunization     Review of Systems   Constitutional: Positive for diaphoresis.   Gastrointestinal: Positive for abdominal pain.     10 point review of systems performed and is negative except as above and in HPI.     Physical Exam     Patient Vitals for the past 24 hrs:   BP Temp Temp src Pulse Heart Rate SpO2   08/10/20 1730 118/71 -- -- 62 -- 100 %   08/10/20 1715 128/67 -- -- 52 -- 100 %   08/10/20 1613 -- -- -- -- -- 100 %   08/10/20 1603 -- -- -- 59 -- 100 %   08/10/20 1600 139/85 -- -- -- -- --   08/10/20 1500 122/76 98.6  F (37  C) Temporal -- 64 --       Physical Exam  General: Resting on the gurney, appears uncomfortable  Head:  The scalp, face, and head appear normal  Mouth/Throat: Mucus membranes are moist  CV:  Regular rate    Normal S1 and S2  No pathological murmur   Resp:  Breath sounds clear  and equal bilaterally    Non-labored, no retractions or accessory muscle use    No coarseness    No wheezing   GI:  Abdomen is soft, no rigidity    Focal right lower quadrant tenderness and suprapubic tenderness to palpation.     No guarding. No rebound. No left sided pain.   MS:  Normal motor assessment of all extremities.    Good capillary refill noted.  Skin:   No rash or lesions noted.  Neuro:   Speech is normal and fluent. No apparent deficit.  Psych: Awake. Alert.  Normal affect.      Appropriate interactions.       Emergency Department Course     Imaging:  Radiology findings were communicated with the patient who voiced understanding of the findings.    US OB <14 weeks w transvaginal:   1. No intrauterine pregnancy is identified.   2. No convincing sonographic evidence for an adnexal mass to correspond to the reported known ectopic pregnancy. No free fluid in the pelvis.     Reading per radiology     Laboratory:  Laboratory findings were communicated with the patient who voiced understanding of the findings.    CBC: WBC 8.4, HGB 12.5,   HCG Quantitative: 3,202(H)     ABO/Rh type and screen: A positive ; Antibody negative     COVID-19 virus Swab PCR: pending      Interventions:  1609 Dilaudid 0.5mg IV injection   1609 NS 1L IV Bolus   1624 Zofran 4mg IV injection    1625 Dilaudid 0.5mg IV injection     Emergency Department Course:   Nursing notes and vitals reviewed.    1603 I performed an exam of the patient as documented above.     1610 IV was inserted and blood was drawn for laboratory testing, results above.     1627 I spoke with Dr. Mckeon of the OB GYN service  regarding patient's presentation, findings, and plan of care.     1643 The patient was sent for a US OB while in the emergency department, results above.      1759 I spoke with Dr. Goyal of the OB GYN service regarding patient's presentation, findings, and plan of care and accepts the patient for admission for observation.     1752 I personally  reviewed the results with the patient patient and answered all related questions prior to admission.       Impression & Plan      Medical Decision Making:  Paulina Diana is a 40 year old female , with history of ectopic pregnancies, who presents for evalution of abdominal pain.  She is pregnant.  Hcg = 3,202.  She had a recent US showing her current pregnancy is a cornual ectopic pregnancy and was given methotrexate.   Ultrasound obtained and shows no evidence of current ectopic.  Blood pressure 118/71 and heart rate 64.  Rh positive so rhogam not indicated.   She appears hemodynamically stable. OB contacted and results discussed. Based on dates, labs, vital signs US, and patient symptoms OB recommends admission for observation.  Orders placed per OB recommendations.     Covid-19  Paulina Diana was evaluated during a global COVID-19 pandemic, which necessitated consideration that the patient might be at risk for infection with the SARS-CoV-2 virus that causes COVID-19.   Applicable protocols for evaluation were followed during the patient's care.   COVID-19 was considered as part of the patient's evaluation. The plan for testing is:  a test was obtained during this visit.    Diagnosis:    ICD-10-CM    1. Other ectopic pregnancy without intrauterine pregnancy  O00.80        Disposition:   The patient is admitted into the care of Dr. Goyal of OB GYN.     Scribe Disclosure:  IDenise, am serving as a scribe at 4:01 PM on 8/10/2020 to document services personally performed by Evette Moulton MD based on my observations and the provider's statements to me.  [facility] EMERGENCY DEPARTMENT         Evette Moulton MD  08/10/20 1190

## 2020-08-10 NOTE — ED NOTES
[facility]  ED Nurse Handoff Report    ED Chief complaint: Abdominal Pain      ED Diagnosis:   Final diagnoses:   Other ectopic pregnancy without intrauterine pregnancy       Code Status: Full Code    Allergies:   Allergies   Allergen Reactions     Amandeep Samuel [Cefaclor Monohydrate] Hives       Patient Story: Pt presents to the ED with severe abdominal pain that started this am.  Pt reports she was diagnosed with an ectopic pregnancy while 7 weeks pregnant last Friday and given methotrexate.  Pt reports she felt fine over the weekend, but developed severe lower abdominal pain today.  Focused Assessment:  Pt presents with severe lower abdominal pain rated 10/10, this was controlled with IV dilaudid 0.5mg x3 doses.  Other vital signs unremarkable.  Us report reads as follows:  1. No intrauterine pregnancy is identified.   2. No convincing sonographic evidence for an adnexal mass to  correspond to the reported known ectopic pregnancy. No free fluid in  the pelvis    Treatments and/or interventions provided: 18 G IV in left ac, 20 G in right AC.  Pt given 1 liter NS, 4 mg IV zofran, and 0.5mg IV dilaudid x3.  Patient's response to treatments and/or interventions: Pt reports pain controlled after IV medications.    To be done/followed up on inpatient unit:  NA    Does this patient have any cognitive concerns?: Disorientation to person    Activity level - Baseline/Home:  Independent  Activity Level - Current:   Stand with Assist    Patient's Preferred language: English   Needed?: No    Isolation: None  Infection: Not Applicable  Bariatric?: No    Vital Signs:   Vitals:    08/10/20 1613 08/10/20 1715 08/10/20 1730 08/10/20 1830   BP:  128/67 118/71 122/70   Pulse:  52 62 58   Temp:       TempSrc:       SpO2: 100% 100% 100% 99%       Cardiac Rhythm:     Was the PSS-3 completed:   Yes  What interventions are required if any?  NA             Family Comments:  at bedside, involved in  cares.  OBS brochure/video discussed/provided to patient/family: Yes              Name of person given brochure if not patient: NA              Relationship to patient: NA    For the majority of the shift this patient's behavior was Green.   Behavioral interventions performed were NA.    ED NURSE PHONE NUMBER: [phone]

## 2020-08-11 PROBLEM — O00.80 PREGNANCY, ECTOPIC, CORNUAL OR CERVICAL: Status: ACTIVE | Noted: 2020-08-11

## 2020-08-11 LAB
ALBUMIN SERPL-MCNC: 2.8 G/DL (ref 3.4–5)
ALP SERPL-CCNC: 46 U/L (ref 40–150)
ALT SERPL W P-5'-P-CCNC: 29 U/L (ref 0–50)
ANION GAP SERPL CALCULATED.3IONS-SCNC: 4 MMOL/L (ref 3–14)
AST SERPL W P-5'-P-CCNC: 18 U/L (ref 0–45)
B-HCG SERPL-ACNC: 2835 IU/L (ref 0–5)
BILIRUB SERPL-MCNC: 0.8 MG/DL (ref 0.2–1.3)
BUN SERPL-MCNC: 8 MG/DL (ref 7–30)
CALCIUM SERPL-MCNC: 7.7 MG/DL (ref 8.5–10.1)
CHLORIDE SERPL-SCNC: 111 MMOL/L (ref 94–109)
CO2 SERPL-SCNC: 25 MMOL/L (ref 20–32)
CREAT SERPL-MCNC: 0.81 MG/DL (ref 0.52–1.04)
ERYTHROCYTE [DISTWIDTH] IN BLOOD BY AUTOMATED COUNT: 14.2 % (ref 10–15)
GFR SERPL CREATININE-BSD FRML MDRD: >90 ML/MIN/{1.73_M2}
GLUCOSE SERPL-MCNC: 92 MG/DL (ref 70–99)
HCT VFR BLD AUTO: 33.6 % (ref 35–47)
HGB BLD-MCNC: 10.7 G/DL (ref 11.7–15.7)
MCH RBC QN AUTO: 28.6 PG (ref 26.5–33)
MCHC RBC AUTO-ENTMCNC: 31.8 G/DL (ref 31.5–36.5)
MCV RBC AUTO: 90 FL (ref 78–100)
PLATELET # BLD AUTO: 257 10E9/L (ref 150–450)
POTASSIUM SERPL-SCNC: 3.6 MMOL/L (ref 3.4–5.3)
PROT SERPL-MCNC: 6.1 G/DL (ref 6.8–8.8)
RBC # BLD AUTO: 3.74 10E12/L (ref 3.8–5.2)
SODIUM SERPL-SCNC: 140 MMOL/L (ref 133–144)
WBC # BLD AUTO: 9.2 10E9/L (ref 4–11)

## 2020-08-11 PROCEDURE — 12000000 ZZH R&B MED SURG/OB

## 2020-08-11 PROCEDURE — 36415 COLL VENOUS BLD VENIPUNCTURE: CPT | Performed by: OBSTETRICS & GYNECOLOGY

## 2020-08-11 PROCEDURE — 84702 CHORIONIC GONADOTROPIN TEST: CPT | Performed by: OBSTETRICS & GYNECOLOGY

## 2020-08-11 PROCEDURE — 25000128 H RX IP 250 OP 636: Performed by: OBSTETRICS & GYNECOLOGY

## 2020-08-11 PROCEDURE — 85027 COMPLETE CBC AUTOMATED: CPT | Performed by: OBSTETRICS & GYNECOLOGY

## 2020-08-11 PROCEDURE — 80053 COMPREHEN METABOLIC PANEL: CPT | Performed by: OBSTETRICS & GYNECOLOGY

## 2020-08-11 PROCEDURE — 25800030 ZZH RX IP 258 OP 636: Performed by: OBSTETRICS & GYNECOLOGY

## 2020-08-11 RX ORDER — DIPHENHYDRAMINE HCL 50 MG
50 CAPSULE ORAL EVERY 6 HOURS PRN
Status: DISCONTINUED | OUTPATIENT
Start: 2020-08-11 | End: 2020-08-12 | Stop reason: HOSPADM

## 2020-08-11 RX ORDER — DIPHENHYDRAMINE HYDROCHLORIDE 50 MG/ML
25 INJECTION INTRAMUSCULAR; INTRAVENOUS EVERY 6 HOURS PRN
Status: DISCONTINUED | OUTPATIENT
Start: 2020-08-11 | End: 2020-08-12 | Stop reason: HOSPADM

## 2020-08-11 RX ORDER — METHOTREXATE 25 MG/ML
50 INJECTION INTRA-ARTERIAL; INTRAMUSCULAR; INTRATHECAL; INTRAVENOUS ONCE
Status: COMPLETED | OUTPATIENT
Start: 2020-08-11 | End: 2020-08-11

## 2020-08-11 RX ORDER — ACETAMINOPHEN 325 MG/1
975 TABLET ORAL EVERY 4 HOURS PRN
Status: DISCONTINUED | OUTPATIENT
Start: 2020-08-11 | End: 2020-08-12 | Stop reason: HOSPADM

## 2020-08-11 RX ORDER — ONDANSETRON 4 MG/1
4-8 TABLET, ORALLY DISINTEGRATING ORAL EVERY 6 HOURS PRN
Status: DISCONTINUED | OUTPATIENT
Start: 2020-08-11 | End: 2020-08-12 | Stop reason: HOSPADM

## 2020-08-11 RX ORDER — OXYCODONE HYDROCHLORIDE 5 MG/1
5 TABLET ORAL EVERY 4 HOURS PRN
Status: DISCONTINUED | OUTPATIENT
Start: 2020-08-11 | End: 2020-08-12 | Stop reason: HOSPADM

## 2020-08-11 RX ADMIN — KETOROLAC TROMETHAMINE 30 MG: 30 INJECTION, SOLUTION INTRAMUSCULAR at 23:56

## 2020-08-11 RX ADMIN — SODIUM CHLORIDE, POTASSIUM CHLORIDE, SODIUM LACTATE AND CALCIUM CHLORIDE: 600; 310; 30; 20 INJECTION, SOLUTION INTRAVENOUS at 12:34

## 2020-08-11 RX ADMIN — KETOROLAC TROMETHAMINE 30 MG: 30 INJECTION, SOLUTION INTRAMUSCULAR at 09:20

## 2020-08-11 RX ADMIN — ONDANSETRON 4 MG: 4 TABLET, ORALLY DISINTEGRATING ORAL at 23:49

## 2020-08-11 RX ADMIN — SODIUM CHLORIDE, POTASSIUM CHLORIDE, SODIUM LACTATE AND CALCIUM CHLORIDE: 600; 310; 30; 20 INJECTION, SOLUTION INTRAVENOUS at 20:07

## 2020-08-11 RX ADMIN — METHOTREXATE 117 MG: 25 SOLUTION INTRA-ARTERIAL; INTRAMUSCULAR; INTRATHECAL; INTRAVENOUS at 13:05

## 2020-08-11 ASSESSMENT — ACTIVITIES OF DAILY LIVING (ADL)
ADLS_ACUITY_SCORE: 10
ADLS_ACUITY_SCORE: 10

## 2020-08-11 ASSESSMENT — MIFFLIN-ST. JEOR: SCORE: 1879.73

## 2020-08-11 NOTE — H&P
Paulina is a 40 year old [id number] LMP 20. Admitted through the ED this evening for observation. Has known ectopic pregnancy diagnosed by abnormally rising HCGs and pelvic ultrasound. Received a dose of Methotrexate on 20. Came in to office this am for follow up quantitative HCG. When she was called with the results  she complained of recent onset of severe pelvic pain. She was instructed to go to the ED for evaluation.     ED evaluation revealed normal appearing adnexa and uterus. No free pelvic fluid was noted. Hgb stable at 12.5 and HCG= 3202.    Most recent HCGs are as follow: 20 2576 (day that Methotrexate was given). 8/10/20 am 3632 and 8/10/20 pm 3202.     PMH  Thyroid disease, Ectopic pregnancy x3, obesity    MEDs  Levothyroxine    ALLERGIES Ceclor    SURGERIES  S&C, LSC salpingotomy, LSC salpingectomy, Septal repair     FH unremarkable    SH , does not smoke nor drink    VSS afebrile    HEENT nl  Abdomen soft and nontender  Pelvic deferred  Extremities nl    A: 39 yo [id number] presents to ED for severe pelvic pain s/p Methotrexate administration on 20 for presumed ectopic cornu pregnancy. U/S unremarkable. No evidence for rupture, bleeding, etc.    P: Due to severe pelvic pain will admit for overnight observation and treatment of the pain. Will repeat HCG and hgb in am. Sooner is worsening symptoms. Patient understands that she may need surgery.

## 2020-08-11 NOTE — PROGRESS NOTES
"GYN Progress Note    S: Pt is admitted with increased discomfort with likely cornual ectopic pregnancy s/p Methotrexate 8/7. She has had a drop in Hcg (8/7- 2576, 8/10 am-3632, 8/10 pm- 3202), this AM is pending. Last PM she notes that she had signfigant pain and discomfort in her lower pelvis. This morning she reports that it is more of an ache. She did pass a \"sac like\" tissue mass last night.     O:  /64 (BP Location: Right arm)   Pulse 63   Temp 97.8  F (36.6  C) (Oral)   Resp 16   LMP 06/16/2020 (Exact Date)   SpO2 98%   Abdomen: obese, mild tenderness in the bilateral lower quadrants  Extremities: no cords, tenderness erythema or edema.     A/P: 41 yo HD#2 for pain s/p methotrexate for suspected cornual pregnancy.   1. Will offer pain medication and sleep medication.   2. HCG is pending. If minimal drop will consider readministration of Methotrexate.   3. Plan to keep inpatient until tomorrow or symptoms improving significantly.   4. Will start pneumo boots due to risks of: pregnancy, obesity and immobility.     Peggy Thornton MD    "

## 2020-08-11 NOTE — PROGRESS NOTES
RECEIVING UNIT ED HANDOFF REVIEW    ED Nurse Handoff Report was reviewed by: Sierra Morales RN on August 10, 2020 at 7:20 PM

## 2020-08-11 NOTE — PLAN OF CARE
7a-3p nursing shift report  S/P etopic pregnancy - Has tolerable low pelvic dull discomfort controlled well with IV Toradol given x1. Abd obese but soft and non-tender. Passing flatus. Last BM was 8-9-20. Pt reported having had small vaginal bleed (blood clots) last previous night shift but no vag bleed reported for this day shift. . HCG levels are trending down, Hgb dropped down too to 10.7. Has fatigue but otherwise asymptomatic with this anemia.   Received IM Methotrexate today and tolerated it well. - see chemo RN's progress note.  Is up indep in room and for BRP's- Has steady gait.   Anticipate discharge home in am

## 2020-08-11 NOTE — PROGRESS NOTES
IM Methotrexate administered in vastus lateralis sites bilaterally by 2 chemo RN's. Sites WDL; covered with bandaids. Updated bedside RN. Patient instructed to notify staff if develops any symptom of an allergic response. Informed consent paperwork is on patient's hard chart.

## 2020-08-11 NOTE — PROVIDER NOTIFICATION
MD Notification    Notified Person: MD    Notified Person Name: Dr. Woods Thornton     Notification Date/Time: 8-11-20 1002    Notification Interaction: Spoke over phone    Purpose of Notification: Last transvaginal US was yesterday. Do we need another one prior to administration of methotrexate? Also, need consent signed.    Orders Received: No need for another transvaginal US. Will work on faxing consent over to clinic. Also, verified with provider that patient does not meet any of the contraindications for methotrexate administration.     Comments:

## 2020-08-11 NOTE — PLAN OF CARE
Admitted @ 2030. VSS on RA. Initially c/o stabbing/cramping in lower abd. Toradol and dilaudid given x1 each with relief. BS present. Small amount vaginal bleeding with clots noted. Good UO, UA sent. NPO overnight for possible procedure, pending pain management.

## 2020-08-11 NOTE — PROVIDER NOTIFICATION
MD Notification    Notified Person: MD    Notified Person Name: Dr. Goyal     Notification Date/Time: 8/10/20 @ 2011    Notification Interaction: paged with call back    Purpose of Notification: admission orders    Orders Received: MD to place    Comments: advised to call with any change in condition/VS

## 2020-08-12 VITALS
HEART RATE: 63 BPM | SYSTOLIC BLOOD PRESSURE: 126 MMHG | HEIGHT: 64 IN | BODY MASS INDEX: 45.72 KG/M2 | RESPIRATION RATE: 16 BRPM | OXYGEN SATURATION: 97 % | WEIGHT: 267.8 LBS | TEMPERATURE: 97.7 F | DIASTOLIC BLOOD PRESSURE: 71 MMHG

## 2020-08-12 LAB
B-HCG SERPL-ACNC: 2755 IU/L (ref 0–5)
BASOPHILS # BLD AUTO: 0 10E9/L (ref 0–0.2)
BASOPHILS NFR BLD AUTO: 0.5 %
DIFFERENTIAL METHOD BLD: ABNORMAL
EOSINOPHIL # BLD AUTO: 0.1 10E9/L (ref 0–0.7)
EOSINOPHIL NFR BLD AUTO: 1.5 %
ERYTHROCYTE [DISTWIDTH] IN BLOOD BY AUTOMATED COUNT: 14.3 % (ref 10–15)
HCT VFR BLD AUTO: 32.6 % (ref 35–47)
HGB BLD-MCNC: 10.3 G/DL (ref 11.7–15.7)
IMM GRANULOCYTES # BLD: 0 10E9/L (ref 0–0.4)
IMM GRANULOCYTES NFR BLD: 0 %
LYMPHOCYTES # BLD AUTO: 2.1 10E9/L (ref 0.8–5.3)
LYMPHOCYTES NFR BLD AUTO: 32.6 %
MCH RBC QN AUTO: 28.6 PG (ref 26.5–33)
MCHC RBC AUTO-ENTMCNC: 31.6 G/DL (ref 31.5–36.5)
MCV RBC AUTO: 91 FL (ref 78–100)
MONOCYTES # BLD AUTO: 0.2 10E9/L (ref 0–1.3)
MONOCYTES NFR BLD AUTO: 2.4 %
NEUTROPHILS # BLD AUTO: 4.1 10E9/L (ref 1.6–8.3)
NEUTROPHILS NFR BLD AUTO: 63 %
NRBC # BLD AUTO: 0 10*3/UL
NRBC BLD AUTO-RTO: 0 /100
PLATELET # BLD AUTO: 246 10E9/L (ref 150–450)
RBC # BLD AUTO: 3.6 10E12/L (ref 3.8–5.2)
WBC # BLD AUTO: 6.6 10E9/L (ref 4–11)

## 2020-08-12 PROCEDURE — 25800030 ZZH RX IP 258 OP 636: Performed by: OBSTETRICS & GYNECOLOGY

## 2020-08-12 PROCEDURE — 36415 COLL VENOUS BLD VENIPUNCTURE: CPT | Performed by: OBSTETRICS & GYNECOLOGY

## 2020-08-12 PROCEDURE — 84702 CHORIONIC GONADOTROPIN TEST: CPT | Performed by: OBSTETRICS & GYNECOLOGY

## 2020-08-12 PROCEDURE — 85025 COMPLETE CBC W/AUTO DIFF WBC: CPT | Performed by: OBSTETRICS & GYNECOLOGY

## 2020-08-12 RX ADMIN — SODIUM CHLORIDE, POTASSIUM CHLORIDE, SODIUM LACTATE AND CALCIUM CHLORIDE: 600; 310; 30; 20 INJECTION, SOLUTION INTRAVENOUS at 03:33

## 2020-08-12 ASSESSMENT — ACTIVITIES OF DAILY LIVING (ADL)
ADLS_ACUITY_SCORE: 10

## 2020-08-12 NOTE — PLAN OF CARE
AOx4. VSS on RA. Up independently, ambulating in hallway. Regular diet. PIV infusing LR @ 125 ml/hr. Pt denied pain this shift. Pt denies vaginal bleeding and clots this shift. Passing flatus. Voiding in bathroom, straw colored UOP with some sediment noted. IM Methotrexate given during day shift, HCG levels trending down, chemo precautions maintained. Plan to discharge home tomorrow morning.

## 2020-08-12 NOTE — PROGRESS NOTES
VSS Afeb  Feeling better pain improved  Abd- soft, flat, NT  Lab- Hgb 10.3, Hcg pending  Imp- Improving  Plan- Discharge home  Instructions and warnings given and stressed- increased pain,SOB,CP, dizzieness or heavy vaginal bleeding.  F/U clinic 1 week

## 2020-08-12 NOTE — PLAN OF CARE
Pt A/O. VSS on RA. C/o nausea, Zofran x1. C/o abdominal cramping, Toradol x1. PIV w/ LR at 125 ml/hr. Regular diet. Methotrexate given 8/11, Chemo precautions maintained. Up independently. Plan to discharge home today.

## 2020-08-12 NOTE — PLAN OF CARE
Pt is A and O X 4. VSS on RA. Denies pain or nausea. No prn pain meds needed. On Chemo precautions. Iv fluids discontinued. Had last dose of Methotrexated 8/11/20. HCG levels trending down.  Md placed order for discharge. Ind in the room. Lungs clear, bowel sounds biju-active.  HR wdl. Pt denies abdominal pain or cramping at time of discharge. Pt also denies nausea or weakness at time of discharge. All discharge education completed and all questions answered. Pt instructed about following up with OBGYN within 7 days post hospitalization. Pt expressed understanding.  Pt also instructed about need to contact her provider if increased in vaginal bleeding, pain, or SOB/diziness  is noted.  Pt expressed understanding. No new meds given at time of discharger per Md order. Pt reports all belongings with her at time of discharge. Pt transferred by transport NA to door number 2. Pt's father giving pt ride to home. Pt in stable conditions and vitally stable  at time of discharge.

## 2020-08-17 ENCOUNTER — PATIENT OUTREACH (OUTPATIENT)
Dept: CARE COORDINATION | Facility: CLINIC | Age: 40
End: 2020-08-17

## 2020-08-17 DIAGNOSIS — Z20.822 COVID-19 RULED OUT: Primary | ICD-10-CM

## 2020-08-18 ENCOUNTER — HOSPITAL ENCOUNTER (OUTPATIENT)
Dept: LAB | Facility: CLINIC | Age: 40
Discharge: HOME | End: 2020-08-18
Attending: OBSTETRICS & GYNECOLOGY | Admitting: OBSTETRICS & GYNECOLOGY
Payer: COMMERCIAL

## 2020-08-18 ENCOUNTER — HOSPITAL ENCOUNTER (EMERGENCY)
Facility: CLINIC | Age: 40
End: 2020-08-18
Payer: COMMERCIAL

## 2020-08-18 DIAGNOSIS — O00.90 ECTOPIC PREGNANCY: ICD-10-CM

## 2020-08-18 DIAGNOSIS — O00.90 ECTOPIC PREGNANCY: Primary | ICD-10-CM

## 2020-08-18 DIAGNOSIS — O00.80 CERVICAL PREGNANCY: ICD-10-CM

## 2020-08-18 LAB — B-HCG SERPL-ACNC: 1261 IU/L (ref 0–5)

## 2020-08-18 PROCEDURE — 84702 CHORIONIC GONADOTROPIN TEST: CPT | Performed by: OBSTETRICS & GYNECOLOGY

## 2020-08-18 PROCEDURE — 36415 COLL VENOUS BLD VENIPUNCTURE: CPT | Performed by: OBSTETRICS & GYNECOLOGY

## 2020-10-17 ENCOUNTER — HOSPITAL ENCOUNTER (INPATIENT)
Facility: CLINIC | Age: 40
LOS: 4 days | Discharge: HOME | DRG: 776 | End: 2020-10-21
Attending: PSYCHIATRY & NEUROLOGY | Admitting: PSYCHIATRY & NEUROLOGY
Payer: COMMERCIAL

## 2020-10-17 ENCOUNTER — APPOINTMENT (OUTPATIENT)
Dept: CT IMAGING | Facility: CLINIC | Age: 40
End: 2020-10-17
Attending: EMERGENCY MEDICINE
Payer: COMMERCIAL

## 2020-10-17 ENCOUNTER — HOSPITAL ENCOUNTER (EMERGENCY)
Facility: CLINIC | Age: 40
Discharge: ANOTHER HOSPITAL | End: 2020-10-17
Attending: EMERGENCY MEDICINE | Admitting: EMERGENCY MEDICINE
Payer: COMMERCIAL

## 2020-10-17 VITALS
WEIGHT: 255 LBS | HEIGHT: 65 IN | SYSTOLIC BLOOD PRESSURE: 117 MMHG | OXYGEN SATURATION: 98 % | TEMPERATURE: 96.6 F | DIASTOLIC BLOOD PRESSURE: 70 MMHG | RESPIRATION RATE: 16 BRPM | BODY MASS INDEX: 42.49 KG/M2 | HEART RATE: 53 BPM

## 2020-10-17 DIAGNOSIS — G08 CEREBRAL VENOUS THROMBOSIS: ICD-10-CM

## 2020-10-17 DIAGNOSIS — G08 CEREBRAL VENOUS THROMBOSIS OF CORTICAL VEIN: Primary | ICD-10-CM

## 2020-10-17 LAB
ANION GAP SERPL CALCULATED.3IONS-SCNC: 9 MMOL/L (ref 3–14)
APTT PPP: 25 SEC (ref 22–37)
B-HCG FREE SERPL-ACNC: <5 IU/L
BASOPHILS # BLD AUTO: 0 10E9/L (ref 0–0.2)
BASOPHILS NFR BLD AUTO: 0.4 %
BUN SERPL-MCNC: 10 MG/DL (ref 7–30)
CALCIUM SERPL-MCNC: 8.3 MG/DL (ref 8.5–10.1)
CHLORIDE SERPL-SCNC: 109 MMOL/L (ref 94–109)
CO2 SERPL-SCNC: 23 MMOL/L (ref 20–32)
CREAT SERPL-MCNC: 0.83 MG/DL (ref 0.52–1.04)
DIFFERENTIAL METHOD BLD: ABNORMAL
EOSINOPHIL # BLD AUTO: 0.1 10E9/L (ref 0–0.7)
EOSINOPHIL NFR BLD AUTO: 0.5 %
ERYTHROCYTE [DISTWIDTH] IN BLOOD BY AUTOMATED COUNT: 13.6 % (ref 10–15)
GFR SERPL CREATININE-BSD FRML MDRD: 89 ML/MIN/{1.73_M2}
GLUCOSE BLDC GLUCOMTR-MCNC: 88 MG/DL (ref 70–99)
GLUCOSE SERPL-MCNC: 104 MG/DL (ref 70–99)
HCT VFR BLD AUTO: 41.8 % (ref 35–47)
HGB BLD-MCNC: 13 G/DL (ref 11.7–15.7)
IMM GRANULOCYTES # BLD: 0 10E9/L (ref 0–0.4)
IMM GRANULOCYTES NFR BLD: 0.2 %
INR PPP: 0.98 (ref 0.86–1.14)
LYMPHOCYTES # BLD AUTO: 1.7 10E9/L (ref 0.8–5.3)
LYMPHOCYTES NFR BLD AUTO: 18.1 %
MCH RBC QN AUTO: 28.8 PG (ref 26.5–33)
MCHC RBC AUTO-ENTMCNC: 31.1 G/DL (ref 31.5–36.5)
MCV RBC AUTO: 93 FL (ref 78–100)
MONOCYTES # BLD AUTO: 0.4 10E9/L (ref 0–1.3)
MONOCYTES NFR BLD AUTO: 3.6 %
NEUTROPHILS # BLD AUTO: 7.4 10E9/L (ref 1.6–8.3)
NEUTROPHILS NFR BLD AUTO: 77.2 %
NRBC # BLD AUTO: 0 10*3/UL
NRBC BLD AUTO-RTO: 0 /100
PLATELET # BLD AUTO: 334 10E9/L (ref 150–450)
POTASSIUM SERPL-SCNC: 3.5 MMOL/L (ref 3.4–5.3)
RBC # BLD AUTO: 4.51 10E12/L (ref 3.8–5.2)
SODIUM SERPL-SCNC: 141 MMOL/L (ref 133–144)
TROPONIN I SERPL-MCNC: <0.015 UG/L (ref 0–0.04)
WBC # BLD AUTO: 9.6 10E9/L (ref 4–11)

## 2020-10-17 PROCEDURE — 250N000011 HC RX IP 250 OP 636

## 2020-10-17 PROCEDURE — 250N000011 HC RX IP 250 OP 636: Performed by: EMERGENCY MEDICINE

## 2020-10-17 PROCEDURE — 0042T CT HEAD PERFUSION WITH CONTRAST: CPT

## 2020-10-17 PROCEDURE — 70496 CT ANGIOGRAPHY HEAD: CPT

## 2020-10-17 PROCEDURE — 999N001017 HC STATISTIC GLUCOSE BY METER IP

## 2020-10-17 PROCEDURE — 96366 THER/PROPH/DIAG IV INF ADDON: CPT

## 2020-10-17 PROCEDURE — 85610 PROTHROMBIN TIME: CPT | Performed by: EMERGENCY MEDICINE

## 2020-10-17 PROCEDURE — 84484 ASSAY OF TROPONIN QUANT: CPT | Performed by: EMERGENCY MEDICINE

## 2020-10-17 PROCEDURE — U0003 INFECTIOUS AGENT DETECTION BY NUCLEIC ACID (DNA OR RNA); SEVERE ACUTE RESPIRATORY SYNDROME CORONAVIRUS 2 (SARS-COV-2) (CORONAVIRUS DISEASE [COVID-19]), AMPLIFIED PROBE TECHNIQUE, MAKING USE OF HIGH THROUGHPUT TECHNOLOGIES AS DESCRIBED BY CMS-2020-01-R: HCPCS | Performed by: EMERGENCY MEDICINE

## 2020-10-17 PROCEDURE — 70450 CT HEAD/BRAIN W/O DYE: CPT

## 2020-10-17 PROCEDURE — 80048 BASIC METABOLIC PNL TOTAL CA: CPT | Performed by: EMERGENCY MEDICINE

## 2020-10-17 PROCEDURE — 200N000002 HC R&B ICU UMMC

## 2020-10-17 PROCEDURE — 96376 TX/PRO/DX INJ SAME DRUG ADON: CPT

## 2020-10-17 PROCEDURE — 96375 TX/PRO/DX INJ NEW DRUG ADDON: CPT

## 2020-10-17 PROCEDURE — 85730 THROMBOPLASTIN TIME PARTIAL: CPT | Performed by: EMERGENCY MEDICINE

## 2020-10-17 PROCEDURE — 99285 EMERGENCY DEPT VISIT HI MDM: CPT | Mod: 25

## 2020-10-17 PROCEDURE — C9803 HOPD COVID-19 SPEC COLLECT: HCPCS

## 2020-10-17 PROCEDURE — 85025 COMPLETE CBC W/AUTO DIFF WBC: CPT | Performed by: EMERGENCY MEDICINE

## 2020-10-17 PROCEDURE — 84702 CHORIONIC GONADOTROPIN TEST: CPT

## 2020-10-17 PROCEDURE — 96365 THER/PROPH/DIAG IV INF INIT: CPT

## 2020-10-17 RX ORDER — POTASSIUM CL/LIDO/0.9 % NACL 10MEQ/0.1L
10 INTRAVENOUS SOLUTION, PIGGYBACK (ML) INTRAVENOUS
Status: DISCONTINUED | OUTPATIENT
Start: 2020-10-17 | End: 2020-10-21 | Stop reason: HOSPADM

## 2020-10-17 RX ORDER — MAGNESIUM SULFATE HEPTAHYDRATE 40 MG/ML
2 INJECTION, SOLUTION INTRAVENOUS
Status: COMPLETED | OUTPATIENT
Start: 2020-10-17 | End: 2020-10-18

## 2020-10-17 RX ORDER — POTASSIUM CHLORIDE 1.5 G/1.58G
20-40 POWDER, FOR SOLUTION ORAL
Status: DISCONTINUED | OUTPATIENT
Start: 2020-10-17 | End: 2020-10-21 | Stop reason: HOSPADM

## 2020-10-17 RX ORDER — ONDANSETRON 2 MG/ML
INJECTION INTRAMUSCULAR; INTRAVENOUS
Status: COMPLETED
Start: 2020-10-17 | End: 2020-10-17

## 2020-10-17 RX ORDER — NALOXONE HYDROCHLORIDE 0.4 MG/ML
.1-.4 INJECTION, SOLUTION INTRAMUSCULAR; INTRAVENOUS; SUBCUTANEOUS
Status: DISCONTINUED | OUTPATIENT
Start: 2020-10-17 | End: 2020-10-21 | Stop reason: HOSPADM

## 2020-10-17 RX ORDER — POTASSIUM CHLORIDE 750 MG/1
20-40 TABLET, EXTENDED RELEASE ORAL
Status: DISCONTINUED | OUTPATIENT
Start: 2020-10-17 | End: 2020-10-21 | Stop reason: HOSPADM

## 2020-10-17 RX ORDER — SODIUM CHLORIDE 9 MG/ML
INJECTION, SOLUTION INTRAVENOUS CONTINUOUS
Status: DISCONTINUED | OUTPATIENT
Start: 2020-10-18 | End: 2020-10-19

## 2020-10-17 RX ORDER — HYDROMORPHONE HYDROCHLORIDE 1 MG/ML
0.5 INJECTION, SOLUTION INTRAMUSCULAR; INTRAVENOUS; SUBCUTANEOUS ONCE
Status: COMPLETED | OUTPATIENT
Start: 2020-10-17 | End: 2020-10-17

## 2020-10-17 RX ORDER — LABETALOL 20 MG/4 ML (5 MG/ML) INTRAVENOUS SYRINGE
10 EVERY 10 MIN PRN
Status: DISCONTINUED | OUTPATIENT
Start: 2020-10-17 | End: 2020-10-18

## 2020-10-17 RX ORDER — HEPARIN SODIUM 10000 [USP'U]/100ML
0-3500 INJECTION, SOLUTION INTRAVENOUS CONTINUOUS
Status: DISCONTINUED | OUTPATIENT
Start: 2020-10-18 | End: 2020-10-19

## 2020-10-17 RX ORDER — POTASSIUM CHLORIDE 7.45 MG/ML
10 INJECTION INTRAVENOUS
Status: DISCONTINUED | OUTPATIENT
Start: 2020-10-17 | End: 2020-10-21 | Stop reason: HOSPADM

## 2020-10-17 RX ORDER — AMOXICILLIN 250 MG
1-2 CAPSULE ORAL 2 TIMES DAILY
Status: DISCONTINUED | OUTPATIENT
Start: 2020-10-18 | End: 2020-10-21 | Stop reason: HOSPADM

## 2020-10-17 RX ORDER — DIPHENHYDRAMINE HYDROCHLORIDE 50 MG/ML
50 INJECTION INTRAMUSCULAR; INTRAVENOUS EVERY 6 HOURS PRN
Status: DISCONTINUED | OUTPATIENT
Start: 2020-10-17 | End: 2020-10-18

## 2020-10-17 RX ORDER — IOPAMIDOL 755 MG/ML
120 INJECTION, SOLUTION INTRAVASCULAR ONCE
Status: COMPLETED | OUTPATIENT
Start: 2020-10-17 | End: 2020-10-17

## 2020-10-17 RX ORDER — ONDANSETRON 2 MG/ML
4 INJECTION INTRAMUSCULAR; INTRAVENOUS ONCE
Status: COMPLETED | OUTPATIENT
Start: 2020-10-17 | End: 2020-10-17

## 2020-10-17 RX ORDER — LEVOTHYROXINE SODIUM 112 UG/1
224 TABLET ORAL
Status: DISCONTINUED | OUTPATIENT
Start: 2020-10-18 | End: 2020-10-21 | Stop reason: HOSPADM

## 2020-10-17 RX ORDER — HEPARIN SODIUM 10000 [USP'U]/100ML
1450 INJECTION, SOLUTION INTRAVENOUS CONTINUOUS
Status: DISCONTINUED | OUTPATIENT
Start: 2020-10-17 | End: 2020-10-17 | Stop reason: HOSPADM

## 2020-10-17 RX ORDER — ACETAMINOPHEN 325 MG/1
650 TABLET ORAL EVERY 4 HOURS PRN
Status: DISCONTINUED | OUTPATIENT
Start: 2020-10-17 | End: 2020-10-21 | Stop reason: HOSPADM

## 2020-10-17 RX ORDER — POTASSIUM CHLORIDE 29.8 MG/ML
20 INJECTION INTRAVENOUS
Status: DISCONTINUED | OUTPATIENT
Start: 2020-10-17 | End: 2020-10-21 | Stop reason: HOSPADM

## 2020-10-17 RX ORDER — ACETAMINOPHEN 650 MG/1
650 SUPPOSITORY RECTAL EVERY 4 HOURS PRN
Status: DISCONTINUED | OUTPATIENT
Start: 2020-10-17 | End: 2020-10-21 | Stop reason: HOSPADM

## 2020-10-17 RX ORDER — HYDROMORPHONE HYDROCHLORIDE 1 MG/ML
0.5 INJECTION, SOLUTION INTRAMUSCULAR; INTRAVENOUS; SUBCUTANEOUS
Status: COMPLETED | OUTPATIENT
Start: 2020-10-17 | End: 2020-10-17

## 2020-10-17 RX ORDER — MAGNESIUM SULFATE HEPTAHYDRATE 40 MG/ML
4 INJECTION, SOLUTION INTRAVENOUS EVERY 4 HOURS PRN
Status: DISCONTINUED | OUTPATIENT
Start: 2020-10-17 | End: 2020-10-21 | Stop reason: HOSPADM

## 2020-10-17 RX ADMIN — HYDROMORPHONE HYDROCHLORIDE 0.5 MG: 1 INJECTION, SOLUTION INTRAMUSCULAR; INTRAVENOUS; SUBCUTANEOUS at 21:52

## 2020-10-17 RX ADMIN — HYDROMORPHONE HYDROCHLORIDE 0.5 MG: 1 INJECTION, SOLUTION INTRAMUSCULAR; INTRAVENOUS; SUBCUTANEOUS at 21:10

## 2020-10-17 RX ADMIN — ONDANSETRON 4 MG: 2 INJECTION INTRAMUSCULAR; INTRAVENOUS at 21:09

## 2020-10-17 RX ADMIN — Medication 3000 UNITS: at 19:49

## 2020-10-17 RX ADMIN — HEPARIN SODIUM 1450 UNITS/HR: 10000 INJECTION, SOLUTION INTRAVENOUS at 19:49

## 2020-10-17 RX ADMIN — IOPAMIDOL 120 ML: 755 INJECTION, SOLUTION INTRAVENOUS at 18:12

## 2020-10-17 ASSESSMENT — ENCOUNTER SYMPTOMS
PHOTOPHOBIA: 1
NUMBNESS: 1
VOMITING: 1
DECREASED CONCENTRATION: 1
COUGH: 0
HEADACHES: 1
FEVER: 0
NAUSEA: 1

## 2020-10-17 ASSESSMENT — MIFFLIN-ST. JEOR: SCORE: 1837.55

## 2020-10-17 NOTE — ED TRIAGE NOTES
Pt presents for evaluation of a migraine like headache x 3 days. No hx of migraines. Right sided x 3 days. Sensitivity to light and sound. Took Nyquil last night to try and help sleep. Went to bed around midnight, woke up around 1100 and has had difficulty grasping things in the right hand and numbness in the right arm, difficulty remembering things and difficulty focusing. Vomited Wednesday due to headache. Recent ectopic pregnancy.

## 2020-10-17 NOTE — ED NOTES
[facility]    Stroke Telephone Note    I was called by Dr. Cliff Artis on 10/17/20 at 17:53 regarding patient Paulina Diana. The patient is a 40 year old female with recent history of ectopic pregnancy who presents with 3 days of progressive onset headache that has become severe and now accompanied by nausea/vomiting but no photophobia, phonophobia, nor migraine history. Last night wasn't feeling well, took Nyquil & went to bed at 12AM; woke today at 11am with numb & clumsy R hand. On exam in ER has weakness and clumsiness as well as decreased sensation in R hand and arm, otherwise exam nonfocal. Recommended CT, CTA, CTP, & CTV.  NCCT & CTV showed venous sinus thrombosis in the super sagittal, right transverse, sigmoid sinuses and upper jugular vein.    Stroke Code Data  (for stroke code without tele)  Stroke code activated 10/17/20   1753   First stroke provider response         Last known normal 10/17/20   0000   Time of discovery   (or onset of symptoms) 10/17/20   1100   Head CT read by me 10/17/20   1815   Was stroke code de-escalated?                TPA Treatment   Not given due to outside of time window (>18h from LSN).    Endovascular Treatment  Not initiated due to absence of proximal vessel occlusion    Impression  Cerebral venous sinus thrombosis with non-localizing symptoms.    Recommendations  -Initiate anticoagulation with heparin gtt: 3000IU bolus followed by regular intensity (DVT treatment) heparin gtt with Q6 aPTT/BRYON monitoring  -Recommend transfer to higher level of care, [facility] or [facility] for continuity  -MRI/MRV on arrival to [facility] for further eval of parenchymal injury    My recommendations are based on the limited information provided on the phone by Dr. Cliff Artis. They are not intended to replace the clinical judgment of Dr. Cliff Artis which should always be utilized to provide the most appropriate care to meet the unique needs of this patient.  I was not  "requested to personally see or examine the patient at this time.    Mary Barahona MD  Vascular Neurology  To page me or covering stroke neurology team member, click here: AMCOM   Choose \"On Call\" tab at top, then search dropdown box for \"Neurology Adult\", select location, press Enter, then look for stroke/neuro ICU/telestroke.         "

## 2020-10-17 NOTE — ED PROVIDER NOTES
History     Chief Complaint:  Headache      HPI   Paulina Diana is a 40 year old female who presents for the evaluation of headache. The patient reports that for the past three days she has been experiencing a persistent headache, prompting her to the ED. The patient describes that she is experiencing photophobia, difficulty grasping objects with her right hand, right arm numbness, and difficulty focusing with her headache. She notes that last night she took a Nyquil to help her sleep, but woke up at 2300 due to her pain. She states that she vomited three days ago due to nausea from her headache. The patient denies fever, cough, and other issues.  No history of migraines.     Allergies:  Ceclor      Medications:    Synthroid     Past Medical History:    Hypothyroidism  Atypical nevus  dermatofibroma of face  Morbid obesity  Ectopic pregnancy     Past Surgical History:    Dilation and curettage suction  Salpingo-oophorectomy     Family History:    History reviewed. No pertinent family history.  .    Social History:  Smoking status: Never smoker  Alcohol use: No  Drug use: No  PCP: Ismael Ribera   Marital Status:   [2]     Review of Systems   Constitutional: Negative for fever.   Eyes: Positive for photophobia.   Respiratory: Negative for cough.    Gastrointestinal: Positive for nausea and vomiting.   Neurological: Positive for numbness and headaches.   Psychiatric/Behavioral: Positive for decreased concentration.   All other systems reviewed and are negative.        Physical Exam     Patient Vitals for the past 24 hrs:   BP Temp Temp src Pulse Resp SpO2 Height Weight   10/17/20 2115 130/80 -- -- (!) 48 -- 100 % -- --   10/17/20 2110 -- -- -- 59 -- 100 % -- --   10/17/20 2105 137/76 -- -- 52 -- 100 % -- --   10/17/20 2045 116/79 -- -- 65 -- 100 % -- --   10/17/20 2030 119/70 -- -- 61 -- -- -- --   10/17/20 2015 (!) 143/82 -- -- 68 -- 100 % -- --   10/17/20 2000 (!) 143/83 -- -- 73 -- 90 % -- --   10/17/20  "1945 (!) 151/79 -- -- 54 -- 99 % -- --   10/17/20 1935 -- -- -- 68 -- 100 % -- --   10/17/20 1930 131/87 -- -- 58 -- -- -- --   10/17/20 1915 122/85 -- -- 58 -- -- -- --   10/17/20 1900 124/74 -- -- 60 -- (!) 84 % -- --   10/17/20 1740 (!) 157/117 96.6  F (35.9  C) Temporal 74 20 100 % 1.651 m (5' 5\") 115.7 kg (255 lb)      Physical Exam  GEN: alert    HEAD: atraumatic    EYES: pupils reactive, extraocular muscles intact, conjunctivae normal    ENT: TMs normal as are EACs; nares patent; normal posterior pharynx and oral mucosa    NECK: no posterior midline tenderness, no meningeal signs, trachea midline     RESPIRATORY: no tachypnea, breath sounds clear to auscultation    CVS: normal S1/S2, no murmurs/rubs/gallops    ABDOMEN: soft, nontender, no masses or organomegaly, no rebound, positive bowel sounds    MUSCULOSKELETAL: no deformities    SKIN: warm and dry, no acute rashes or ulceration, no erythema     NEURO: Alert and oriented x3, cranial nerves III through XII grossly intact.  She has intact visual fields on confrontation testing with fingers.  She does have notable mild pronator drift upon arm extension.  She also seems to have some clumsiness with finger opposition to the right hand.  He also has some difficulties with finger-nose-finger using the right hand.  Findings were rather mild in appearance in nature.  There is no lower extremity weakness noted.  She has intact sharp versus dull sensation to the right affected arm.    LYMPH: no lymphadenopathy    PSYCHE: Normal    Emergency Department Course   Imaging:  Radiographic findings were communicated with the patient who voiced understanding of the findings.  CT Head w/o Contrast  IMPRESSION:  1.  Hyperdensity corresponds to the superior sagittal sinus, and portions of the right transverse sinus and sigmoid sinus worrisome for thromboembolic occlusion of the dural venous sinuses. Consider follow-up MRI/MR venogram or CT venogram as indicated.   2.  No " evidence for acute infarction.  3.  Additional details above.  As read by Radiology.    CTV Head Neck with Contrast   IMPRESSION:    1. There is thrombosis within the dural venous sinuses at the superior sagittal sinus, right transverse sinus, sigmoid sinus, and upper jugular level.  2. No evidence for arterial stenosis, arterial occlusion, or evidence for dissection.  3. Amorphous density corresponds to the space interposed right occipital lobe and superior right cerebellar hemisphere probably representing amorphous thrombus within the region of the right transverse sinus although the possibility of hemorrhage   associated with venous infarction cannot be entirely excluded. Consider short-term follow-up 12-24 hours noncontrast head CT as indicated.  3. Additional details above.  As read by Radiology.    CT Head Perfusion w Contrast   IMPRESSION:   1. PERFUSION MAPS: Abnormal reduce perfusion characteristics correspond to the superior sagittal sinus torcula herophili and region of the right transverse sinus. This is compatible with venous sinus thrombosis. Perfusion pattern overall is nonspecific to   suggest associated atypical/venous phase infarction, MRI with diffusion-weighted imaging would have a higher sensitivity.  As read by Radiology.    National [employer] Stroke Scale  Exam Interval: Baseline   Score    Level of consciousness: (0)   Alert, keenly responsive    LOC questions: (0)   Answers both questions correctly    LOC commands: (0)   Performs both tasks correctly    Best gaze: (0)   Normal    Visual: (0)   No visual loss    Facial palsy: (0)   Normal symmetrical movements    Motor arm (left): (0)   No drift    Motor arm (right): (1)   Drift    Motor leg (left): (0)   No drift    Motor leg (right): (0)   No drift    Limb ataxia: (1)   Present in one limb    Sensory: (0)   Normal- no sensory loss    Best language: (0)   Normal- no aphasia    Dysarthria: (0)   Normal    Extinction and  inattention: (0)   No abnormality        Total Score:  2       Laboratory:  CBC: WNL (WBC 9.6, HGB 13.0, )  BMP: Glucose 104 (H), Calcium 8.3 (L), WNL (Creatinine 0.83)   Partial thromboplastin time: 25  INR: 0.98  Troponin (1752): <0.015  Glucose by meter: WNL 88    ISTAT HCG Quantitative Pregnancy POCT (1751): <5.0     Interventions:    1949, heparin infusion, 1,450 Units/hr, IV  1949, heparin loading dose bolus, 3,000 Units, IV  2109, Zofran, 4 mg, IV  2110, Dilaudid, 0.5 mg, IV  2152, Dilaudid, 0.5 mg, IV    Emergency Department Course:  Past medical records, nursing notes, and vitals reviewed.  1755: I performed an exam of the patient and obtained history, as documented above.     IV inserted and blood drawn.     The patient was sent for a head, perfusion, and head/neck CT while in the emergency department, findings above.    1817: I spoke with Radiology regarding head CT impression.     1820: [place] [facility]     1933: I rechecked the patient. Explained findings to patient.     Findings and plan explained to the Patient. Patient will be transferred to [facility] hospital via EMS. Discussed the case with Dr. Alfonso, who will admit the patient to a monitored bed for further monitoring, evaluation, and treatment.     Impression & Plan    Medical Decision Making:  Patient is a 40-year-old who 2 months ago had a tubal pregnancy and surgery for it.  This past week she developed a right-sided headache.  2 days ago it became quite severe with vomiting latter of which is now improved although she states she has nothing left in her system to vomit and has not been eating or drinking much.    It sounds like her symptoms of mild weakness to the hand with clumsiness and numbness to the right extremity have been going on for possibly up to 11 hours as her last normal time was when she went to bed at midnight.  Code stroke was called and CT with CTA was done.  While the patient was being taken  over the stroke neurologist contacted me and recommended that we do a CTV be done as well which showed large central venous thrombosis.    The patient was heparinized as discussed with the stroke neurologist and transferred to the nearest hospital with a neuro ICU bed available which was the [facility].  The patient's first request was [facility] which had a full ICU.  The patient remained stable here but required repeated boluses of narcotic analgesics for her headache.  She had no vomiting.  I spoke with Dr. Parsons from [place] who is the accepting stroke neurologist there.      Critical care time: 30 minutes    Diagnosis:    ICD-10-CM    1. Cerebral venous thrombosis  G08        Disposition:  Patient transferred to [facility].    Scribe Disclosure:  I, Cliff Latif, am serving as a scribe at 5:45 PM on 10/17/2020 to document services personally performed by Cliff Artis MD based on my observations and the provider's statements to me.      Cliff Latif  10/17/2020   [facility] EMERGENCY DEPT       Cliff Artis MD  10/18/20 0052

## 2020-10-18 ENCOUNTER — APPOINTMENT (OUTPATIENT)
Dept: MRI IMAGING | Facility: CLINIC | Age: 40
DRG: 776 | End: 2020-10-18
Attending: PSYCHIATRY & NEUROLOGY
Payer: COMMERCIAL

## 2020-10-18 LAB
ABO + RH BLD: NORMAL
ABO + RH BLD: NORMAL
ALBUMIN SERPL-MCNC: 3 G/DL (ref 3.4–5)
ALP SERPL-CCNC: 54 U/L (ref 40–150)
ALT SERPL W P-5'-P-CCNC: 23 U/L (ref 0–50)
ANION GAP SERPL CALCULATED.3IONS-SCNC: 7 MMOL/L (ref 3–14)
APTT PPP: 44 SEC (ref 22–37)
AST SERPL W P-5'-P-CCNC: 10 U/L (ref 0–45)
AT III ACT/NOR PPP CHRO: 95 % (ref 85–135)
BILIRUB DIRECT SERPL-MCNC: 0.1 MG/DL (ref 0–0.2)
BILIRUB SERPL-MCNC: 0.3 MG/DL (ref 0.2–1.3)
BLD GP AB SCN SERPL QL: NORMAL
BLOOD BANK CMNT PATIENT-IMP: NORMAL
BUN SERPL-MCNC: 10 MG/DL (ref 7–30)
CALCIUM SERPL-MCNC: 8.6 MG/DL (ref 8.5–10.1)
CHLORIDE SERPL-SCNC: 110 MMOL/L (ref 94–109)
CHOLEST SERPL-MCNC: 163 MG/DL
CO2 SERPL-SCNC: 24 MMOL/L (ref 20–32)
CREAT SERPL-MCNC: 0.83 MG/DL (ref 0.52–1.04)
ERYTHROCYTE [DISTWIDTH] IN BLOOD BY AUTOMATED COUNT: 13.5 % (ref 10–15)
ERYTHROCYTE [SEDIMENTATION RATE] IN BLOOD BY WESTERGREN METHOD: 22 MM/H (ref 0–20)
FACT V ACT/NOR PPP: 109 % (ref 60–140)
GFR SERPL CREATININE-BSD FRML MDRD: 89 ML/MIN/{1.73_M2}
GLUCOSE BLDC GLUCOMTR-MCNC: 80 MG/DL (ref 70–99)
GLUCOSE SERPL-MCNC: 80 MG/DL (ref 70–99)
HCT VFR BLD AUTO: 39.8 % (ref 35–47)
HDLC SERPL-MCNC: 53 MG/DL
HGB BLD-MCNC: 12.6 G/DL (ref 11.7–15.7)
INR PPP: 1.02 (ref 0.86–1.14)
LABORATORY COMMENT REPORT: NORMAL
LDLC SERPL CALC-MCNC: 78 MG/DL
LMWH PPP CHRO-ACNC: 0.32 IU/ML
LMWH PPP CHRO-ACNC: 0.59 IU/ML
MCH RBC QN AUTO: 28.8 PG (ref 26.5–33)
MCHC RBC AUTO-ENTMCNC: 31.7 G/DL (ref 31.5–36.5)
MCV RBC AUTO: 91 FL (ref 78–100)
MRSA DNA SPEC QL NAA+PROBE: NEGATIVE
NONHDLC SERPL-MCNC: 111 MG/DL
PLATELET # BLD AUTO: 281 10E9/L (ref 150–450)
PLATELET # BLD AUTO: 295 10E9/L (ref 150–450)
POTASSIUM SERPL-SCNC: 3.6 MMOL/L (ref 3.4–5.3)
PROT SERPL-MCNC: 7.3 G/DL (ref 6.8–8.8)
PROTHROM ACT/NOR PPP: 155 % (ref 60–140)
RBC # BLD AUTO: 4.37 10E12/L (ref 3.8–5.2)
SARS-COV-2 RNA SPEC QL NAA+PROBE: NEGATIVE
SARS-COV-2 RNA SPEC QL NAA+PROBE: NORMAL
SODIUM SERPL-SCNC: 141 MMOL/L (ref 133–144)
SPECIMEN EXP DATE BLD: NORMAL
SPECIMEN SOURCE: NORMAL
TRIGL SERPL-MCNC: 165 MG/DL
TROPONIN I SERPL-MCNC: <0.015 UG/L (ref 0–0.04)
TSH SERPL DL<=0.005 MIU/L-ACNC: 0.3 MU/L (ref 0.4–4)
WBC # BLD AUTO: 9.8 10E9/L (ref 4–11)

## 2020-10-18 PROCEDURE — 87640 STAPH A DNA AMP PROBE: CPT | Performed by: PSYCHIATRY & NEUROLOGY

## 2020-10-18 PROCEDURE — 81240 F2 GENE: CPT | Performed by: PSYCHIATRY & NEUROLOGY

## 2020-10-18 PROCEDURE — 85300 ANTITHROMBIN III ACTIVITY: CPT | Performed by: PSYCHIATRY & NEUROLOGY

## 2020-10-18 PROCEDURE — 86147 CARDIOLIPIN ANTIBODY EA IG: CPT | Performed by: PSYCHIATRY & NEUROLOGY

## 2020-10-18 PROCEDURE — 36415 COLL VENOUS BLD VENIPUNCTURE: CPT | Performed by: STUDENT IN AN ORGANIZED HEALTH CARE EDUCATION/TRAINING PROGRAM

## 2020-10-18 PROCEDURE — 85520 HEPARIN ASSAY: CPT | Performed by: PSYCHIATRY & NEUROLOGY

## 2020-10-18 PROCEDURE — 85525 HEPARIN NEUTRALIZATION: CPT | Performed by: PSYCHIATRY & NEUROLOGY

## 2020-10-18 PROCEDURE — 70546 MR ANGIOGRAPH HEAD W/O&W/DYE: CPT

## 2020-10-18 PROCEDURE — 999N000127 HC STATISTIC PERIPHERAL IV START W US GUIDANCE

## 2020-10-18 PROCEDURE — 70546 MR ANGIOGRAPH HEAD W/O&W/DYE: CPT | Mod: 26 | Performed by: RADIOLOGY

## 2020-10-18 PROCEDURE — 258N000003 HC RX IP 258 OP 636: Performed by: PSYCHIATRY & NEUROLOGY

## 2020-10-18 PROCEDURE — 250N000013 HC RX MED GY IP 250 OP 250 PS 637: Performed by: PSYCHIATRY & NEUROLOGY

## 2020-10-18 PROCEDURE — 84443 ASSAY THYROID STIM HORMONE: CPT | Performed by: PSYCHIATRY & NEUROLOGY

## 2020-10-18 PROCEDURE — G0452 MOLECULAR PATHOLOGY INTERPR: HCPCS | Performed by: PATHOLOGY

## 2020-10-18 PROCEDURE — 85613 RUSSELL VIPER VENOM DILUTED: CPT | Performed by: PSYCHIATRY & NEUROLOGY

## 2020-10-18 PROCEDURE — 86850 RBC ANTIBODY SCREEN: CPT | Performed by: PSYCHIATRY & NEUROLOGY

## 2020-10-18 PROCEDURE — A9585 GADOBUTROL INJECTION: HCPCS | Performed by: RADIOLOGY

## 2020-10-18 PROCEDURE — 85390 FIBRINOLYSINS SCREEN I&R: CPT | Mod: 26 | Performed by: PATHOLOGY

## 2020-10-18 PROCEDURE — 200N000002 HC R&B ICU UMMC

## 2020-10-18 PROCEDURE — 250N000011 HC RX IP 250 OP 636: Performed by: STUDENT IN AN ORGANIZED HEALTH CARE EDUCATION/TRAINING PROGRAM

## 2020-10-18 PROCEDURE — 85610 PROTHROMBIN TIME: CPT | Performed by: PSYCHIATRY & NEUROLOGY

## 2020-10-18 PROCEDURE — 99291 CRITICAL CARE FIRST HOUR: CPT | Mod: GC | Performed by: PSYCHIATRY & NEUROLOGY

## 2020-10-18 PROCEDURE — 86900 BLOOD TYPING SEROLOGIC ABO: CPT | Performed by: PSYCHIATRY & NEUROLOGY

## 2020-10-18 PROCEDURE — 81241 F5 GENE: CPT | Performed by: PSYCHIATRY & NEUROLOGY

## 2020-10-18 PROCEDURE — 80061 LIPID PANEL: CPT | Performed by: PSYCHIATRY & NEUROLOGY

## 2020-10-18 PROCEDURE — 255N000002 HC RX 255 OP 636: Performed by: RADIOLOGY

## 2020-10-18 PROCEDURE — 85730 THROMBOPLASTIN TIME PARTIAL: CPT | Performed by: PSYCHIATRY & NEUROLOGY

## 2020-10-18 PROCEDURE — 999N001017 HC STATISTIC GLUCOSE BY METER IP

## 2020-10-18 PROCEDURE — 93005 ELECTROCARDIOGRAM TRACING: CPT

## 2020-10-18 PROCEDURE — 85027 COMPLETE CBC AUTOMATED: CPT | Performed by: PSYCHIATRY & NEUROLOGY

## 2020-10-18 PROCEDURE — 999N001035 HC STATISTIC THROMBIN TIME NC: Performed by: PSYCHIATRY & NEUROLOGY

## 2020-10-18 PROCEDURE — 84484 ASSAY OF TROPONIN QUANT: CPT | Performed by: PSYCHIATRY & NEUROLOGY

## 2020-10-18 PROCEDURE — 36415 COLL VENOUS BLD VENIPUNCTURE: CPT | Performed by: PSYCHIATRY & NEUROLOGY

## 2020-10-18 PROCEDURE — 85049 AUTOMATED PLATELET COUNT: CPT | Performed by: PSYCHIATRY & NEUROLOGY

## 2020-10-18 PROCEDURE — 80307 DRUG TEST PRSMV CHEM ANLYZR: CPT | Performed by: STUDENT IN AN ORGANIZED HEALTH CARE EDUCATION/TRAINING PROGRAM

## 2020-10-18 PROCEDURE — 93010 ELECTROCARDIOGRAM REPORT: CPT | Performed by: INTERNAL MEDICINE

## 2020-10-18 PROCEDURE — 86146 BETA-2 GLYCOPROTEIN ANTIBODY: CPT | Performed by: STUDENT IN AN ORGANIZED HEALTH CARE EDUCATION/TRAINING PROGRAM

## 2020-10-18 PROCEDURE — 80076 HEPATIC FUNCTION PANEL: CPT | Performed by: PSYCHIATRY & NEUROLOGY

## 2020-10-18 PROCEDURE — 86901 BLOOD TYPING SEROLOGIC RH(D): CPT | Performed by: PSYCHIATRY & NEUROLOGY

## 2020-10-18 PROCEDURE — 85220 BLOOC CLOT FACTOR V TEST: CPT | Performed by: PSYCHIATRY & NEUROLOGY

## 2020-10-18 PROCEDURE — 85652 RBC SED RATE AUTOMATED: CPT | Performed by: PSYCHIATRY & NEUROLOGY

## 2020-10-18 PROCEDURE — 85303 CLOT INHIBIT PROT C ACTIVITY: CPT | Performed by: PSYCHIATRY & NEUROLOGY

## 2020-10-18 PROCEDURE — 83090 ASSAY OF HOMOCYSTEINE: CPT | Performed by: PSYCHIATRY & NEUROLOGY

## 2020-10-18 PROCEDURE — 999N000226 HC STATISTIC SLP IP EVAL DEFER

## 2020-10-18 PROCEDURE — 87641 MR-STAPH DNA AMP PROBE: CPT | Performed by: PSYCHIATRY & NEUROLOGY

## 2020-10-18 PROCEDURE — 250N000011 HC RX IP 250 OP 636: Performed by: PSYCHIATRY & NEUROLOGY

## 2020-10-18 PROCEDURE — 80048 BASIC METABOLIC PNL TOTAL CA: CPT | Performed by: PSYCHIATRY & NEUROLOGY

## 2020-10-18 PROCEDURE — 85210 CLOT FACTOR II PROTHROM SPEC: CPT | Performed by: PSYCHIATRY & NEUROLOGY

## 2020-10-18 PROCEDURE — 85306 CLOT INHIBIT PROT S FREE: CPT | Performed by: PSYCHIATRY & NEUROLOGY

## 2020-10-18 RX ORDER — ONDANSETRON 2 MG/ML
4 INJECTION INTRAMUSCULAR; INTRAVENOUS EVERY 6 HOURS PRN
Status: DISCONTINUED | OUTPATIENT
Start: 2020-10-18 | End: 2020-10-21 | Stop reason: HOSPADM

## 2020-10-18 RX ORDER — OXYCODONE HYDROCHLORIDE 5 MG/1
5 TABLET ORAL EVERY 4 HOURS PRN
Status: DISCONTINUED | OUTPATIENT
Start: 2020-10-18 | End: 2020-10-19

## 2020-10-18 RX ORDER — MAGNESIUM SULFATE 1 G/100ML
1 INJECTION INTRAVENOUS EVERY 4 HOURS PRN
Status: COMPLETED | OUTPATIENT
Start: 2020-10-18 | End: 2020-10-19

## 2020-10-18 RX ORDER — GADOBUTROL 604.72 MG/ML
0.1 INJECTION INTRAVENOUS ONCE
Status: COMPLETED | OUTPATIENT
Start: 2020-10-18 | End: 2020-10-18

## 2020-10-18 RX ORDER — HYDRALAZINE HYDROCHLORIDE 20 MG/ML
10 INJECTION INTRAMUSCULAR; INTRAVENOUS EVERY 6 HOURS PRN
Status: DISCONTINUED | OUTPATIENT
Start: 2020-10-18 | End: 2020-10-19

## 2020-10-18 RX ORDER — MAGNESIUM SULFATE 1 G/100ML
1 INJECTION INTRAVENOUS EVERY 4 HOURS PRN
Status: COMPLETED | OUTPATIENT
Start: 2020-10-18 | End: 2020-10-18

## 2020-10-18 RX ORDER — ONDANSETRON 4 MG/1
4 TABLET, FILM COATED ORAL EVERY 6 HOURS PRN
Status: DISCONTINUED | OUTPATIENT
Start: 2020-10-18 | End: 2020-10-18

## 2020-10-18 RX ADMIN — MAGNESIUM SULFATE 1 G: 1 INJECTION INTRAVENOUS at 11:59

## 2020-10-18 RX ADMIN — GADOBUTROL 10 ML: 604.72 INJECTION INTRAVENOUS at 09:23

## 2020-10-18 RX ADMIN — MAGNESIUM SULFATE IN WATER 2 G: 40 INJECTION, SOLUTION INTRAVENOUS at 01:05

## 2020-10-18 RX ADMIN — LEVOTHYROXINE SODIUM 224 MCG: 112 TABLET ORAL at 07:29

## 2020-10-18 RX ADMIN — ACETAMINOPHEN 650 MG: 325 TABLET, FILM COATED ORAL at 07:29

## 2020-10-18 RX ADMIN — ACETAMINOPHEN 650 MG: 325 TABLET, FILM COATED ORAL at 00:52

## 2020-10-18 RX ADMIN — HEPARIN SODIUM 1800 UNITS/HR: 10000 INJECTION, SOLUTION INTRAVENOUS at 20:10

## 2020-10-18 RX ADMIN — SODIUM CHLORIDE: 9 INJECTION, SOLUTION INTRAVENOUS at 18:34

## 2020-10-18 RX ADMIN — SODIUM CHLORIDE: 9 INJECTION, SOLUTION INTRAVENOUS at 01:01

## 2020-10-18 RX ADMIN — HEPARIN SODIUM 1800 UNITS/HR: 10000 INJECTION, SOLUTION INTRAVENOUS at 01:07

## 2020-10-18 RX ADMIN — DIPHENHYDRAMINE HYDROCHLORIDE 50 MG: 50 INJECTION, SOLUTION INTRAMUSCULAR; INTRAVENOUS at 00:54

## 2020-10-18 RX ADMIN — MAGNESIUM SULFATE 1 G: 1 INJECTION INTRAVENOUS at 21:20

## 2020-10-18 RX ADMIN — ACETAMINOPHEN 650 MG: 325 TABLET, FILM COATED ORAL at 15:24

## 2020-10-18 RX ADMIN — OXYCODONE HYDROCHLORIDE 5 MG: 5 TABLET ORAL at 07:29

## 2020-10-18 RX ADMIN — PROCHLORPERAZINE EDISYLATE 10 MG: 5 INJECTION INTRAMUSCULAR; INTRAVENOUS at 15:24

## 2020-10-18 RX ADMIN — OXYCODONE HYDROCHLORIDE 5 MG: 5 TABLET ORAL at 02:21

## 2020-10-18 RX ADMIN — HEPARIN SODIUM 1800 UNITS/HR: 10000 INJECTION, SOLUTION INTRAVENOUS at 08:52

## 2020-10-18 RX ADMIN — PROCHLORPERAZINE EDISYLATE 10 MG: 5 INJECTION INTRAMUSCULAR; INTRAVENOUS at 23:52

## 2020-10-18 RX ADMIN — PROCHLORPERAZINE EDISYLATE 10 MG: 5 INJECTION INTRAMUSCULAR; INTRAVENOUS at 10:40

## 2020-10-18 RX ADMIN — ACETAMINOPHEN 650 MG: 325 TABLET, FILM COATED ORAL at 20:09

## 2020-10-18 ASSESSMENT — ACTIVITIES OF DAILY LIVING (ADL)
ADLS_ACUITY_SCORE: 11
ADLS_ACUITY_SCORE: 12

## 2020-10-18 NOTE — PLAN OF CARE
"Major Shift Events: q2 neuro exam intact; AOx4, moves all extremities. States RUE \" feels asleep\" but  is intact. C/o severe headache, PRN mag, benadryl, tylenol and oxycodone given x1 with partial relief. Heparin gtt infusing at 1800, Xa therapeutic. Denies nausea.   Plan: Continue to monitor neuro status, update team with changes.  For vital signs and complete assessments, please see documentation flowsheets.     "

## 2020-10-18 NOTE — PLAN OF CARE
Major Shift Events:  MRI completed this morning. Continues to c/o R-sided headache and R hand numbness. Headache improved with prn tylenol, compazine and magnesium. No prns needed to maintain sbp <180. Up with sba to commode. NS at 75/hour. Heparin gtt at 1800 units/hour, recheck 10a in morning.   Plan: Continue q2h neuro checks. Heparin gtt. PT/OT  For vital signs and complete assessments, please see documentation flowsheets.

## 2020-10-18 NOTE — PROGRESS NOTES
Care Management Initial Consult    General Information  Assessment completed with:: (Chart Review ),    Type of CM/SW Visit: Chart Assessment           Reason for Consult: (New stroke patient/anticoagulation )  Advance Care Planning:     Did not address, no ACP docs in Epic       Communication Assessment  Patient's communication style: spoken language (English or Bilingual)  Hearing Difficulty or Deaf: no Wear Glasses or Blind: no    Cognitive  Cognitive/Neuro/Behavioral: WDL  Level of Consciousness: alert  Arousal Level: opens eyes spontaneously  Orientation: oriented x 4  Mood/Behavior: calm, cooperative, behavior appropriate to situation  Best Language: 0 - No aphasia  Speech: clear, logical    Living Environment:   People in home: spouse     Current living Arrangements: House    Family/Social Support:  Care provided by:  self  Provides care for:  self    Description of Support System:     Current Resources:   Skilled Home Care Services:  None noted  Community Resources: (TBD)  Equipment currently used at home: none  Supplies currently used at home:  None noted    Employment:  Employment Status: unknown     Financial/Environmental Concerns:     None noted       Lifestyle & Psychosocial Needs:        Socioeconomic History     Marital status:      Spouse name: Not on file     Number of children: Not on file     Years of education: Not on file     Highest education level: Not on file     Tobacco Use     Smoking status: Never Smoker     Smokeless tobacco: Never Used     Tobacco comment:  on occasion   Substance and Sexual Activity     Alcohol use: No     Alcohol/week: 0.0 standard drinks     Comment: rare     Drug use: No     Sexual activity: Yes     Partners: Male       Functional Status:  Prior to admission patient needed assistance: none noted    Mental Health Status: no concerns      Values/Beliefs:  Spiritual, Cultural Beliefs, Mandaen Practices, Values that affect care: none  noted    Additional Information:  Assessment completed via chart review as recent notes report other services held due to pt fatigue and headache. Pt has had three ectopic pregnancies and has PCOS. Lives with  in [place].    MARKO Friedman

## 2020-10-18 NOTE — PROGRESS NOTES
Admitted/transferred from: [facility] ED  Reason for admission/transfer: Stroke monitoring  2 RN skin assessment: completed by Siobhan BENDER and Kristie AGUIAR  Result of skin assessment and interventions/actions: Skin intact  Height, weight, drug calc weight: Done  Patient belongings (see Flowsheet)  MDRO education added to care plan: N/a  ?

## 2020-10-18 NOTE — PLAN OF CARE
OT4A: CX: per rounding with neuro team will hold evaluation today. Pt fatigued and with headache. Will have other imaging as well. Will check back tomorrow as able.

## 2020-10-18 NOTE — PROGRESS NOTES
[facility]     Stroke Progress Note    Interval Events  - No acute events overnight  - Patient states she has a 9/10 right-sided sharp headache with photophobia and sound/light sensitivity w/ persistent subjective R arm and hand numbness   - MRI and MRV this morning      Impression  Paulina Diana is a pleasant 40 year old right handed female with history of hypothyroidism, recent  ectopic pregnancy in August, who presented to [facility] with three days of progressive headache which awoke her from sleep, with associated nausea photophobia, nausea and vomitting , found to have extensive cerebral vein thrombosis, started on heparin gtt then was transferred to [facility] for further evaluation and treatment. Mrs. Diana is now stable and needs current ICU level monitoring.    Plan  Neuro  #Provoked Symptomatic Cerebral vein thrombosis 2/2 Recent Ectopic Pregancy  Suspect Paulina's presentation to be a provoked cerebral veins thrombosis,  secondary to prothrombotic state associated with recent ectopic pregnancy from August. Mrs. Diana denies a history of migraines, recent oral contraceptive use, hormone therapy, steroid use, recent illness, head injury, dehydration of other medical conditions aside from hypothyroidism and 2 ectopic pregnancies. She further denied family history of clotting disorder or rheumatologic conditions. Thus, her biggest risk factor for this extensive CVT found appears to be the recent ectopic pregnancy, yet hypercoagulable labs remain pending and will need completed workup in the outpatient setting  -Heparin gtt at 25,000, high intensity treatment. Plan to transition to lovenox and then bridge to warfarin once stability established. Warfarin INR 2-3. And will need close follow up, with repeat imaging in 3 months.  -Continue Hep 10A level for monitoring, at 0.32, goal: 0.30-0.70 IU/mL for high intensity dosing   -Low threshold for CT head if any change in neuro  exam. Can consider thrombectomy if neurologically worsens clinically while on heparin  -No need for seizure ppx medications without h/o of seizures  -Continue to monitor closely for increase in ICP  -Neuro checks q 2 hours, remains ICU status  -CTV and CTP  and CTH completed, reports below. Reveal sinus vein thrombosis with R transverse sinus, sigmoid and upper right jugular involvment  -MRI/MRV ordered for further evaluation of parenchymal injury   -PT/OT/SLP-Break today from rehabs  -SBP< 180, labetalol 10 mg IV and Hydralazine injection 10 mg PRN   -Normonatremia, Euglycemia and normothermia  -Avoid hypotonic fluids  -Hypercoagulable work up pending   -Protein S, cardiolipin, antithrombin III, factor 2 and 5 mutation, factor 2 and   5 assay, homocysteine, lupus ac, protein C, beta 2 glycoprotein  -Dysphagia Screen Passed screening, no dysarthria - Regular Diet with thin liquids    -UDS pending  -ESR at 22      #Headache   - tylenol 650 mg Q4hr  - magnesium 1g q4hr PRN   -Prochlorperazine 10 mg IV Q4 PRN (2 doses)  - oxycodone 5 mg q4hr PRN, communicated with nursing to try to avoid usage      Resp  Breathing comfortably on RA  - Monitor O2 saturations  - O2 goal >92%  - Supplemental oxygen PRN     CV  #Hypertriglceridemia  -Follow up for treatment in outpatient setting  #Goal systolic<180  -PRN Hydralazine and labetalol availible    #HLD  -LDL at 78  -CTM, no indication for statin at this time     Renal   Electrolytes WNL, Cr   - Daily BMP  - IVF NaCl 0.9% at 75ml/hr for now, for another 24 hr, to avoid dehydration and while she has poor PO intake      GI  #poor appetite  -Encouraged PO intake, and fluids  -Want to avoid dehydration   -IVF    #Nausea   -Ondansetron 4 mg IV q6 PRN  -Prochlorperazine 10 mg IV Q4 PRN (2 doses)           Endocrine  #H/O Hypothyroidism  - resume pta Levothyroxiine, 224 mccg  - TSH at 0.30  - Recheck TSH and T4      Heme  Hgb  plt . Hemodynamically stable.  - Daily CBC     ID  No  leukocytosis, afebrile. No ssx of infection.  - Daily CBC     Checklist  -DIET: reg diet, encouraged fluids  -LINES: 3 PIVs  -DVT: Heparin ggt  -CODE: Full CODE  -Update: plan to update family,  Erick by phone this afternoon.     The patient was discussed with stroke fellow, Dr. Burr, and Dr. Lawton, staff attending.    Soumya Vance DO, ALISIA  Neurology PGY1  P: [phone]  ______________________________________________________    Medications   Home Meds  Prior to Admission medications    Medication Sig Start Date End Date Taking? Authorizing Provider   levothyroxine (SYNTHROID/LEVOTHROID) 112 MCG tablet Take 2 tablets (224 mcg) by mouth daily 7/1/20   Ismael Ribera MD       Scheduled Meds    levothyroxine  224 mcg Oral QAM AC     senna-docusate  1-2 tablet Oral BID       Infusion Meds    HEParin 1,800 Units/hr (10/18/20 0852)     sodium chloride 75 mL/hr at 10/18/20 0700       PRN Meds  acetaminophen **OR** acetaminophen **OR** acetaminophen, heparin, hydrALAZINE, magnesium sulfate, magnesium sulfate, naloxone, ondansetron, oxyCODONE, potassium chloride, potassium chloride with lidocaine, potassium chloride, potassium chloride, potassium chloride, prochlorperazine       PHYSICAL EXAMINATION  Temp:  [96.6  F (35.9  C)-98.2  F (36.8  C)] 98.1  F (36.7  C)  Pulse:  [45-74] (P) 53  Resp:  [14-20] 16  BP: (104-157)/() (P) 116/73  SpO2:  [84 %-100 %] (P) 98 %     General:  patient lying in bed, not in any acute distress, does express that the light and noises are really bothersome, mask in place    HEENT:  normocephalic/atraumatic  Cardio:  RRR, warm and well perfused  Pulmonary:  no respiratory distress, ctab  Abdomen:  soft, non-tender, non-distended, obese  Extremities:  no edema  Skin:  intact, warm/dry      Neurologic  Mental Status:  alert, oriented to self, location, reason for admission, follows commands, speech clear and fluent, repetition normal  Cranial Nerves:   EOMI with normal  smooth pursuit, facial sensation intact and symmetric, facial movements symmetric, hearing not formally tested but intact to conversation, palate elevation symmetric and uvula midline, no dysarthria, shoulder shrug strong bilaterally, tongue protrusion midline  Motor:  normal muscle tone and bulk, no abnormal movements, able to move all limbs spontaneously, strength 5/5 throughout upper and lower extremities, no pronator drift  Sensory:  light touch sensation intact and symmetric throughout upper and lower extremities, no extinction on double simultaneous stimulation, Graphesthesia intact  Coordination:  no dysmetria on FNF         Stroke Scales  National Institutes of Health Stroke Scale  Exam Interval: October 18, 2020, 0900   Score    Level of consciousness: (0)   Alert, keenly responsive    LOC questions: (0)   Answers both questions correctly    LOC commands: (0)   Performs both tasks correctly    Best gaze: (0)   Normal    Visual: (0)   No visual loss    Facial palsy: (0)   Normal symmetrical movements    Motor arm (left): (0)   No drift    Motor arm (right): (0)   No drift    Motor leg (left): (0)   No drift    Motor leg (right): (0)   No drift    Limb ataxia: (0)   Absent    Sensory: (0)   Normal- no sensory loss    Best language: (0)   Normal- no aphasia    Dysarthria: (0)   Normal    Extinction and inattention: (0)   No abnormality        Total Score:  0     Imaging  I personally reviewed all imaging; relevant findings per HPI.     CT PERFUSION:   Abnormal reduce perfusion characteristics correspond to the superior sagittal sinus torcula herophili and region of the right transverse sinus. This is compatible with venous sinus thrombosis. Perfusion pattern overall is nonspecific to   suggest associated atypical/venous phase infarction, MRI with diffusion-weighted imaging would have a higher sensitivity.                                                                   CTV  1. There is thrombosis within the  dural venous sinuses at the superior sagittal sinus, right transverse sinus, sigmoid sinus, and upper jugular level.  2. No evidence for arterial stenosis, arterial occlusion, or evidence for dissection.  3. Amorphous density corresponds to the space interposed right occipital lobe and superior right cerebellar hemisphere probably representing amorphous thrombus within the region of the right transverse sinus although the possibility of hemorrhage   associated with venous infarction cannot be entirely excluded. Consider short-term follow-up 12-24 hours noncontrast head CT as indicated.      CTH:  1.  Hyperdensity corresponds to the superior sagittal sinus, and portions of the right transverse sinus and sigmoid sinus worrisome for thromboembolic occlusion of the dural venous sinuses. Consider follow-up MRI/MR venogram or CT venogram as indicated.   2.  No evidence for acute infarction.  3.  Additional details above.    Lab Results Data   CBC  Recent Labs   Lab 10/18/20  0538 10/18/20  0024 10/17/20  1752   WBC  --  9.8 9.6   RBC  --  4.37 4.51   HGB  --  12.6 13.0   HCT  --  39.8 41.8    295 334     Basic Metabolic Panel    Recent Labs   Lab 10/18/20  0024 10/17/20  1752    141   POTASSIUM 3.6 3.5   CHLORIDE 110* 109   CO2 24 23   BUN 10 10   CR 0.83 0.83   GLC 80 104*   CHRISTIAN 8.6 8.3*     Liver Panel  Recent Labs   Lab 10/18/20  0024   PROTTOTAL 7.3   ALBUMIN 3.0*   BILITOTAL 0.3   ALKPHOS 54   AST 10   ALT 23     INR    Recent Labs   Lab Test 10/18/20  0024 10/17/20  1752 01/28/18  0912   INR 1.02 0.98 0.93      Lipid Profile    Recent Labs   Lab Test 10/18/20  0024 12/12/19  1559   CHOL 163 185   HDL 53 62   LDL 78 111*   TRIG 165* 61     A1C  No lab results found.  Troponin I    Recent Labs   Lab 10/18/20  0024 10/17/20  1752   TROPI <0.015 <0.015

## 2020-10-18 NOTE — PLAN OF CARE
PT 4A: Per OT who was present while neuro team rounding will hold evaluation today. Pt fatigued and with headache. Will have other imaging as well. Will reschedule PT evaluation.

## 2020-10-18 NOTE — CONSULTS
Care Management Initial Consult    General Information  Assessment completed with:: (Chart Review ),    Type of CM/SW Visit: Chart Assessment           Reason for Consult: (New stroke patient/anticoagulation )  Advance Care Planning:            Communication Assessment  Patient's communication style: spoken language (English or Bilingual)  Hearing Difficulty or Deaf: no Wear Glasses or Blind: no    Cognitive  Cognitive/Neuro/Behavioral: WDL  Level of Consciousness: alert  Arousal Level: opens eyes spontaneously  Orientation: oriented x 4  Mood/Behavior: calm, cooperative, behavior appropriate to situation  Best Language: 0 - No aphasia  Speech: clear, logical    Living Environment:   People in home: spouse     Current living Arrangements: Home        Family/Social Support:  Care provided by:  Self   Provides care for:  Self            Description of Support System:  Spouse                   Current Resources:   Skilled Home Care Services:  TBD  Community Resources: (TBD)  Equipment currently used at home: none  Supplies currently used at home:  TBD    Employment:  Employment Status:          Financial/Environmental Concerns:  None           Lifestyle & Psychosocial Needs:        Socioeconomic History     Marital status:      Spouse name: Not on file     Number of children: Not on file     Years of education: Not on file     Highest education level: Not on file     Tobacco Use     Smoking status: Never Smoker     Smokeless tobacco: Never Used     Tobacco comment:  on occasion   Substance and Sexual Activity     Alcohol use: No     Alcohol/week: 0.0 standard drinks     Comment: rare     Drug use: No     Sexual activity: Yes     Partners: Male       Functional Status:  Prior to admission patient needed assistance:          Mental Health Status:  None indicated                   Values/Beliefs:  Spiritual, Cultural Beliefs, Islam Practices, Values that affect care:                 Additional  Information:  Chart reviewed/acknowledged consult. RNCC was consulted to follow this patient. Paulina is currently receiving IV anticoagulation to treat her stroke. There is already a consult for [facility] for stroke education. Needs will be determined closer to discharge.   RNCC will continue to follow for additional needs.     SARINA Hickey RN Care Coordinator    Weekend on call  RN Care Coordinator   Pager:  [phone]

## 2020-10-18 NOTE — ED NOTES
Called to give report and nurse is busy for another 30 minutes and room won't be clean for another hour.

## 2020-10-18 NOTE — H&P
[facility]       Neurology Stroke Admission    Patient Name: Paulina Diana  : 1980 MRN#: [id number]    STROKE DATA    Stroke Code:  Stroke code not activated.  Time patient seen:  10/18/2020 1210 am  Last known normal (pt's baseline):  3 days ago     TPA treatment:  Not given due to cerebral vein thrombosis .      Stroke Scales      National Institutes of Health Stroke Scale (at presentation)   NIHSS done at:  time patient seen      Score    Level of consciousness:  (0)   Alert, keenly responsive    LOC questions:  (0)   Answers both questions correctly    LOC commands:  (0)   Performs both tasks correctly    Best gaze:  (0)   Normal    Visual:  (0)   No visual loss    Facial palsy:  (0)   Normal symmetrical movements    Motor arm (left):  (0)   No drift    Motor arm (right):  (0)   No drift    Motor leg (left):  (0)   No drift    Motor leg (right):  (0)   No drift    Limb ataxia:  (0)   Absent    Sensory:  (1)   Mild to moderate sensory loss    Best language:  (0)   Normal- no aphasia    Dysarthria:  (0)   Normal    Extinction and inattention:  (0)   No abnormality        NIHSS Total Score:  1          Dysphagia Screen  Time of screening: 10/18/2020 1230 am  Screening results: Passed screening, no dysarthria - Regular Diet with thin liquids  10/18/2020 1230 am     ASSESSMENT & PLAN BY PROBLEM     Cerebral vein thrombosis      Cerebral vein thrombosis   -Heparin gtt, high intensity   -Neuro checks q 2 hours   -MRI/MRV for further evaluation of parenchymal injury   -PT/OT/SLP   -SBP< 180   -normonatremia, euglycemia and normothermia  -CT head for any change in neuro exam   -hypercoagulable work up     #Headache   -tylenol   -magnesium and benadryl prn     #Nausea   -Zofran   -IVF    During initial physical assessment, the plan of care was discussed and developed with patient.  Plan of care includes: anticoagulation treatment and ICU monitoring .    Patient was  admitted via transfer from [facility] ED.    The patient will be admitted to the Neuro Critical Care/Stroke team..     Other Medical Problems:  #Hypothyrodism- resume pta levothyroxine     Fluids/Electrolytes/Nutrition  0.9% sodium chloride @ 75 mL/hr  Avoid hypotonic fluids.    Nutrition: None      Prophylaxis            For VTE Prevention:  - heparin gtt    For Acid Suppression:  - GI prophylaxis is not indicated    Code Status  Full Code    HPI  Paulina Diana is a 40 year old female with history of hypothyroidism, recent history of ectopic pregnancy presented to [facility] with 3 days of progressive headache. Found to have cerebral vein thrombosis, started on heparin gtt then was transferred to [facility] for further evaluation.     CT head showed hyperdensities suggestive of sinus vein thrombosis, CTV revealed SSS, R transversae sinus, sigmoid sinuse and upper jugular thrombosis and questionable right veinous infarct.  CTP perfusion abnormalities do not correspond with arterial territories.     Patients stated that she stared to have persistent headache x 3 days that woke up her from sleep. She stated that she gets photophobia, nausea and she actually vomited for 3 days ago with the headache. Today woke at 11am with numb & clumsy R hand. Yesterday she took nyquil to help her sleep.      The patient denies fever, cough, and other issues.  No history of migraines. not on OCP. No family history of blood clots. No abortions apart from 2 ectopic pregnancies. Not smoker. No DM and no recent dehydration lately. No history of rheumatological illnesses.     Pertinent Past Medical/Surgical History  Past Medical History:   Diagnosis Date     Thyroid disease     hypothyroid       Past Surgical History:   Procedure Laterality Date     DILATION AND CURETTAGE SUCTION N/A 5/27/2016    Procedure: DILATION AND CURETTAGE SUCTION;  Surgeon: Natacha Goyal MD;  Location: [facility]     ENT SURGERY      septal repair     LAPAROSCOPIC EVACUATION  ECTOPIC PREGNANCY Left 1/28/2018    Procedure: LAPAROSCOPIC EVACUATION ECTOPIC PREGNANCY;  LAPAROSCOPIC SINGLE SITE EVACUATION OF ECTOPIC PREGNANCY, LEFT SALPINGECTOMY;  Surgeon: Erum Boles MD;  Location:  OR     LAPAROSCOPIC SALPINGECTOMY Left 1/28/2018    Procedure: LAPAROSCOPIC SALPINGECTOMY;;  Surgeon: Erum Boles MD;  Location:  OR     LAPAROSCOPIC SALPINGO-OOPHORECTOMY  11/2/2013    Procedure: LAPAROSCOPIC SALPINGO-OOPHORECTOMY;  Single port laparoscopic, Evacuation of hemo-peritonium and Products of Conception;  Surgeon: Peggy Thornton MD;  Location:  OR     NOSE SURGERY      age 12       Medications: I have reviewed this patient's current medications.    Allergies: All allergies reviewed and addressed.    Family History: This patient has no significant family history.    Social History: I have reviewed this patient's social history.      ROS:  The 10 point Review of Systems is negative other than noted in the HPI.     PHYSICAL EXAMINATION  Vital Signs:  B/P: Data Unavailable,  T: Data Unavailable,  P: Data Unavailable,  R: Data Unavailable    General:  patient lying in bed without any acute distress    HEENT:  normocephalic/atraumatic  Cardio:  RRR  Pulmonary:  no respiratory distress  Abdomen:  soft, non-tender  Extremities:  no edema  Skin:  intact     Neurologic  Mental Status:  fully alert, attentive and oriented, follows commands, speech clear and fluent  Cranial Nerves:  visual fields intact, PERRL, EOMI with normal smooth pursuit, facial sensation intact and symmetric, facial movements symmetric, hearing not formally tested but intact to conversation, palate elevation symmetric and uvula midline, no dysarthria, shoulder shrug strong bilaterally, tongue protrusion midline  Motor:  no abnormal movements, normal tone throughout, normal muscle bulk, no pronator drift, normal and symmetric rapid finger tapping, able to move all limbs spontaneously, strength 5/5  throughout upper and lower extremities  Reflexes:  2+ and symmetric throughout  Sensory:  decreased right hand sensation   Coordination:  FNF and HS intact without dysmetria  Station/Gait:  unable to test    Labs  Labs and Imaging reviewed and used in developing the plan; pertinent results included.     Lab Results   Component Value Date     (H) 10/17/2020       The patient was discussed with the fellow, Dr. Peng.  The staff is Dr. Alfonso.    [facility]  Neurology [email or web address]

## 2020-10-18 NOTE — PLAN OF CARE
SLP: Defer - ST orders received as part of stroke order set. Pt passed RN swallow screen, no facial droop or dysarthria. Per RN, pt has been tolerating regular diet, communicating without difficulty. SLP will defer and complete orders. Please reconsult with changes in swallow function or communication skills.

## 2020-10-19 ENCOUNTER — APPOINTMENT (OUTPATIENT)
Dept: OCCUPATIONAL THERAPY | Facility: CLINIC | Age: 40
DRG: 776 | End: 2020-10-19
Attending: PSYCHIATRY & NEUROLOGY
Payer: COMMERCIAL

## 2020-10-19 LAB
B2 GLYCOPROT1 IGG SERPL IA-ACNC: NORMAL U/ML
B2 GLYCOPROT1 IGM SERPL IA-ACNC: NORMAL U/ML
CARDIOLIPIN ANTIBODY IGG: <1.6 GPL-U/ML (ref 0–19.9)
CARDIOLIPIN ANTIBODY IGM: 1.3 MPL-U/ML (ref 0–19.9)
CARDIOLIPIN IGA SERPL-ACNC: 0.5 APL U/ML (ref 0–19.9)
ERYTHROCYTE [DISTWIDTH] IN BLOOD BY AUTOMATED COUNT: 13.2 % (ref 10–15)
HCT VFR BLD AUTO: 42.5 % (ref 35–47)
HGB BLD-MCNC: 13.2 G/DL (ref 11.7–15.7)
INTERPRETATION ECG - MUSE: NORMAL
LMWH PPP CHRO-ACNC: 0.99 IU/ML
LMWH PPP CHRO-ACNC: <0.1 IU/ML
MCH RBC QN AUTO: 28.9 PG (ref 26.5–33)
MCHC RBC AUTO-ENTMCNC: 31.1 G/DL (ref 31.5–36.5)
MCV RBC AUTO: 93 FL (ref 78–100)
PLATELET # BLD AUTO: 233 10E9/L (ref 150–450)
RBC # BLD AUTO: 4.57 10E12/L (ref 3.8–5.2)
TSH SERPL DL<=0.005 MIU/L-ACNC: 0.49 MU/L (ref 0.4–4)
WBC # BLD AUTO: 7.2 10E9/L (ref 4–11)

## 2020-10-19 PROCEDURE — 200N000002 HC R&B ICU UMMC

## 2020-10-19 PROCEDURE — 84443 ASSAY THYROID STIM HORMONE: CPT | Performed by: PSYCHIATRY & NEUROLOGY

## 2020-10-19 PROCEDURE — 86146 BETA-2 GLYCOPROTEIN ANTIBODY: CPT | Performed by: PSYCHIATRY & NEUROLOGY

## 2020-10-19 PROCEDURE — 250N000011 HC RX IP 250 OP 636: Performed by: STUDENT IN AN ORGANIZED HEALTH CARE EDUCATION/TRAINING PROGRAM

## 2020-10-19 PROCEDURE — 250N000013 HC RX MED GY IP 250 OP 250 PS 637: Performed by: PSYCHIATRY & NEUROLOGY

## 2020-10-19 PROCEDURE — 250N000013 HC RX MED GY IP 250 OP 250 PS 637: Performed by: STUDENT IN AN ORGANIZED HEALTH CARE EDUCATION/TRAINING PROGRAM

## 2020-10-19 PROCEDURE — 250N000009 HC RX 250: Performed by: NURSE PRACTITIONER

## 2020-10-19 PROCEDURE — 250N000011 HC RX IP 250 OP 636: Performed by: NURSE PRACTITIONER

## 2020-10-19 PROCEDURE — 999N000147 HC STATISTIC PT IP EVAL DEFER

## 2020-10-19 PROCEDURE — 97165 OT EVAL LOW COMPLEX 30 MIN: CPT | Mod: GO

## 2020-10-19 PROCEDURE — 97535 SELF CARE MNGMENT TRAINING: CPT | Mod: GO

## 2020-10-19 PROCEDURE — 85027 COMPLETE CBC AUTOMATED: CPT | Performed by: STUDENT IN AN ORGANIZED HEALTH CARE EDUCATION/TRAINING PROGRAM

## 2020-10-19 PROCEDURE — 36415 COLL VENOUS BLD VENIPUNCTURE: CPT | Performed by: PSYCHIATRY & NEUROLOGY

## 2020-10-19 PROCEDURE — 85520 HEPARIN ASSAY: CPT | Performed by: PSYCHIATRY & NEUROLOGY

## 2020-10-19 PROCEDURE — 36415 COLL VENOUS BLD VENIPUNCTURE: CPT | Performed by: STUDENT IN AN ORGANIZED HEALTH CARE EDUCATION/TRAINING PROGRAM

## 2020-10-19 PROCEDURE — 99223 1ST HOSP IP/OBS HIGH 75: CPT | Mod: GC | Performed by: PHYSICAL MEDICINE & REHABILITATION

## 2020-10-19 PROCEDURE — 99233 SBSQ HOSP IP/OBS HIGH 50: CPT | Mod: GC | Performed by: PSYCHIATRY & NEUROLOGY

## 2020-10-19 RX ORDER — LORAZEPAM 2 MG/ML
2 INJECTION INTRAMUSCULAR
Status: COMPLETED | OUTPATIENT
Start: 2020-10-19 | End: 2020-10-19

## 2020-10-19 RX ORDER — ACETAZOLAMIDE 125 MG/1
125 TABLET ORAL
Status: DISCONTINUED | OUTPATIENT
Start: 2020-10-19 | End: 2020-10-19

## 2020-10-19 RX ORDER — DIPHENHYDRAMINE HYDROCHLORIDE 50 MG/ML
25 INJECTION INTRAMUSCULAR; INTRAVENOUS EVERY 6 HOURS PRN
Status: DISCONTINUED | OUTPATIENT
Start: 2020-10-19 | End: 2020-10-21 | Stop reason: HOSPADM

## 2020-10-19 RX ORDER — MAGNESIUM SULFATE 1 G/100ML
1 INJECTION INTRAVENOUS EVERY 4 HOURS PRN
Status: DISCONTINUED | OUTPATIENT
Start: 2020-10-19 | End: 2020-10-21 | Stop reason: HOSPADM

## 2020-10-19 RX ORDER — SODIUM CHLORIDE, SODIUM GLUCONATE, SODIUM ACETATE, POTASSIUM CHLORIDE AND MAGNESIUM CHLORIDE 526; 502; 368; 37; 30 MG/100ML; MG/100ML; MG/100ML; MG/100ML; MG/100ML
125 INJECTION, SOLUTION INTRAVENOUS CONTINUOUS
Status: DISCONTINUED | OUTPATIENT
Start: 2020-10-19 | End: 2020-10-21

## 2020-10-19 RX ORDER — ACETAZOLAMIDE 125 MG/1
125 TABLET ORAL
Status: CANCELLED | OUTPATIENT
Start: 2020-10-19

## 2020-10-19 RX ORDER — DIPHENHYDRAMINE HYDROCHLORIDE 50 MG/ML
25 INJECTION INTRAMUSCULAR; INTRAVENOUS EVERY 6 HOURS PRN
Status: CANCELLED | OUTPATIENT
Start: 2020-10-19

## 2020-10-19 RX ORDER — SODIUM CHLORIDE, SODIUM GLUCONATE, SODIUM ACETATE, POTASSIUM CHLORIDE AND MAGNESIUM CHLORIDE 526; 502; 368; 37; 30 MG/100ML; MG/100ML; MG/100ML; MG/100ML; MG/100ML
125 INJECTION, SOLUTION INTRAVENOUS CONTINUOUS
Status: CANCELLED | OUTPATIENT
Start: 2020-10-19

## 2020-10-19 RX ORDER — HEPARIN SODIUM 10000 [USP'U]/100ML
0-3500 INJECTION, SOLUTION INTRAVENOUS CONTINUOUS
Status: DISPENSED | OUTPATIENT
Start: 2020-10-19 | End: 2020-10-20

## 2020-10-19 RX ORDER — ACETAZOLAMIDE 250 MG/1
500 TABLET ORAL 2 TIMES DAILY
Status: DISCONTINUED | OUTPATIENT
Start: 2020-10-19 | End: 2020-10-21 | Stop reason: HOSPADM

## 2020-10-19 RX ADMIN — MAGNESIUM SULFATE 1 G: 1 INJECTION INTRAVENOUS at 07:01

## 2020-10-19 RX ADMIN — LORAZEPAM 2 MG: 2 INJECTION INTRAMUSCULAR; INTRAVENOUS at 15:07

## 2020-10-19 RX ADMIN — PROCHLORPERAZINE EDISYLATE 10 MG: 5 INJECTION INTRAMUSCULAR; INTRAVENOUS at 04:34

## 2020-10-19 RX ADMIN — ACETAMINOPHEN 650 MG: 325 TABLET, FILM COATED ORAL at 04:33

## 2020-10-19 RX ADMIN — MAGNESIUM SULFATE 1 G: 1 INJECTION INTRAVENOUS at 14:40

## 2020-10-19 RX ADMIN — SODIUM CHLORIDE, SODIUM GLUCONATE, SODIUM ACETATE, POTASSIUM CHLORIDE AND MAGNESIUM CHLORIDE 125 ML/HR: 526; 502; 368; 37; 30 INJECTION, SOLUTION INTRAVENOUS at 20:26

## 2020-10-19 RX ADMIN — MAGNESIUM SULFATE 1 G: 1 INJECTION INTRAVENOUS at 02:12

## 2020-10-19 RX ADMIN — ACETAMINOPHEN 650 MG: 325 TABLET, FILM COATED ORAL at 08:55

## 2020-10-19 RX ADMIN — ACETAMINOPHEN 650 MG: 325 TABLET, FILM COATED ORAL at 14:44

## 2020-10-19 RX ADMIN — PROCHLORPERAZINE EDISYLATE 10 MG: 5 INJECTION INTRAMUSCULAR; INTRAVENOUS at 13:38

## 2020-10-19 RX ADMIN — SODIUM CHLORIDE, SODIUM GLUCONATE, SODIUM ACETATE, POTASSIUM CHLORIDE AND MAGNESIUM CHLORIDE 125 ML/HR: 526; 502; 368; 37; 30 INJECTION, SOLUTION INTRAVENOUS at 11:31

## 2020-10-19 RX ADMIN — ACETAZOLAMIDE 500 MG: 250 TABLET ORAL at 20:35

## 2020-10-19 RX ADMIN — HEPARIN SODIUM 1200 UNITS/HR: 10000 INJECTION, SOLUTION INTRAVENOUS at 17:07

## 2020-10-19 RX ADMIN — ACETAZOLAMIDE 500 MG: 250 TABLET ORAL at 13:10

## 2020-10-19 RX ADMIN — LEVOTHYROXINE SODIUM 224 MCG: 112 TABLET ORAL at 06:39

## 2020-10-19 ASSESSMENT — ACTIVITIES OF DAILY LIVING (ADL)
ADLS_ACUITY_SCORE: 12
PREVIOUS_RESPONSIBILITIES: MEAL PREP;HOUSEKEEPING;LAUNDRY;SHOPPING;YARDWORK;MEDICATION MANAGEMENT;FINANCES;DRIVING;WORK
ADLS_ACUITY_SCORE: 12
IADL_COMMENTS: PT AND SPOUSE SHARE IADL RESPONSIBILITIES

## 2020-10-19 NOTE — PROGRESS NOTES
[facility]  Neurocritical Care Progress Note    Patient Name:  Paulina Diana  MRN:  [id number]    :  1980  Primary care provider:  Ismael Ribera  Date of Admission: 10/17/2020  Date of Service:  2020    Chief Complaint:  No chief complaint on file.    Patient Summary:  Paulina Diana is a 39 yo F with PMH hypothyroidism and recent ectopic pregnancy in August who presented to [facility] with 3 days of progressive headache and was found to have cerebral vein thrombosis.     Subjective:  No acute events overnight. Pt endorses improved headache that is managable with acetaminophen. She feels pressure behind her right eye. Appetite is improved and pt denies any nausea/vomiting. No change in vision. Has pressure when looking to left or looking to right. Also still has numbness in right hand.    ROS: See HPI, review of systems otherwise negative.    Objective:  Medications:  Current Facility-Administered Medications   Medication     acetaminophen (TYLENOL) tablet 650 mg    Or     acetaminophen (TYLENOL) solution 650 mg    Or     acetaminophen (TYLENOL) Suppository 650 mg     heparin  drip 25,000 units in 0.45% NaCl 250 mL  ANTICOAGULANT  (see additional administration details for dose)     heparin bolus from infusion pump     hydrALAZINE (APRESOLINE) injection 10 mg     levothyroxine (SYNTHROID/LEVOTHROID) tablet 224 mcg     magnesium sulfate 4 g in 100 mL sterile water (premade)     naloxone (NARCAN) injection 0.1-0.4 mg     ondansetron (ZOFRAN) injection 4 mg     oxyCODONE (ROXICODONE) tablet 5 mg     potassium chloride (KLOR-CON) Packet 20-40 mEq     potassium chloride 10 mEq in 100 mL intermittent infusion with 10 mg lidocaine     potassium chloride 10 mEq in 100 mL sterile water intermittent infusion (premix)     potassium chloride 20 mEq in 50 mL intermittent infusion     potassium chloride ER (KLOR-CON M) CR tablet 20-40 mEq     senna-docusate  (SENOKOT-S/PERICOLACE) 8.6-50 MG per tablet 1-2 tablet     sodium chloride 0.9% infusion     Vitals:  Temp: 98.1  F (36.7  C) Temp  Min: 97.5  F (36.4  C)  Max: 98.4  F (36.9  C)  Resp: 14 Resp  Min: 11  Max: 28  SpO2: 98 % SpO2  Min: 96 %  Max: 100 %  Pulse: 65 Pulse  Min: 49  Max: 91    No data recorded  BP: 128/81 Systolic (24hrs), Av , Min:105 , Max:151   Diastolic (24hrs), Av, Min:61, Max:88    Intake/Output Last 24Hrs :     Intake/Output Summary (Last 24 hours) at 10/19/2020 1302  Last data filed at 10/19/2020 1200  Gross per 24 hour   Intake 2998.92 ml   Output 1305 ml   Net 1693.92 ml     Infusions:     HEParin 1,800 Units/hr (10/19/20 0600)     sodium chloride 75 mL/hr at 10/18/20 1834     Physical Exam:  Constitutional:  Alert, laying in bed, no acute distress.  ENT: Moist mucous membranes.   Cardiovascular: Warm and appears well-perfused.  Respiratory: No increased work of breathing. No accessory muscle use.  Musculoskeletal: No lower extremity swelling.    Neurologic Exam:   Mental Status: Alert and oriented to self, location, and month/year. Responds appropriately to questions.  Cranial Nerves: Pupils 3-4mm, equal, round, and briskly reactive to light. EOMI, no nystagmus or diplopia. Face symmetric, no droop appreciated. Equal palate elevation, uvula midline. Tongue protrudes without deviation.  Motor: 5/5 strength in upper and lower extremities. No drift in UEs after 10sec or LEs after 5sec.   Coordination: Finger-to-nose and heel-to-shin intact without dysmetria/ataxia.   Reflexes: 2+ brachioradialis and patellar reflexes bilaterally.  Sensory: intact sensation to light touch.   Gait: No ataxia.     Labs/Imaging:  Recent Labs   Lab 10/18/20  0538 10/18/20  0024 10/17/20  1752   WBC  --  9.8 9.6   HGB  --  12.6 13.0   MCV  --  91 93    295 334   INR  --  1.02 0.98   NA  --  141 141   POTASSIUM  --  3.6 3.5   CHLORIDE  --  110* 109   CO2  --  24 23   BUN  --  10 10   CR  --  0.83 0.83    ANIONGAP  --  7 9   CHRISTIAN  --  8.6 8.3*   GLC  --  80 104*   ALBUMIN  --  3.0*  --    PROTTOTAL  --  7.3  --    BILITOTAL  --  0.3  --    ALKPHOS  --  54  --    ALT  --  23  --    AST  --  10  --    TROPI  --  <0.015 <0.015     Assessment and Plan:  Paulina Diana is a 41 yo F with PMH hypothyroidism and recent ectopic pregnancy in August who presented to [facility] ([place], [place]) with 3 days of progressive headache and was found to have cerebral vein thrombosis.     Neuro:  #Provoked symptomatic cerebral vein thrombosis 2/2 recent ectopic pregnancy   MRI/MRV showed total occlusion of the superior sagittal sinus, right transverse sinus, right sigmoid sinus, and proximal right internal jugular vein. Suspect due to prothrombotic state from recent ectopic pregnancy. Plan to transition to lovenox as bridge to warfarin once stable. Warfarin INR goal 2-3. Will need repeat imaging in 3 months. Hep 10A level 0.99 (10/19). Hold heparin gtt for LP.   - Heparin gtt at 17,000 due to higher hep10A. Hep10A level goal 0.3-0.7 (hold for LP)  - Neuro checks q2hrs  - PT/OT, appreciate recs  - If any change in neuro exam, obtain head CT.   - Transfer to floor bed after LP (plan for tomorrow 10/20)    Hypercoagulable workup:   - Protein C: pending  - Protein S: pending  - Cardiolipin: pending  - Antithrombin III: 95  - Factor 2 and 5 mutation: pending  - Factor 2 and 5 assay: Factor 2 - 155, Factor 5 - 109  - Beta 2 glycoprotein: pending  - Lupus anticoagulant: pending  - Homocysteine: pending    #Leptomeningeal enhancement on MRI  MRI showed scattered leptomeningeal enhancement and associated diffusion restriction throughout bilateral cerebral hemispheres. Differential includes: infection, metastasis, and autoimmune disease.   - lumbar puncture tomorrow with IR  - CSF labs: Cell count + diff, total protein, glucose, cytology, aerobic culture, oligoclonal bands, lyme disease, EBV, HSV, ACE    #Headache  - Acetaminophen 650 q4h  - Mg 1g q4hrs prn  -  Prochlorperazine 10mg IV q6h  - Acetazolamide 500mg BID  - Benedryl 25mg IV if develops dystonia    Cardiovascular:  #Hypertriglyceridemia  #Hyperlipidemia  LDL 78. TGs 165.  - Follow up with primary care provider    #Hypertension   - Continue monitoring    Respiratory:  #No acute issues    Gastrointestinal:  #Poor appetite - improving  #Nausea - improving  Pt no longer has nausea and has increased appetite.   - Encourage PO intake and fluids  - Avoid dehydration  - Bowel regimen (encourage not straining; last BMP 10/17)    Renal:  No acute issues.    IVF: increase to 125ml/hr plasmalyte     Endocrine:  #Hypothyroidism  Repeat TSH 0.49.   - PTA levothyroxine    Heme:  No acute issues.    ID:  T max Temp (24hrs), Av.1  F (36.7  C), Min:97.5  F (36.4  C), Max:98.4  F (36.9  C)  No acute issues.    Checklist:  FEN: regular, encourage fluids; 125 mL/hr plasmalyte IVF  Ppx:  DVT (heparin gtt - hold for LP), SCDs  Lines: 3 PIVs  Code Status: full    Dispo: transfer to floor after LP    Patient seen and discussed with attending neurologist Dr. Braden Raymond.    Hansa Swain, MS4    Resident/Fellow Attestation   I, Iwona Bowen, was present with the medical student who participated in the service and in the documentation of the note.  I have verified the history and personally performed the physical exam and medical decision making.  I agree with the assessment and plan of care as documented in the note.      Iwona Bowen MD  PGY-2  Date of Service (when I saw the patient): 10/19/20

## 2020-10-19 NOTE — CONSULTS
[facility]   PM&R CONSULT    Consulting Provider: Umer  Reason for Consult: Assessment of rehabilitation needs  Location of Patient: 4AB  Date of Encounter: 10/19/2020     ASSESSMENT/PLAN:    Ms. Paulina Diana is a righ handed 40 year old year-old female who presents with ongoing subjective numbness in right hand, improving. Was found to have cerebral vein thrombosis. Overall, she is doing better than at admit, with just minor residual numbness at this time. Her pathology does not appear to be affecting, strength, cognition, or other systemic areas. She is most appropriate to discharge home once medical workup complete. Would continue to monitor for impact on her daily living, however it appears that she will ultimately not have any lasting restrictions. With regards to her work, would recommend taking 1 week off, then returning part-time for one week to evaluate for any potential issues, before returning full time    - F/U Dr. Min of PM&R in 4 weeks. If patient returned to baseline, can cancel this appointment.    PM&R will sign off at this time. Please reach back out should new issues/question arise.    HISTORY OF PRESENT ILLNESS:   Ms. Diana is a 40-year-old female with PMH significant for hypothyroidism and recent ectopic pregnancy who presented to [facility] on 10/17 with 3 days of progressive headache.  She was found to have cerebral vein thrombosis on CT/CTV, started on heparin drip, and transferred to [facility].  At the time she reported symptoms of photophobia, nausea, and emesis on initial day of onset.  On day of presentation she also noted numbness and clumsiness in her right hand.  She was evaluated by neurology who felt this is a provoked cerebral vein thrombus secondary to the prothrombotic state associated with a recent ectopic pregnancy in August.  Hypercoagulable work-up is pending.    Today, she notes that her numbness is improving. Otherwise without  concerns. Denies any cognitive impact. No weakness in arm. Legs not affected; she has been ambulating around room independently. No B/B issues.    PREVIOUS LEVEL OF FUNCTION:  Patient was previously independent with ADL and iADLs.  Was driving    CURRENT FUNCTION:   PT: Per PT note 10/19, has no skilled inpatient PT needs, so evaluation deferred.    OT: Improving numbness and tingling in right hand noted, decreased activity tolerance and independence noted on initial exam, currently SBA for transfers and ambulation.  Recommending home with assist at this time.    SLP: Patient passed nurse bedside swallow screen, speech has deferred additional evaluation at this time.    CURRENT RN NEEDS: Close neurologic monitoring, pain management, nausea management, vitals monitoring, anticoagulation management, bowel/bladder monitoring    SOCIAL HISTORY(Home Setting/Support):  Social History     Socioeconomic History     Marital status:      Spouse name: Not on file     Number of children: Not on file     Years of education: Not on file     Highest education level: Not on file   Occupational History     Not on file   Social Needs     Financial resource strain: Not on file     Food insecurity     Worry: Not on file     Inability: Not on file     Transportation needs     Medical: Not on file     Non-medical: Not on file   Tobacco Use     Smoking status: Never Smoker     Smokeless tobacco: Never Used     Tobacco comment:  on occasion   Substance and Sexual Activity     Alcohol use: No     Alcohol/week: 0.0 standard drinks     Comment: rare     Drug use: No     Sexual activity: Yes     Partners: Male   Lifestyle     Physical activity     Days per week: Not on file     Minutes per session: Not on file     Stress: Not on file   Relationships     Social connections     Talks on phone: Not on file     Gets together: Not on file     Attends Worship service: Not on file     Active member of club or organization: Not on file      Attends meetings of clubs or organizations: Not on file     Relationship status: Not on file     Intimate partner violence     Fear of current or ex partner: Not on file     Emotionally abused: Not on file     Physically abused: Not on file     Forced sexual activity: Not on file   Other Topics Concern     Not on file   Social History Narrative     Not on file   Lives with  in a house.  5 steps to enter.    PAST MEDICAL HISTORY:  Past Medical History:   Diagnosis Date     Thyroid disease     hypothyroid     CURRENT MEDICATIONS:  Current Facility-Administered Medications   Medication     acetaminophen (TYLENOL) tablet 650 mg    Or     acetaminophen (TYLENOL) solution 650 mg    Or     acetaminophen (TYLENOL) Suppository 650 mg     acetaZOLAMIDE (DIAMOX) tablet 500 mg     levothyroxine (SYNTHROID/LEVOTHROID) tablet 224 mcg     magnesium sulfate 1 gm in 100mL D5W intermittent infusion     magnesium sulfate 4 g in 100 mL sterile water (premade)     naloxone (NARCAN) injection 0.1-0.4 mg     ondansetron (ZOFRAN) injection 4 mg     Plasma-Lyte A (electrolyte A) solution     potassium chloride (KLOR-CON) Packet 20-40 mEq     potassium chloride 10 mEq in 100 mL intermittent infusion with 10 mg lidocaine     potassium chloride 10 mEq in 100 mL sterile water intermittent infusion (premix)     potassium chloride 20 mEq in 50 mL intermittent infusion     potassium chloride ER (KLOR-CON M) CR tablet 20-40 mEq     prochlorperazine (COMPAZINE) injection 10 mg     senna-docusate (SENOKOT-S/PERICOLACE) 8.6-50 MG per tablet 1-2 tablet     REVIEW OF SYSTEMS:   Total of ten systems were reviewed with patient, negative except as noted in HPI.    PHYSICAL EXAM:   VS: /80   Pulse 66   Temp 98.3  F (36.8  C) (Oral)   Resp 16   Wt 115.9 kg (255 lb 8.2 oz)   LMP 06/16/2020 (Exact Date)   SpO2 100%   BMI 42.52 kg/m    GEN: In no acute distress, lying comfortably in bed, alert, appropriate, cooperative  HEENT:  Normocephalic, atraumatic  RESPIRATORY: Nonlabored breathing on RA  CARDIAC: RRR, no m/r/g  MSK: full active and passive ROM at all major joints of the bilaterally upper and lower extremities  No muscle atrophy noted  ABD: soft, non tender, pos BS  NEURO:  CNs: II-XII grossly intact  Sensation: Sensation to light touch intact throughout, especially in all dermatomes of b/l upper extremities  Strength: 5/5 to bilateral , intrinsics, EF, EE, shoulder abduction. Grossly 5/5 bilateral legs  Cognition: fund of knowledge and train of thought appropriate  Speech: fluent  SKIN: no rashes or lesions noted.  EXT: No lower extremity edema bilaterally.   PSYCH: Normal affect.    LABS:    Lab Results   Component Value Date    WBC 9.8 10/18/2020    HGB 12.6 10/18/2020    HCT 39.8 10/18/2020    MCV 91 10/18/2020     10/18/2020     Lab Results   Component Value Date     10/18/2020    POTASSIUM 3.6 10/18/2020    CHLORIDE 110 (H) 10/18/2020    CO2 24 10/18/2020    GLC 80 10/18/2020     Lab Results   Component Value Date    GFRESTIMATED 89 10/18/2020    GFRESTBLACK >90 10/18/2020     Lab Results   Component Value Date    AST 10 10/18/2020    ALT 23 10/18/2020    ALKPHOS 54 10/18/2020    BILITOTAL 0.3 10/18/2020     Lab Results   Component Value Date    INR 1.02 10/18/2020     Lab Results   Component Value Date    BUN 10 10/18/2020    CR 0.83 10/18/2020     IMAGING:  MRV Brain, 10/18/20:  Impression:  1, No diffusion restriction to suggest venous infarct. Scattered  leptomeningeal enhancement and associated diffusion restriction  throughout the bilateral cerebral hemispheres, likely represents  cortical venous congestion.   2. Total occlusion of the superior sagittal sinus, right transverse  sinus, right sigmoid sinus, and proximal part of the right internal  jugular vein.     CTV, 10/17/20:  IMPRESSION:       1. There is thrombosis within the dural venous sinuses at the superior sagittal sinus, right transverse sinus,  sigmoid sinus, and upper jugular level.     2. No evidence for arterial stenosis, arterial occlusion, or evidence for dissection.     3. Amorphous density corresponds to the space interposed right occipital lobe and superior right cerebellar hemisphere probably representing amorphous thrombus within the region of the right transverse sinus although the possibility of hemorrhage   associated with venous infarction cannot be entirely excluded. Consider short-term follow-up 12-24 hours noncontrast head CT as indicated.     3. Additional details above.    CT Head, 10/17/20:  IMPRESSION:  1.  Hyperdensity corresponds to the superior sagittal sinus, and portions of the right transverse sinus and sigmoid sinus worrisome for thromboembolic occlusion of the dural venous sinuses. Consider follow-up MRI/MR venogram or CT venogram as indicated.      2.  No evidence for acute infarction.     3.  Additional details above.      Thank you for this consultation. Please do not hesitate to call with any questions.     Patient staffed with Dr. Pako Hernandez MD   Rehabilitation Physician  Pager: [phone]    For questions regarding ARU, please contact Silva Lester or Liliam [phone]

## 2020-10-19 NOTE — PLAN OF CARE
Nursing Progress Note    D/I/A:  Neuro: Q2 neuro checks. No neuro changes during shift; remains intact. Headaches and R hand numbness improving per patient. PRN tylenol, compazine, and magnesium given for pain with adequate results. Ambulating well SBA.   CV: NSR 60s-70s. Intermittent bradycardia when asleep; intermittent tachycardia when anxious. SBP goal less than 180; no prns have been needed.   Pulm: RA; clear LS  GI/: Appetite starting to improve. No nausea. Had a few bowel movements today. Voiding adequately.   Drips: heparin drip stopped at 0945 for high Xa level; remained off until after lumbar puncture. Restarted at 1700; running 1200 units/hr.    P:  Stroke class tomorrow at 0900. LP in IR tomorrow. Continue neuro checks. Continue heparin drip; next Xa check 2300.  Silva Williamson RN  October 19, 2020  5:34 PM

## 2020-10-19 NOTE — PLAN OF CARE
4A - Per chart review and discussion with OT, Pt moving IND at this time, primary deficits are in RUE. No use of AD with ambulation, no LOB with ambulation in hallway. OT following for education and to address deficits in UE as needed. Pt with no skilled inpatient PT needs, Will complete consult and defer evaluation, please reconsult as appropriate if patient has decline in functional mobility requiring further skilled inpatient PT needs.

## 2020-10-19 NOTE — PROGRESS NOTES
10/19/20 0900   Quick Adds   Type of Visit Initial Occupational Therapy Evaluation       Present no   Language english   Living Environment   People in home spouse   Current Living Arrangements house   Home Accessibility stairs to enter home   Number of Stairs, Main Entrance 5   Transportation Anticipated car, drives self   Living Environment Comments Pt lives in a house with her . Pt has 5 stairs to enter the home. Pt has a tub/shower combo in the bathroom   Self-Care   Usual Activity Tolerance good   Current Activity Tolerance good   Regular Exercise No   Equipment Currently Used at Home none   Activity/Exercise/Self-Care Comment Pt was previously independent with ADL completion   Disability/Function   Hearing Difficulty or Deaf no   Wear Glasses or Blind no   Concentrating, Remembering or Making Decisions Difficulty no   Difficulty Communicating no   Difficulty Eating/Swallowing no   Walking or Climbing Stairs Difficulty no   Dressing/Bathing Difficulty no   Toileting no   Doing Errands Independently Difficulty (such as shopping) yes   Fall history within last six months no   Change in Functional Status Since Onset of Current Illness/Injury yes   General Information   Onset of Illness/Injury or Date of Surgery 10/17/20   Referring Physician Nick Owusu MD   Patient/Family Therapy Goal Statement (OT) To return home   Additional Occupational Profile Info/Pertinent History of Current Problem Paulina Diana is a 41 yo F with hx hypothyroidism, recent ectopic pregnancy in August who presented to [facility] with 3 days of progressive headache and found to have cerebral vein thrombosis.   Existing Precautions/Restrictions no known precautions/restrictions   Limitations/Impairments sensory   Left Upper Extremity (Weight-bearing Status) full weight-bearing (FWB)   Right Upper Extremity (Weight-bearing Status) full weight-bearing (FWB)   Left Lower Extremity (Weight-bearing Status) full  weight-bearing (FWB)   Right Lower Extremity (Weight-bearing Status) full weight-bearing (FWB)   Heart Disease Risk Factors Overweight   General Observations and Info Activity: ambulate with assist   Cognitive Status Examination   Orientation Status orientation to person, place and time   Affect/Mental Status (Cognitive) WNL   Follows Commands WNL   Cognitive Status Comments Pt is alert, oriented and appropriate in conversation   Visual Perception   Visual Impairment/Limitations WNL   Sensory   Sensory Comments Pt with numbess and tingling in R hand, however has been improving significantly since admission per pt report   Pain Assessment   Patient Currently in Pain No   Integumentary/Edema   Integumentary/Edema no deficits were identifed   Range of Motion Comprehensive   General Range of Motion no range of motion deficits identified   Comment, General Range of Motion BUE ROM WFL   Strength Comprehensive (MMT)   General Manual Muscle Testing (MMT) Assessment no strength deficits identified   Comment, General Manual Muscle Testing (MMT) Assessment BUE grossly 5/5   Transfers   Transfers sit-stand transfer;toilet transfer   Sit-Stand Transfer   Sit-Stand Bremer (Transfers) independent   Toilet Transfer   Bremer Level (Toilet Transfer) independent   Activities of Daily Living   BADL Assessment/Intervention grooming   Grooming Assessment/Training   Bremer Level (Grooming) independent   Instrumental Activities of Daily Living (IADL)   Previous Responsibilities meal prep;housekeeping;laundry;shopping;yardwork;medication management;finances;driving;work   IADL Comments Pt and spouse share IADL responsibilities   Clinical Impression   Criteria for Skilled Therapeutic Interventions Met (OT) yes;meets criteria;skilled treatment is necessary   OT Diagnosis decreased activity tolerance and independence with ADL/IADL completion   OT Problem List-Impairments impacting ADL activity tolerance impaired;sensation  "  Assessment of Occupational Performance 3-5 Performance Deficits   Identified Performance Deficits g/h, toileting, LB dressing, functional mobility   Planned Therapy Interventions (OT) ADL retraining;IADL retraining;home program guidelines;progressive activity/exercise   Clinical Decision Making Complexity (OT) low complexity   Therapy Frequency (OT) 5x/week   Predicted Duration of Therapy 2 days   Anticipated Equipment Needs Upon Discharge (OT) other (see comments)  (TBD)   Risks and Benefits of Treatment have been explained. Yes   Patient, Family & other staff in agreement with plan of care Yes   OT Discharge Planning    OT Discharge Recommendation (DC Rec) Home with assist   OT Rationale for DC Rec Pt is primarily Independent/SBA with ADL completion and functional mobility   OT Brief overview of current status  SBA for transfers and ambulation   [facility] TM \"6 Clicks\"   2016, Trustees of [facility], under license to [employer].  All rights reserved.   6 Clicks Short Forms Daily Activity Inpatient Short Form   [facility] AM-PAC  \"6 Clicks\" Daily Activity Inpatient Short Form   1. Putting on and taking off regular lower body clothing? 4 - None   2. Bathing (including washing, rinsing, drying)? 3 - A Little   3. Toileting, which includes using toilet, bedpan or urinal? 4 - None   4. Putting on and taking off regular upper body clothing? 4 - None   5. Taking care of personal grooming such as brushing teeth? 4 - None   6. Eating meals? 4 - None   Daily Activity Raw Score (Score out of 24.Lower scores equate to lower levels of function) 23   Total Evaluation Time (Minutes)   Total Evaluation Time (Minutes) 5     "

## 2020-10-19 NOTE — PLAN OF CARE
Major Shift Events:  Pt fully neuro intact, aside from some numbness in RUE (mostly R hand), which team is aware of. A&Ox4, following commands with full strength in all extremities. Headache in R temporal region ranging btwn 2/10 and 6/10. Pain controlled best with 1g Magnesium drips and IV push 10mg Compazine. PRN tylenol used as well. Very brief onset of possible nausea toward start of shift, however never fully set in according to pt. NSR most of night with occassional sinus marianne into 40s when sleeping. Maintained SBP goals without intervention. Afebrile. Heparin drip maintained @1800. Hep Xa level this AM is therapeutic @ 0.59. Sats in upper 90s on RA. Up to toilet with good urine output. NS running at 75mL/hr all night. No BMs overnight and pt refused bowel meds b/c she hasn't eaten mush lately. Ate dinner roll and a bit of salad for dinner. Bed bath completed with help of patient.   Plan: Continue to monitor neuro status and stay ahead of headache pain. Transfer to floor today.   For vital signs and complete assessments, please see documentation flowsheets.

## 2020-10-20 ENCOUNTER — APPOINTMENT (OUTPATIENT)
Dept: GENERAL RADIOLOGY | Facility: CLINIC | Age: 40
DRG: 776 | End: 2020-10-20
Attending: PSYCHIATRY & NEUROLOGY
Payer: COMMERCIAL

## 2020-10-20 LAB
AMPHETAMINES UR QL SCN>1000 NG/ML: NEGATIVE
APPEARANCE CSF: CLEAR
B2 GLYCOPROT1 IGG SERPL IA-ACNC: 0.8 U/ML
B2 GLYCOPROT1 IGM SERPL IA-ACNC: <2.9 U/ML
BARBITURATES UR QL SCN: NEGATIVE
BENZODIAZ UR QL SCN: NEGATIVE
BZE UR QL SCN>300 NG/ML: NEGATIVE
CARBOXYTHC UR QL SCN: NEGATIVE
COLOR CSF: COLORLESS
CREAT UR-MCNC: 279 MG/DL
CRYPTOC AG SPEC QL: NORMAL
ETHANOL UR QL: NEGATIVE
GLUCOSE CSF-MCNC: 50 MG/DL (ref 40–70)
GRAM STN SPEC: NORMAL
HSV1 DNA CSF QL NAA+PROBE: NOT DETECTED
HSV2 DNA CSF QL NAA+PROBE: NOT DETECTED
HYDROMORPHONE CTO UR CFM-MCNC: 1728 NG/ML
LA PPP-IMP: NEGATIVE
LMWH PPP CHRO-ACNC: 0.11 IU/ML
LMWH PPP CHRO-ACNC: 0.24 IU/ML
LMWH PPP CHRO-ACNC: 0.35 IU/ML
LMWH PPP CHRO-ACNC: <0.1 IU/ML
METHADONE UR QL SCN: NEGATIVE
MICROBIOLOGIST REVIEW: NORMAL
OPIATES UR QL SCN: NORMAL
OXYCODONE UR CFM-MCNC: 668 NG/ML
OXYCODONE UR QL SCN: NORMAL
OXYMORPHONE UR CFM-MCNC: 797 NG/ML
PCP CTO UR SCN-MCNC: NEGATIVE NG/ML
PROT CSF-MCNC: 91 MG/DL (ref 15–60)
RBC # CSF MANUAL: 24 /UL (ref 0–2)
SPECIMEN SOURCE: NORMAL
SPECIMEN SOURCE: NORMAL
TUBE # CSF: ABNORMAL #
WBC # CSF MANUAL: 1 /UL (ref 0–5)

## 2020-10-20 PROCEDURE — 84157 ASSAY OF PROTEIN OTHER: CPT | Performed by: STUDENT IN AN ORGANIZED HEALTH CARE EDUCATION/TRAINING PROGRAM

## 2020-10-20 PROCEDURE — 36415 COLL VENOUS BLD VENIPUNCTURE: CPT | Performed by: STUDENT IN AN ORGANIZED HEALTH CARE EDUCATION/TRAINING PROGRAM

## 2020-10-20 PROCEDURE — 82164 ANGIOTENSIN I ENZYME TEST: CPT | Performed by: STUDENT IN AN ORGANIZED HEALTH CARE EDUCATION/TRAINING PROGRAM

## 2020-10-20 PROCEDURE — 250N000013 HC RX MED GY IP 250 OP 250 PS 637: Performed by: PSYCHIATRY & NEUROLOGY

## 2020-10-20 PROCEDURE — 86235 NUCLEAR ANTIGEN ANTIBODY: CPT | Performed by: STUDENT IN AN ORGANIZED HEALTH CARE EDUCATION/TRAINING PROGRAM

## 2020-10-20 PROCEDURE — 83916 OLIGOCLONAL BANDS: CPT | Performed by: STUDENT IN AN ORGANIZED HEALTH CARE EDUCATION/TRAINING PROGRAM

## 2020-10-20 PROCEDURE — 82784 ASSAY IGA/IGD/IGG/IGM EACH: CPT | Performed by: STUDENT IN AN ORGANIZED HEALTH CARE EDUCATION/TRAINING PROGRAM

## 2020-10-20 PROCEDURE — 009U3ZX DRAINAGE OF SPINAL CANAL, PERCUTANEOUS APPROACH, DIAGNOSTIC: ICD-10-PCS | Performed by: RADIOLOGY

## 2020-10-20 PROCEDURE — 250N000011 HC RX IP 250 OP 636: Performed by: STUDENT IN AN ORGANIZED HEALTH CARE EDUCATION/TRAINING PROGRAM

## 2020-10-20 PROCEDURE — 250N000013 HC RX MED GY IP 250 OP 250 PS 637: Performed by: STUDENT IN AN ORGANIZED HEALTH CARE EDUCATION/TRAINING PROGRAM

## 2020-10-20 PROCEDURE — 88188 FLOWCYTOMETRY/READ 9-15: CPT | Performed by: PATHOLOGY

## 2020-10-20 PROCEDURE — 87205 SMEAR GRAM STAIN: CPT | Performed by: STUDENT IN AN ORGANIZED HEALTH CARE EDUCATION/TRAINING PROGRAM

## 2020-10-20 PROCEDURE — 87070 CULTURE OTHR SPECIMN AEROBIC: CPT | Performed by: STUDENT IN AN ORGANIZED HEALTH CARE EDUCATION/TRAINING PROGRAM

## 2020-10-20 PROCEDURE — 82945 GLUCOSE OTHER FLUID: CPT | Performed by: STUDENT IN AN ORGANIZED HEALTH CARE EDUCATION/TRAINING PROGRAM

## 2020-10-20 PROCEDURE — 250N000011 HC RX IP 250 OP 636: Performed by: NURSE PRACTITIONER

## 2020-10-20 PROCEDURE — 87529 HSV DNA AMP PROBE: CPT | Performed by: STUDENT IN AN ORGANIZED HEALTH CARE EDUCATION/TRAINING PROGRAM

## 2020-10-20 PROCEDURE — 62270 DX LMBR SPI PNXR: CPT

## 2020-10-20 PROCEDURE — 86255 FLUORESCENT ANTIBODY SCREEN: CPT | Performed by: STUDENT IN AN ORGANIZED HEALTH CARE EDUCATION/TRAINING PROGRAM

## 2020-10-20 PROCEDURE — 36415 COLL VENOUS BLD VENIPUNCTURE: CPT | Performed by: PSYCHIATRY & NEUROLOGY

## 2020-10-20 PROCEDURE — 99233 SBSQ HOSP IP/OBS HIGH 50: CPT | Mod: GC | Performed by: PSYCHIATRY & NEUROLOGY

## 2020-10-20 PROCEDURE — 250N000009 HC RX 250: Performed by: NURSE PRACTITIONER

## 2020-10-20 PROCEDURE — 86256 FLUORESCENT ANTIBODY TITER: CPT | Performed by: STUDENT IN AN ORGANIZED HEALTH CARE EDUCATION/TRAINING PROGRAM

## 2020-10-20 PROCEDURE — 86225 DNA ANTIBODY NATIVE: CPT | Performed by: STUDENT IN AN ORGANIZED HEALTH CARE EDUCATION/TRAINING PROGRAM

## 2020-10-20 PROCEDURE — 87102 FUNGUS ISOLATION CULTURE: CPT | Performed by: STUDENT IN AN ORGANIZED HEALTH CARE EDUCATION/TRAINING PROGRAM

## 2020-10-20 PROCEDURE — 89050 BODY FLUID CELL COUNT: CPT | Performed by: STUDENT IN AN ORGANIZED HEALTH CARE EDUCATION/TRAINING PROGRAM

## 2020-10-20 PROCEDURE — 999N001014 HC STATISTIC CYTO WRIGHT STAIN TC: Performed by: STUDENT IN AN ORGANIZED HEALTH CARE EDUCATION/TRAINING PROGRAM

## 2020-10-20 PROCEDURE — 62328 DX LMBR SPI PNXR W/FLUOR/CT: CPT | Mod: GC | Performed by: RADIOLOGY

## 2020-10-20 PROCEDURE — 87476 LYME DIS DNA AMP PROBE: CPT | Performed by: STUDENT IN AN ORGANIZED HEALTH CARE EDUCATION/TRAINING PROGRAM

## 2020-10-20 PROCEDURE — 82042 OTHER SOURCE ALBUMIN QUAN EA: CPT | Performed by: STUDENT IN AN ORGANIZED HEALTH CARE EDUCATION/TRAINING PROGRAM

## 2020-10-20 PROCEDURE — 87015 SPECIMEN INFECT AGNT CONCNTJ: CPT | Performed by: STUDENT IN AN ORGANIZED HEALTH CARE EDUCATION/TRAINING PROGRAM

## 2020-10-20 PROCEDURE — 88108 CYTOPATH CONCENTRATE TECH: CPT | Mod: 26 | Performed by: PATHOLOGY

## 2020-10-20 PROCEDURE — 85520 HEPARIN ASSAY: CPT | Performed by: NEUROLOGICAL SURGERY

## 2020-10-20 PROCEDURE — 999N001136 HC STATISTIC FLOW INT 9-15 ABY TC 88188: Performed by: STUDENT IN AN ORGANIZED HEALTH CARE EDUCATION/TRAINING PROGRAM

## 2020-10-20 PROCEDURE — 86038 ANTINUCLEAR ANTIBODIES: CPT | Performed by: STUDENT IN AN ORGANIZED HEALTH CARE EDUCATION/TRAINING PROGRAM

## 2020-10-20 PROCEDURE — 87799 DETECT AGENT NOS DNA QUANT: CPT | Performed by: STUDENT IN AN ORGANIZED HEALTH CARE EDUCATION/TRAINING PROGRAM

## 2020-10-20 PROCEDURE — 85520 HEPARIN ASSAY: CPT | Performed by: STUDENT IN AN ORGANIZED HEALTH CARE EDUCATION/TRAINING PROGRAM

## 2020-10-20 PROCEDURE — 88184 FLOWCYTOMETRY/ TC 1 MARKER: CPT | Mod: TC | Performed by: STUDENT IN AN ORGANIZED HEALTH CARE EDUCATION/TRAINING PROGRAM

## 2020-10-20 PROCEDURE — 82040 ASSAY OF SERUM ALBUMIN: CPT | Performed by: STUDENT IN AN ORGANIZED HEALTH CARE EDUCATION/TRAINING PROGRAM

## 2020-10-20 PROCEDURE — 87899 AGENT NOS ASSAY W/OPTIC: CPT | Performed by: STUDENT IN AN ORGANIZED HEALTH CARE EDUCATION/TRAINING PROGRAM

## 2020-10-20 PROCEDURE — 88108 CYTOPATH CONCENTRATE TECH: CPT | Mod: TC | Performed by: STUDENT IN AN ORGANIZED HEALTH CARE EDUCATION/TRAINING PROGRAM

## 2020-10-20 PROCEDURE — 36415 COLL VENOUS BLD VENIPUNCTURE: CPT | Performed by: NEUROLOGICAL SURGERY

## 2020-10-20 PROCEDURE — 120N000002 HC R&B MED SURG/OB UMMC

## 2020-10-20 PROCEDURE — 85520 HEPARIN ASSAY: CPT | Performed by: PSYCHIATRY & NEUROLOGY

## 2020-10-20 PROCEDURE — 250N000009 HC RX 250: Performed by: PSYCHIATRY & NEUROLOGY

## 2020-10-20 PROCEDURE — 88185 FLOWCYTOMETRY/TC ADD-ON: CPT | Mod: TC | Performed by: STUDENT IN AN ORGANIZED HEALTH CARE EDUCATION/TRAINING PROGRAM

## 2020-10-20 RX ORDER — HEPARIN SODIUM 10000 [USP'U]/100ML
0-3500 INJECTION, SOLUTION INTRAVENOUS CONTINUOUS
Status: DISPENSED | OUTPATIENT
Start: 2020-10-20 | End: 2020-10-21

## 2020-10-20 RX ORDER — NICOTINE POLACRILEX 4 MG
15-30 LOZENGE BUCCAL
Status: DISCONTINUED | OUTPATIENT
Start: 2020-10-20 | End: 2020-10-21 | Stop reason: HOSPADM

## 2020-10-20 RX ORDER — LIDOCAINE HYDROCHLORIDE 10 MG/ML
30 INJECTION, SOLUTION EPIDURAL; INFILTRATION; INTRACAUDAL; PERINEURAL ONCE
Status: COMPLETED | OUTPATIENT
Start: 2020-10-20 | End: 2020-10-20

## 2020-10-20 RX ORDER — DEXTROSE MONOHYDRATE 25 G/50ML
25-50 INJECTION, SOLUTION INTRAVENOUS
Status: DISCONTINUED | OUTPATIENT
Start: 2020-10-20 | End: 2020-10-21 | Stop reason: HOSPADM

## 2020-10-20 RX ADMIN — LIDOCAINE HYDROCHLORIDE 5 ML: 10 INJECTION, SOLUTION EPIDURAL; INFILTRATION; INTRACAUDAL; PERINEURAL at 11:33

## 2020-10-20 RX ADMIN — ACETAZOLAMIDE 500 MG: 250 TABLET ORAL at 21:01

## 2020-10-20 RX ADMIN — SODIUM CHLORIDE, SODIUM GLUCONATE, SODIUM ACETATE, POTASSIUM CHLORIDE AND MAGNESIUM CHLORIDE 125 ML/HR: 526; 502; 368; 37; 30 INJECTION, SOLUTION INTRAVENOUS at 13:57

## 2020-10-20 RX ADMIN — ACETAMINOPHEN 650 MG: 325 TABLET, FILM COATED ORAL at 16:36

## 2020-10-20 RX ADMIN — PROCHLORPERAZINE EDISYLATE 10 MG: 5 INJECTION INTRAMUSCULAR; INTRAVENOUS at 20:27

## 2020-10-20 RX ADMIN — LEVOTHYROXINE SODIUM 224 MCG: 112 TABLET ORAL at 08:13

## 2020-10-20 RX ADMIN — SODIUM CHLORIDE, SODIUM GLUCONATE, SODIUM ACETATE, POTASSIUM CHLORIDE AND MAGNESIUM CHLORIDE 125 ML/HR: 526; 502; 368; 37; 30 INJECTION, SOLUTION INTRAVENOUS at 04:21

## 2020-10-20 RX ADMIN — ACETAZOLAMIDE 500 MG: 250 TABLET ORAL at 08:13

## 2020-10-20 RX ADMIN — ACETAMINOPHEN 650 MG: 325 TABLET, FILM COATED ORAL at 12:07

## 2020-10-20 RX ADMIN — HEPARIN SODIUM 1200 UNITS/HR: 10000 INJECTION, SOLUTION INTRAVENOUS at 20:42

## 2020-10-20 ASSESSMENT — ACTIVITIES OF DAILY LIVING (ADL)
ADLS_ACUITY_SCORE: 12

## 2020-10-20 NOTE — PROCEDURES
[facility] Procedure Note          Lumbar Puncture:      Time: 11:48 AM  Performed by: Genna Champion MD  Authorized by: Alejandro Abbott MD    Indications: headache    Consent given by: Patient who states understanding of the procedure being performed after discussing the risks, benefits and alternatives.    Prior to the start of the procedure and with procedural staff participation, I verbally confirmed the patient s identity using two indicators, relevant allergies, that the procedure was appropriate and matched the consent or emergent situation, and that the correct equipment/implants were available. Immediately prior to starting the procedure I conducted the Time Out with the procedural staff and re-confirmed the patient s name, procedure, and site/side. (The Joint Commission universal protocol was followed.) Yes    Under sterile conditions the patient was positioned prone . Betadine solution and sterile drapes were utilized.  Local anesthetic at the site: 2 ml of lidocaine 1% without epinephrine from the LP tray  A 22 G Pencil point spinal needle was inserted at the L 3-4 interspace.  Opening Pressure 21 cm H2O pressure.  A total of 14 mL of clear and colorless spinal fluid was obtained and sent to the laboratory.   After the needle was removed, a bandaid and pressure were applied and the patient was instructed to stay horizontal until the results were back.    Complications:  None    Patient tolerance: Patient tolerated the procedure well with no immediate complications.

## 2020-10-20 NOTE — PROGRESS NOTES
[facility]  Neurocritical Care Progress Note    Patient Name:  Paulina Diana  MRN:  [id number]    :  1980  Primary care provider:  Ismael Ribera  Date of Admission: 10/17/2020  Date of Service:  2020    Chief Complaint:  No chief complaint on file.    Patient Summary:  Paulina Diana is a 39 yo F with PMH hypothyroidism and recent ectopic pregnancy in August who presented to [facility] with 3 days of progressive headache and was found to have cerebral vein thrombosis. IR LP (10/20).    Subjective:  No acute events overnight. Hemodynamically stable. Headache is about 2/10 and feels the same as prior (pressure behind right eye). Denies any dizziness, trouble walking, N/V, abd pain, muscle pain. Mild back soreness but no tingling/numbness or shooting pain.     ROS: See HPI, 7 point review of systems otherwise negative.     Objective:  Medications:  Current Facility-Administered Medications   Medication     acetaminophen (TYLENOL) tablet 650 mg    Or     acetaminophen (TYLENOL) solution 650 mg    Or     acetaminophen (TYLENOL) Suppository 650 mg     acetaZOLAMIDE (DIAMOX) tablet 500 mg     diphenhydrAMINE (BENADRYL) injection 25 mg     heparin bolus from infusion pump     levothyroxine (SYNTHROID/LEVOTHROID) tablet 224 mcg     magnesium sulfate 1 gm in 100mL D5W intermittent infusion     magnesium sulfate 4 g in 100 mL sterile water (premade)     naloxone (NARCAN) injection 0.1-0.4 mg     ondansetron (ZOFRAN) injection 4 mg     Plasma-Lyte A (electrolyte A) solution     potassium chloride (KLOR-CON) Packet 20-40 mEq     potassium chloride 10 mEq in 100 mL intermittent infusion with 10 mg lidocaine     potassium chloride 10 mEq in 100 mL sterile water intermittent infusion (premix)     potassium chloride 20 mEq in 50 mL intermittent infusion     potassium chloride ER (KLOR-CON M) CR tablet 20-40 mEq     prochlorperazine (COMPAZINE) injection 10 mg     senna-docusate  (SENOKOT-S/PERICOLACE) 8.6-50 MG per tablet 1-2 tablet     Vitals:  Temp: 98.1  F (36.7  C) Temp  Min: 97.6  F (36.4  C)  Max: 98.3  F (36.8  C)  Resp: 20 Resp  Min: 11  Max: 21  SpO2: 99 % SpO2  Min: 95 %  Max: 100 %  Pulse: 50 Pulse  Min: 50  Max: 101    No data recorded  BP: 117/86 Systolic (24hrs), Av , Min:108 , Max:149   Diastolic (24hrs), Av, Min:74, Max:98    Infusions:     Plasma-Lyte A 125 mL/hr (10/20/20 0500)     Physical Exam:  Constitutional: Sleeping, but awoke when entered room. No acute distress.  ENT: Moist mucous membranes.  Cardiovascular: Regular rate and rhythm. No murmurs appreciated.  Respiratory: No increased work of breathing. No accessory muscle use.  Gastrointestinal: No distention or abd tenderness with palpation.  Musculoskeletal: No lower extremity pitting edema. Moves all extremities spontaneously.    Neurologic Exam:   Mental Status: Alert and oriented.   Cranial Nerves: bilateral pupils 3mm and briskly reactive to light. EOMI. Palate raised symmetrically. No facial asymmetry.  Motor: 5/5 strength in bilateral upper and lower extremities.   Sensory: No objective sensory difference between right and left hand with dull touch. Equal and intact sensation to dull touch in bilateral lower extremities.  Coordination: Intact finger tapping.     Labs/Imaging:  Recent Labs   Lab 10/19/20  1732 10/18/20  0538 10/18/20  0024 10/17/20  1752   WBC 7.2  --  9.8 9.6   HGB 13.2  --  12.6 13.0   MCV 93  --  91 93    281 295 334   INR  --   --  1.02 0.98   NA  --   --  141 141   POTASSIUM  --   --  3.6 3.5   CHLORIDE  --   --  110* 109   CO2  --   --  24 23   BUN  --   --  10 10   CR  --   --  0.83 0.83   ANIONGAP  --   --  7 9   CHRISTIAN  --   --  8.6 8.3*   GLC  --   --  80 104*   ALBUMIN  --   --  3.0*  --    PROTTOTAL  --   --  7.3  --    BILITOTAL  --   --  0.3  --    ALKPHOS  --   --  54  --    ALT  --   --  23  --    AST  --   --  10  --    TROPI  --   --  <0.015 <0.015      Assessment and Plan:  Paulina Diana is a 41 yo F with PMH hypothyroidism and recent ectopic pregnancy in August who presented to [facility] with 3 days of progressive headache and was found to have cerebral vein thrombosis.     Neuro:  #Provoked symptomatic cerebral sinus vein thrombosis 2/2 recent ectopic pregnancy   MRI/MRV showed total occlusion of the superior sagittal sinus, right transverse sinus, right sigmoid sinus, and proximal right internal jugular vein. Suspect due to prothrombotic state from recent ectopic pregnancy. Plan to transition to lovenox as bridge to warfarin once stable. Warfarin INR goal 2-3. Will need repeat imaging in 3 months. Hep 10A level 0.99 (10/19).   - Heparin gtt. Hep10A level goal 0.3-0.7 (held at 6am for LP; restart after LP)  - Neuro checks q4hrs   - PT/OT, appreciate recs   +OT recommends home with assist (10/19)  - If any change in neuro exam, obtain head CT.   - Transfer to floor bed after LP with IR    Hypercoagulable workup:   - Protein C: pending  - Protein S: pending  - Cardiolipin: negative  - Antithrombin III: 95%  - Factor 2 and 5 mutation: pending  - Factor 2 and 5 assay: Factor 2 - 155, Factor 5 - 109  - Beta 2 glycoprotein: negative  - Lupus anticoagulant: pending  - Homocysteine: pending    #Leptomeningeal enhancement on MRI  MRI showed scattered leptomeningeal enhancement and associated diffusion restriction throughout bilateral cerebral hemispheres. Differential includes: infection, metastasis, and autoimmune disease. LP was unsuccessful on floor 10/19.  - LP with IR 10/20 (holding heparin gtt)  - Follow up CSF labs: Cell count + diff, total protein, glucose, cytology, aerobic culture, oligoclonal bands, lyme disease, EBV, HSV, ACE  - Serum PIEDAD, JOSE, C-ANCA, ACE, ds-DNA, anti-Ro, anti-La, anti-Liliam    #Headache  Pt requiring no prns. Pain tolerable at 2/10.  - Acetaminophen 650 q4h  - Mg 1g q4hrs prn  - Prochlorperazine 10mg IV q6h  - Acetazolamide 500mg BID  -  Benedryl 25mg IV if develops dystonia    Cardiovascular:  #Hypertriglyceridemia  #Hyperlipidemia  LDL 78. TGs 165.  - Follow up with primary care provider as outpatient.     #Hypertension   - Continue monitoring    Respiratory:  No acute issues.    Gastrointestinal:  #Poor appetite - improving  - Encourage PO intake and fluids  - Avoid dehydration  - Bowel regimen (encourage not straining; last BMP 10/17)    Diet: regular    Renal:  No acute issues.    IVF: 125 mL/hr plasmalyte    Endocrine:  #Hypothyroidism  TSH 0.49 (10/19).  - pta levothyroxine    Heme:  No acute issues.    ID:  T max Temp (24hrs), Av  F (36.7  C), Min:97.6  F (36.4  C), Max:98.3  F (36.8  C)  No acute issues.      Checklist:  FEN: regular, encourage fluids; 125 mL/hr plasmalyte IVF  Ppx:  DVT (heparin gtt - held for LP), SCDs  Lines: 3 PIVs  Code Status: full    Dispo: transfer to floor after LP with IR    Patient seen and discussed with attending neurologist Dr. Braden Raymond.    Hansa Swain, MS4    Resident/Fellow Attestation   I, Iwona Bowen, was present with the medical student who participated in the service and in the documentation of the note.  I have verified the history and personally performed the physical exam and medical decision making.  I agree with the assessment and plan of care as documented in the note.      I have edited the note above to reflect the shared evaluation and medical decision making between myself and the medical student.    Headaches improved this morning after initiation of Diamox. Still has some UE numbness but also improved. Anxious about LP, but understands that it needs to be done.   Will hold heparin gtt prior to LP then resume following completion.   Transfer to  after LP.    The patient was seen and discussed with the fellow, Dr. Burr, and NCC attending, Dr. Raymond.    Iwona Bowen MD  PGY`2  Date of Service (when I saw the patient): 10/20/20

## 2020-10-20 NOTE — PLAN OF CARE
No significant changes overnight. Heparin gtt stopped at 0600 for planned LP in IR this morning.    Neuro: intact, denies headache, oriented x 4,speech clear, face symmetrical, 5+ strength in all extremities, slight numbness in right hand unchanged. Q2 neuro  CV: VSS, afebrile. NSR/sinus marianne  RR: lungs clear on room air  GI: abdomen soft, +BS, BM 10/19, tolerating regular diet  : voids spontaneously   Skin: LP site band-aid c/d/I    PIV x 2, Plasmalyte @ 125ml/hr

## 2020-10-20 NOTE — PLAN OF CARE
Transferred to: 6A at 1600  Belongings: two bags of belongings, cell phone  Dickson removed? n/a  Central line removed? n/a  Chart and medications sent with patient: yes  Family notified: no, patient already made aware    Patient had lumbar puncture at 1100 and returned to unit around 1200. Puncture site CDI. No neuro changes. PRN tylenol given x1 for pain. Heparin drip restarted at 1430 after LP; currently running at 1200 units/hr. VSS. Report called to 6A at around 1445. Sent up to 6A w/ transport in a wheelchair.

## 2020-10-21 ENCOUNTER — TELEPHONE (OUTPATIENT)
Dept: OTHER | Facility: CLINIC | Age: 40
End: 2020-10-21

## 2020-10-21 ENCOUNTER — ANTICOAGULATION THERAPY VISIT (OUTPATIENT)
Dept: ANTICOAGULATION | Facility: CLINIC | Age: 40
End: 2020-10-21

## 2020-10-21 ENCOUNTER — APPOINTMENT (OUTPATIENT)
Dept: OCCUPATIONAL THERAPY | Facility: CLINIC | Age: 40
DRG: 776 | End: 2020-10-21
Attending: PSYCHIATRY & NEUROLOGY
Payer: COMMERCIAL

## 2020-10-21 VITALS
TEMPERATURE: 98.3 F | SYSTOLIC BLOOD PRESSURE: 134 MMHG | OXYGEN SATURATION: 97 % | HEART RATE: 73 BPM | DIASTOLIC BLOOD PRESSURE: 90 MMHG | WEIGHT: 256.62 LBS | RESPIRATION RATE: 16 BRPM | BODY MASS INDEX: 42.7 KG/M2

## 2020-10-21 DIAGNOSIS — G08 CEREBRAL VENOUS THROMBOSIS OF CORTICAL VEIN: ICD-10-CM

## 2020-10-21 LAB
ACE SERPL-CCNC: 22 U/L (ref 9–67)
ANA SER QL IF: NEGATIVE
ANCA AB PATTERN SER IF-IMP: ABNORMAL
C-ANCA TITR SER IF: ABNORMAL {TITER}
COPATH REPORT: NORMAL
COPATH REPORT: NORMAL
DSDNA AB SER-ACNC: 1 IU/ML
ENA JO1 IGG SER-ACNC: <0.2 AI (ref 0–0.9)
ENA RNP IGG SER IA-ACNC: <0.2 AI (ref 0–0.9)
ENA SM IGG SER-ACNC: <0.2 AI (ref 0–0.9)
ENA SS-A IGG SER IA-ACNC: <0.2 AI (ref 0–0.9)
ENA SS-A IGG SER IA-ACNC: <0.2 AI (ref 0–0.9)
ENA SS-B IGG SER IA-ACNC: <0.2 AI (ref 0–0.9)
ENA SS-B IGG SER IA-ACNC: <0.2 AI (ref 0–0.9)
HCYS SERPL-SCNC: 13.2 UMOL/L (ref 4–12)
INR PPP: 1.04 (ref 0.86–1.14)
LMWH PPP CHRO-ACNC: 0.25 IU/ML
PLATELET # BLD AUTO: 330 10E9/L (ref 150–450)
PROT C ACT/NOR PPP CHRO: 97 % (ref 70–170)
PROT S FREE AG ACT/NOR PPP IA: 81 % (ref 55–125)

## 2020-10-21 PROCEDURE — 84165 PROTEIN E-PHORESIS SERUM: CPT | Mod: 26 | Performed by: PATHOLOGY

## 2020-10-21 PROCEDURE — 84166 PROTEIN E-PHORESIS/URINE/CSF: CPT | Mod: 26 | Performed by: PATHOLOGY

## 2020-10-21 PROCEDURE — 86334 IMMUNOFIX E-PHORESIS SERUM: CPT | Mod: TC | Performed by: STUDENT IN AN ORGANIZED HEALTH CARE EDUCATION/TRAINING PROGRAM

## 2020-10-21 PROCEDURE — 250N000011 HC RX IP 250 OP 636: Performed by: STUDENT IN AN ORGANIZED HEALTH CARE EDUCATION/TRAINING PROGRAM

## 2020-10-21 PROCEDURE — 84166 PROTEIN E-PHORESIS/URINE/CSF: CPT | Mod: TC | Performed by: STUDENT IN AN ORGANIZED HEALTH CARE EDUCATION/TRAINING PROGRAM

## 2020-10-21 PROCEDURE — 86334 IMMUNOFIX E-PHORESIS SERUM: CPT | Mod: 26 | Performed by: PATHOLOGY

## 2020-10-21 PROCEDURE — 250N000011 HC RX IP 250 OP 636: Performed by: NURSE PRACTITIONER

## 2020-10-21 PROCEDURE — 97112 NEUROMUSCULAR REEDUCATION: CPT | Mod: GO | Performed by: OCCUPATIONAL THERAPIST

## 2020-10-21 PROCEDURE — 85049 AUTOMATED PLATELET COUNT: CPT | Performed by: PSYCHIATRY & NEUROLOGY

## 2020-10-21 PROCEDURE — 999N001036 HC STATISTIC TOTAL PROTEIN: Performed by: STUDENT IN AN ORGANIZED HEALTH CARE EDUCATION/TRAINING PROGRAM

## 2020-10-21 PROCEDURE — 36415 COLL VENOUS BLD VENIPUNCTURE: CPT | Performed by: STUDENT IN AN ORGANIZED HEALTH CARE EDUCATION/TRAINING PROGRAM

## 2020-10-21 PROCEDURE — 250N000009 HC RX 250: Performed by: NURSE PRACTITIONER

## 2020-10-21 PROCEDURE — 250N000013 HC RX MED GY IP 250 OP 250 PS 637: Performed by: STUDENT IN AN ORGANIZED HEALTH CARE EDUCATION/TRAINING PROGRAM

## 2020-10-21 PROCEDURE — 85610 PROTHROMBIN TIME: CPT | Performed by: STUDENT IN AN ORGANIZED HEALTH CARE EDUCATION/TRAINING PROGRAM

## 2020-10-21 PROCEDURE — 250N000013 HC RX MED GY IP 250 OP 250 PS 637: Performed by: PSYCHIATRY & NEUROLOGY

## 2020-10-21 PROCEDURE — 82784 ASSAY IGA/IGD/IGG/IGM EACH: CPT | Performed by: STUDENT IN AN ORGANIZED HEALTH CARE EDUCATION/TRAINING PROGRAM

## 2020-10-21 PROCEDURE — 86335 IMMUNFIX E-PHORSIS/URINE/CSF: CPT | Mod: 26 | Performed by: PATHOLOGY

## 2020-10-21 PROCEDURE — 86335 IMMUNFIX E-PHORSIS/URINE/CSF: CPT | Mod: TC | Performed by: STUDENT IN AN ORGANIZED HEALTH CARE EDUCATION/TRAINING PROGRAM

## 2020-10-21 PROCEDURE — 84165 PROTEIN E-PHORESIS SERUM: CPT | Mod: TC | Performed by: STUDENT IN AN ORGANIZED HEALTH CARE EDUCATION/TRAINING PROGRAM

## 2020-10-21 PROCEDURE — 99239 HOSP IP/OBS DSCHRG MGMT >30: CPT | Performed by: PSYCHIATRY & NEUROLOGY

## 2020-10-21 PROCEDURE — 85520 HEPARIN ASSAY: CPT | Performed by: STUDENT IN AN ORGANIZED HEALTH CARE EDUCATION/TRAINING PROGRAM

## 2020-10-21 RX ORDER — WARFARIN SODIUM 5 MG/1
5 TABLET ORAL DAILY
Qty: 30 TABLET | Refills: 3 | Status: SHIPPED | OUTPATIENT
Start: 2020-10-22 | End: 2020-11-09

## 2020-10-21 RX ORDER — WARFARIN SODIUM 5 MG/1
5 TABLET ORAL DAILY
Qty: 30 TABLET | Refills: 3 | Status: CANCELLED | OUTPATIENT
Start: 2020-10-21

## 2020-10-21 RX ORDER — WARFARIN SODIUM 5 MG/1
5 TABLET ORAL
Status: DISCONTINUED | OUTPATIENT
Start: 2020-10-22 | End: 2020-10-21 | Stop reason: HOSPADM

## 2020-10-21 RX ORDER — ACETAZOLAMIDE 250 MG/1
500 TABLET ORAL 2 TIMES DAILY
Qty: 120 TABLET | Refills: 3 | Status: SHIPPED | OUTPATIENT
Start: 2020-10-21 | End: 2020-11-16

## 2020-10-21 RX ORDER — HEPARIN SODIUM 10000 [USP'U]/100ML
0-3500 INJECTION, SOLUTION INTRAVENOUS CONTINUOUS
Status: ACTIVE | OUTPATIENT
Start: 2020-10-21 | End: 2020-10-21

## 2020-10-21 RX ORDER — WARFARIN SODIUM 7.5 MG/1
7.5 TABLET ORAL ONCE
Qty: 1 TABLET | Refills: 0 | Status: CANCELLED | OUTPATIENT
Start: 2020-10-21 | End: 2020-10-21

## 2020-10-21 RX ORDER — WARFARIN SODIUM 7.5 MG/1
7.5 TABLET ORAL ONCE
Status: COMPLETED | OUTPATIENT
Start: 2020-10-21 | End: 2020-10-21

## 2020-10-21 RX ADMIN — PROCHLORPERAZINE EDISYLATE 10 MG: 5 INJECTION INTRAMUSCULAR; INTRAVENOUS at 07:30

## 2020-10-21 RX ADMIN — ENOXAPARIN SODIUM 120 MG: 120 INJECTION SUBCUTANEOUS at 12:03

## 2020-10-21 RX ADMIN — SODIUM CHLORIDE, SODIUM GLUCONATE, SODIUM ACETATE, POTASSIUM CHLORIDE AND MAGNESIUM CHLORIDE 125 ML/HR: 526; 502; 368; 37; 30 INJECTION, SOLUTION INTRAVENOUS at 00:24

## 2020-10-21 RX ADMIN — ACETAMINOPHEN 650 MG: 325 TABLET, FILM COATED ORAL at 04:49

## 2020-10-21 RX ADMIN — ACETAMINOPHEN 650 MG: 325 TABLET, FILM COATED ORAL at 00:36

## 2020-10-21 RX ADMIN — ACETAZOLAMIDE 500 MG: 250 TABLET ORAL at 07:35

## 2020-10-21 RX ADMIN — LEVOTHYROXINE SODIUM 224 MCG: 112 TABLET ORAL at 07:35

## 2020-10-21 RX ADMIN — SODIUM CHLORIDE, SODIUM GLUCONATE, SODIUM ACETATE, POTASSIUM CHLORIDE AND MAGNESIUM CHLORIDE 125 ML/HR: 526; 502; 368; 37; 30 INJECTION, SOLUTION INTRAVENOUS at 08:00

## 2020-10-21 RX ADMIN — WARFARIN SODIUM 7.5 MG: 7.5 TABLET ORAL at 13:08

## 2020-10-21 ASSESSMENT — VISUAL ACUITY
OU: NORMAL ACUITY

## 2020-10-21 ASSESSMENT — ACTIVITIES OF DAILY LIVING (ADL)
ADLS_ACUITY_SCORE: 12
ADLS_ACUITY_SCORE: 10
ADLS_ACUITY_SCORE: 12
ADLS_ACUITY_SCORE: 12

## 2020-10-21 NOTE — PROGRESS NOTES
Patient was referred to the [facility] INR clinic since she was started on warfarin during her most recent hospitalization.  However, her PCP is with [facility].  Called and spoke with patient.  She has an appointment with her current PCP tomorrow and plans to discuss warfarin management through their office.      Will discharge patient from the [facility] INR clinic.  Lindsey Carrera RN

## 2020-10-21 NOTE — CONSULTS
Care Management Assessment and Discharge Consult    General Information  Assessment completed with:: Patient, Paulina  Type of CM/SW Visit: Offer D/C Planning  Primary Care Provider verified and updated as needed?: Yes  Readmission Within the Last 30 Days: no previous admission in last 30 days       Reason for Consult: (New stroke patient/anticoagulation )  Advance Care Planning: No documents on file         Communication Assessment  Patient's communication style: spoken language (English or Bilingual).  Hearing Difficulty or Deaf: no. Wear Glasses or Blind: no.    Cognitive  Cognitive/Neuro/Behavioral: WDL.  Level of Consciousness: alert.  Arousal Level: opens eyes spontaneously.  Orientation: oriented x 4.  Mood/Behavior: calm, cooperative.  Best Language: 0 - No aphasia.  Speech: clear.    Living Environment:   People in home: spouse     Current living Arrangements: house          Lifestyle & Psychosocial Needs:      Socioeconomic History     Marital status:      Spouse name: Not on file     Number of children: Not on file     Years of education: Not on file     Highest education level: Not on file     Tobacco Use     Smoking status: Never Smoker     Smokeless tobacco: Never Used     Tobacco comment:  on occasion   Substance and Sexual Activity     Alcohol use: No     Alcohol/week: 0.0 standard drinks     Comment: rare     Drug use: No     Sexual activity: Yes     Partners: Male       Mental Health Status:  WDL                   Discharge Planning:  Expected Discharge Date: 10/21/20  Expected Time of Departure: TBD  Concerns to be Addressed:  discharge planning  Anticoagulation Clinic.    Anticipated Discharge Disposition:  Home  Anticipated Discharge Services:  Anticoagulation management  Anticipated Discharge DME:  None    Education Provided on the Discharge Plan:  Yes  Patient/Family in Agreement with the Plan:  Yes  ___________________________________________________     Data  Chart reviewed. Pt  transferred from [facility] on 10- with a progressive headache, found to have a cerebral venous thrombosis. In 6A Discharge Rounds Dr Rowley reported pt will discharge on Lovenox & Warfarin, anticipate discharge today.   PT:  no inpatient PT needs.  OT:  Continue home exercise program; outpt OT if hand does not return to baseline.     Coordination of Care & Referrals  Introduced myself to pt and explained I help with discharge planning. Pt alert, sitting up in bed, wants to go home soon. Pt aware she will be discharging on Lovenox & Warfarin. Has not received doses yet in the hospital. Explained she will need to have INR labs drawn, perhaps 2x/week initially, until her INR is in the goal range. Pt said her primary is Dr Ismael Ribera at [facility]. I will contact the clinic and confirm Dr Ribera will manage her Warfarin.   Pt had questions about taking her thyroid medication and the Warfarin. She also had questions about birth control medication. I will page the doctor to speak with her (Neuro Stroke paged).   Confirmed pt's cell phone number on the facesheet.     Called [facility] at [phone]. They do not have an Anticoagulation Clinic. Dr Ribera will manage pt's anticoagulation. Pt needs to be seen by a provider. Scheduled appt for Thursday, October 22nd at 11am with Leonarda Egan NP. Pt needs to arrive at 10:45am, she needs to call from the clinic parking lot before going inside. Needs to wear a mask. [facility] has Epic and they will be able to access pt's information, no need to fax any info.   Appointment information entered on the AVS.  Informed pt of clinic appt tomorrow.          Cheyenne Anderson, RN Care Coordinator  Unit [address], [facility]

## 2020-10-21 NOTE — PLAN OF CARE
VSS.  Pt with mild headache (worse when standing up) and soreness at LP site; adequately relieved with PRN Tylenol x 2. Neuros intact except improving numbness to R hand.  MIVF running 125/hr.  Heparin gtt running 1200 units/hr; waiting for AM Hep 10A draw-will adjust if needed.  Voiding spontaneously.  Pt with menses.  Last BM 10/20.  Up with SBA.  On cardiac monitoring.  LP site FARIDA, flat, bruised.  Stroke PLC completed.  Home when medically ready.  Continue with POC.      Addendum:  At 0630 RN noted the heparin gtt was discontinued.  Writer verified with Neurology night cross cover whether or not they actually want Heparin gtt still running.  MD stated she will look into it and get back to me.  MD stated okay to keep Heparin gtt running 1200 units/hour until she get answer. New Heparin gtt order placed this AM.

## 2020-10-21 NOTE — PLAN OF CARE
D/I:Alert and oriented. HA better today, but still there.L sclera red and she states that she has this all the time. Intact except R hand numbness. Has been up in the room and to bathroom.Voiding spont.PO'd breakfast-wanted lunch outside of hospital.Had her give herself her Lovenox injection and she did well. Discharge instructions discussed and she has no further questions .  P:Just took to pharmacy and front doors to meet her ride.

## 2020-10-21 NOTE — TELEPHONE ENCOUNTER
"Pt referred to [facility] by Monae Brown MD for Cerebral venous thrombosis of cortical vein. Referral notes state to please schedule with Dr. Dickson.     10/17/20 neurology consult note from Nick Owusu MD:  \"Extensive cerebral venous thrombosis- Etiology is unclear, suspecting a hypercoagulable state, recent ectopic pregnancy  -will need hypercoagulable labs as outpatient\"    It appears that patient is being referred for workup of hypercoagulable state but it is not clear in referral or in any of the hospital notes why patient is being referred to [facility] as this is a neurology patient.    Patient has multiple images in Epic.    Patient is currently inpatient at [facility].     Discussed this referral with Dr. Dickson who is in agreement to see patient. Routing to scheduling to call patient and schedule in-person new consult with Dr. Dickson at next available. Patient is currently inpatient and will need to be contacted once discharged.    Marcia BOWSER, RN    [facility]  Office: [phone]  Fax: [phone]      "

## 2020-10-21 NOTE — PLAN OF CARE
Status: 10/17 transferred to [facility] after CT showed cerebral vein thrombus. Lumbar puncture 10/20.  Vitals: VSS on CCM   Neuros: A&Ox4 Numbness to L hand but states is getting better. 5/5 extremities.   IV: PIV-Heparin drip/plasmolyte   Resp/trach: WNL-on RA   Diet: Regular   Bowel status: Last BM 10/19  : Voiding w/o difficulty   Skin: Intact. LP site busied no drainage.   Pain: LP site pain. Using tylenol and compazine.   Activity: SBA   Social: Father Dylan is designated visitor.   Plan: Continue to monitor and follow POC.     Arrived from:  4A  Belongings/meds:  With pt   2 RN Skin Assessment Completed by:  Sapna RN, Carlita RN   Non-intact findings documented (yes/no/NA): LP site brusied on back. Band aid taken off.

## 2020-10-21 NOTE — PLAN OF CARE
Occupational Therapy Discharge Summary    Reason for therapy discharge:    All OT goals met    Progress towards therapy goal(s). See goals on Care Plan in [facility] electronic health record for goal details.  Goals met    Therapy recommendation(s):    Continue home exercise program. If R hand numbness/strength does not return to baseline, recommend follow up with OP OT

## 2020-10-22 ENCOUNTER — OFFICE VISIT (OUTPATIENT)
Dept: FAMILY MEDICINE | Facility: CLINIC | Age: 40
End: 2020-10-22

## 2020-10-22 VITALS
HEART RATE: 105 BPM | SYSTOLIC BLOOD PRESSURE: 122 MMHG | RESPIRATION RATE: 16 BRPM | OXYGEN SATURATION: 96 % | HEIGHT: 66 IN | WEIGHT: 250 LBS | DIASTOLIC BLOOD PRESSURE: 82 MMHG | TEMPERATURE: 97.8 F | BODY MASS INDEX: 40.18 KG/M2

## 2020-10-22 DIAGNOSIS — Z79.01 LONG TERM CURRENT USE OF ANTICOAGULANT THERAPY: ICD-10-CM

## 2020-10-22 DIAGNOSIS — E66.01 MORBID OBESITY (H): ICD-10-CM

## 2020-10-22 DIAGNOSIS — Z79.01 ANTICOAGULATION MONITORING, INR RANGE 2-3: ICD-10-CM

## 2020-10-22 DIAGNOSIS — G08 CEREBRAL VENOUS THROMBOSIS OF CORTICAL VEIN: Primary | ICD-10-CM

## 2020-10-22 PROBLEM — O00.109 ECTOPIC PREGNANCY, TUBAL: Status: RESOLVED | Noted: 2018-01-28 | Resolved: 2020-10-22

## 2020-10-22 PROBLEM — O00.80 PREGNANCY, ECTOPIC, CORNUAL OR CERVICAL: Status: RESOLVED | Noted: 2020-08-11 | Resolved: 2020-10-22

## 2020-10-22 LAB
ACE CSF-CCNC: 2.7 U/L (ref 0–2.5)
ALBUMIN SERPL ELPH-MCNC: 3.5 G/DL (ref 3.7–5.1)
ALPHA1 GLOB SERPL ELPH-MCNC: 0.5 G/DL (ref 0.2–0.4)
ALPHA2 GLOB SERPL ELPH-MCNC: 0.9 G/DL (ref 0.5–0.9)
B-GLOBULIN SERPL ELPH-MCNC: 1.1 G/DL (ref 0.6–1)
COPATH REPORT: NORMAL
GAMMA GLOB SERPL ELPH-MCNC: 1.3 G/DL (ref 0.7–1.6)
IGA SERPL-MCNC: 331 MG/DL (ref 84–499)
IGG SERPL-MCNC: 1270 MG/DL (ref 610–1616)
IGM SERPL-MCNC: 175 MG/DL (ref 35–242)
M PROTEIN SERPL ELPH-MCNC: 0 G/DL
PROT ELPH PNL UR ELPH: NORMAL
PROT PATTERN SERPL ELPH-IMP: ABNORMAL
PROT PATTERN SERPL IFE-IMP: NORMAL
PROT PATTERN UR ELPH-IMP: NORMAL

## 2020-10-22 PROCEDURE — 99495 TRANSJ CARE MGMT MOD F2F 14D: CPT | Mod: 25 | Performed by: NURSE PRACTITIONER

## 2020-10-22 PROCEDURE — 93793 ANTICOAG MGMT PT WARFARIN: CPT | Performed by: NURSE PRACTITIONER

## 2020-10-22 RX ORDER — NORGESTIMATE AND ETHINYL ESTRADIOL 0.25-0.035
KIT ORAL
COMMUNITY
Start: 2020-10-16 | End: 2020-10-22

## 2020-10-22 ASSESSMENT — MIFFLIN-ST. JEOR: SCORE: 1830.74

## 2020-10-22 NOTE — DISCHARGE SUMMARY
[facility]     Neurology Stroke Discharge Summary    Date of Admission: 10/17/2020  Date of Discharge: 10/21/2020    Disposition: Discharged to home  Primary Care Physician: Ismael Ribera      Admission Diagnosis:   Cerebral Venous Sinus Thrombosis    Discharge Diagnosis:   Cerebral Venous Sinus Thrombosis    Problem Leading to Hospitalization (from [facility]):  Persistent headache x 3 days    Please see H&P dated 10/17/2020 for further details about presentation.    Brief Hospital Course:   Patient presented with persistent headache x 3 days that woke up her from sleep. Symptoms included photophobia, nausea and vomiting.  In addition pt woke from sleep with numbness and clumsiness of right hand.    Pt presented at OSH where she was found to have CVST.   She was transferred to [facility] for further evaluation.    CT head showed hyperdensities suggestive of sinus vein thrombosis, CTV revealed SSS, R transversae sinus, sigmoid sinuse and upper jugular thrombosis and questionable right veinous infarct.  CTP perfusion abnormalities did not correspond with arterial territories.     Found to have CVST 2/2 prothrombotic state due to recent ectopic pregnancy. She was treated with heparin gtt charles intensity treatment, eventually transitioned warfarin with Lovenox bridge.   Also found incidentally during workup was leptomeningeal enhancement on MRI Brain, being worked up.      Work-up as stated below under Pertinent Investigations    Hypercoagulable workup:   - Protein C  - Protein S  - Cardiolipin  - Antithrombin III  - Factor 2 and 5 mutation  - Factor 2 and 5 assay  - Beta 2 glycoprotein  - Lupus anticoagulant  - Homocysteine    - CSF labs for leptomeningeal enhancement:  - Cell count + diff  - total protein  - glucose  - cytology   - aerobic culture  - oligoclonal bands  - lyme disease  - EBV  - HSV  - ACE    Pending test results:  - Oligoclonal Banding CSF  - Lyme disease DNA  PCR CSF  - EBV PCR CSF  - ACE CSF  - Cytology CSF    Rehab evaluation: OT.     Smoking Cessation: patient is not a smoker    BP Long-term Goal: 140/90 or less    Antithrombotic/Anticoagulant Agent: warfarin with goal INR 2-3.  Follow-up: New start on warfarin - schedule for next available Anticoagulation clinic day and bridge with Lovenox until directed by INR clinic      Hgb A1C Goal: < 7.0    Complications: None.     Other problems addressed during this hospitalization:  None    PERTINENT INVESTIGATIONS    Labs  Lipid Panel:   Recent Labs   Lab Test 10/18/20  0024   CHOL 163   HDL 53   LDL 78   TRIG 165*     A1C: No results found for: A1C  INR:   Recent Labs   Lab 10/21/20  0544 10/18/20  0024 10/17/20  1752   INR 1.04 1.02 0.98          Imaging:    CT Head Perfusion - 10/17/20  FINDINGS/IMPRESSION:  PERFUSION MAPS: Abnormal reduce perfusion characteristics correspond to the superior sagittal sinus torcula herophili and region of the right transverse sinus. This is compatible with venous sinus thrombosis. Perfusion pattern overall is nonspecific to   suggest associated atypical/venous phase infarction, MRI with diffusion-weighted imaging would have a higher sensitivity.  RAPID ANALYSIS:  CBF<30%: 0 mL  Tmax>6sec: 12 mL  Mismatch volume: 12 mL  Mismatch ratio: Infinite    CTV Head/Neck - 10/17/20  IMPRESSION:    1. There is thrombosis within the dural venous sinuses at the superior sagittal sinus, right transverse sinus, sigmoid sinus, and upper jugular level.  2. No evidence for arterial stenosis, arterial occlusion, or evidence for dissection.  3. Amorphous density corresponds to the space interposed right occipital lobe and superior right cerebellar hemisphere probably representing amorphous thrombus within the region of the right transverse sinus although the possibility of hemorrhage   associated with venous infarction cannot be entirely excluded. Consider short-term follow-up 12-24 hours noncontrast head CT  as indicated.    CT Head w/o Contrast - 10/17/20  IMPRESSION:  1.  Hyperdensity corresponds to the superior sagittal sinus, and portions of the right transverse sinus and sigmoid sinus worrisome for thromboembolic occlusion of the dural venous sinuses. Consider follow-up MRI/MR venogram or CT venogram as indicated.   2.  No evidence for acute infarction.      Endovascular procedure: None     During daily rounds, the plan of care was discussed and developed with patient.  Plan of care includes: transition to warfarin with Lovenox bridge, follow up with INR clinic 2-3 days after discharge; Follow up with neurology and vascular medicine..    PHYSICAL EXAMINATION  Vital Signs:  B/P: 134/90, T: 98.3, P: 73, R: 16    General:  patient lying in bed without any acute distress     HEENT:  normocephalic/atraumatic  Cardio:  RRR  Pulmonary:  no respiratory distress  Abdomen:  soft, non-tender, non-distended, obese  Extremities:  no edema  Skin:  intact, warm/dry     Neurologic  Mental Status:  fully alert, attentive and oriented, follows commands, speech clear and fluent  Cranial Nerves:  PERRL, EOMI with normal smooth pursuit, facial sensation intact and symmetric, facial movements symmetric, hearing not formally tested but intact to conversation, palate elevation symmetric and uvula midline, no dysarthria, shoulder shrug strong bilaterally, tongue protrusion midline  Motor:  no abnormal movements, normal tone throughout, normal muscle bulk, no pronator drift, normal and symmetric rapid finger tapping, able to move all limbs spontaneously, strength 5/5 throughout upper and lower extremities  Reflexes:  2+ and symmetric throughout  Sensory:  intact/symmetric to light touch and pin prick throughout upper and lower extremities  Coordination:  FNF and HS intact without dysmetria  Station/Gait:  deferred    National Institutes of Health Stroke Scale (on day of discharge)  NIHSS Total Score: 0    Modified Delroy Scale (on day of  discharge): 0-No symptoms    Medications    Discharge Medication List as of 10/21/2020  1:10 PM      START taking these medications    Details   acetaZOLAMIDE (DIAMOX) 250 MG tablet Take 2 tablets (500 mg) by mouth 2 times daily, Disp-120 tablet, R-3, E-Prescribe      enoxaparin ANTICOAGULANT (LOVENOX) 120 MG/0.8ML syringe Inject 0.8 mLs (120 mg) Subcutaneous every 12 hours, Disp-60 Syringe, R-0, E-Prescribe      warfarin ANTICOAGULANT (COUMADIN) 5 MG tablet Take 1 tablet (5 mg) by mouth daily, Disp-30 tablet, R-3, E-Prescribe         CONTINUE these medications which have NOT CHANGED    Details   levothyroxine (SYNTHROID/LEVOTHROID) 112 MCG tablet Take 2 tablets (224 mcg) by mouth daily, Disp-120 tablet,R-2, E-PrescribePatient requests 60 day supply if insurance covers. Does not want 90 day at this time.             Additional recommendations and follow up:       MRV Brain wo Contrast     MRI Brain w & w/o contrast    Please perform infection protocol     INR     Inr Clinic Referral      NEUROLOGY ADULT REFERRAL      Vascular Medicine Referral      Reason for your hospital stay    You were hospitalized for Cerebral Venous Sinus Thrombosis.     Activity    Your activity upon discharge: activity as tolerated     Follow Up and recommended labs and tests    Anticoagulation Management:  Your Warfarin & INRs will be managed by your primary doctor, Dr Ismael Ribera at [facility].   Phone:  [phone]    Appointment scheduled on Thursday, October 22nd at 11am with Leonarda Egan NP at [facility].   Please arrive at 10:45am and call the clinic from the parking lot before going into the building.   They request you wear a mask.     This will be a post-hospital visit and to establish management of anticoagulation.  ______________________________________________________________________________     [facility] Follow-up and recommended labs and tests    Follow up with primary care provider, Ismael  Emeka Ribera, within 7 days for hospital follow- up.  No follow up labs or test are needed.    Follow up with INR Clinic within 2 days, to determine ongoing warfarin dose.  You will need an INR on October 23, 2020  Follow up with Dr. Earl, at the [facility], within 1 month for stroke follow up. The following labs/tests are recommended: MRI Brain, MRV.  Follow up with Vascular Medicine, Dr. Dickson, at [facility] for evaluation of hypercoagulability.     Appointments on [place] and/or [facility] (with [facility] or [facility] provider or service). Call [phone] if you haven't heard regarding these appointments within 7 days of discharge.     Diet    Follow this diet upon discharge: Orders Placed This Encounter      Regular Diet Adult       Patient was seen and discussed with the Attending, Dr. Raymond.    Miguel Rowley MD  Pager: [phone]    Physician Attestation   I, Braden Raymond, saw and evaluated this patient prior to discharge.  I discussed the patient with the resident/fellow and agree with plan of care as documented in the note.      I personally reviewed vital signs, medications, labs and imaging.    I personally spent 35 minutes on discharge activities.    Braden Raymond MD  Date of Service (when I saw the patient): 10/21/20

## 2020-10-22 NOTE — PROGRESS NOTES
SUBJECTIVE:                                                      Patient presents for Hospital Followup Visit:    Hospital:  [facility]      Date of Admission: 10/17/20  Date of Discharge: 10/21/20  Transitional Care Management Phone Call:   Reason(s) for Admission: Cerebral venous thrombosis            Problems taking medications regularly:  None       Medication changes since discharge: Blood thinner(has lost taste of liquids)       Problems adhering to non-medication therapy:  None  Summary of hospitalization:  [facility] discharge summary reviewed  Diagnostic Tests/Treatments reviewed.  Follow up needed: INR management  Other Healthcare Providers Involved in Patient s Care:         Neurology, OB/GYN, fertility specialist  Update since discharge: improved. Patient presents to this appointment by herself. Continues to improve but still has some tingling in her right fingertips. Also cannot taste things, but was told this was normal with the Diamox.  Was given Warfarin yesterday 7.5 mg around noon before leaving hospital then took 5 mg this morning around 8 am. Giving herself Lovenox injections and has noticed a fair amount of bruising.  Has moderate pain and bruising lower back where lumbar puncture was done. Wondering if it is okay to take Tylenol.  Also noticed that she started having postnasal drip and cold symptoms start this morning. Wondering if she can take OTC Dayquil/Nyquil for symptoms. Denies fever, chills, body aches.    ROS:  Constitutional, neuro, ENT, endocrine, pulmonary, cardiac, gastrointestinal, genitourinary, musculoskeletal, integument and psychiatric systems are negative, except as otherwise noted.    OBJECTIVE:                                                      GENERAL APPEARANCE: healthy, alert and no distress  EYES: Eyes grossly normal to inspection, conjunctivae and sclerae normal and lids and lashes normal  HENT: nose and mouth without ulcers or lesions and  normal cephalic/atraumatic  RESP: lungs clear to auscultation - no rales, rhonchi or wheezes  CV: regular rates and rhythm, normal S1 S2, no S3 or S4 and no murmur, click or rub  ABDOMEN: obese  MS: extremities normal- no gross deformities noted  SKIN: Dark ecchymosis midline low back (lumbar puncture site), scattered bruises on abdomen  NEURO: Normal strength and tone, mentation intact and speech normal  PSYCH: affect normal/bright    ASSESSMENT/PLAN:                                                      (G08) Cerebral venous thrombosis of cortical vein  (primary encounter diagnosis)  Comment: Hospital follow-up, improving  Plan: Continue medications including Diamox, Lovenox injections, Warfarin. Will check INR tomorrow, Friday 10/23 and adjust if needed with repeat INR Monday 10/26.  DO NOT start combination oral contraceptive at this time. Will reach out to OB/GYN to discuss alternatives    (E66.01) Morbid obesity (H)  Comment: Currently doing keto diet for weight loss and is successfully losing weight  Plan: Continue keto diet        Post Discharge Medication Reconciliation: discharge medications reconciled, continue medications without change.  Issues to address: Issues identified were:   medication - contraception, INR management with new Warfarin Rx.  Plan of care communicated with patient      Type of Medical Decision Making Face-to-Face Visit within 7 Days Face-to-Face Visit within 14 days   Moderate Complexity [id number]   High Complexity [phone] [id number]

## 2020-10-22 NOTE — CONSULTS
10/20/20 1246 Afua Jameson, SARINA     Stroke Education Note     The following information has been reviewed with the patient:     1. Warning signs of stroke     2. Calling 911 if having warning signs of stroke     3. All modifiable risk factors: hypertension, CAD, atrial fib, diabetes, hypercholesterolemia, smoking, substance abuse, diet, physical inactivity, obesity, sleep apnea.     4. Patient's risk factors for stroke which include: Obesity     5. Follow-up plan for after discharge     6. Discharge medications which include: Warfarin     In addition, the [facility] Stroke Class Handout has been given to the patient.     Learner's response to risk factors / lifestyle modification education: Desire to change Committment to change Activation   Literature given: Guide to Warfarin Therapy and Warfarin Therapy Calendar      Afua Jameson RN, RN

## 2020-10-23 VITALS — DIASTOLIC BLOOD PRESSURE: 82 MMHG | TEMPERATURE: 98.1 F | SYSTOLIC BLOOD PRESSURE: 116 MMHG

## 2020-10-23 DIAGNOSIS — Z79.01 LONG TERM CURRENT USE OF ANTICOAGULANT THERAPY: ICD-10-CM

## 2020-10-23 DIAGNOSIS — Z79.01 ANTICOAGULATION MONITORING, INR RANGE 2-3: ICD-10-CM

## 2020-10-23 DIAGNOSIS — G08 CEREBRAL VENOUS THROMBOSIS OF CORTICAL VEIN: Primary | ICD-10-CM

## 2020-10-23 LAB
ALB CSF/SERPL: 16 RATIO (ref 0–9)
ALBUMIN CSF-MCNC: 50 MG/DL (ref 0–35)
ALBUMIN SERPL-MCNC: 3120 MG/DL (ref 3500–5200)
IGG CSF-MCNC: 10.8 MG/DL (ref 0–6)
IGG SERPL-MCNC: 1120 MG/DL (ref 768–1632)
IGG SYNTH RATE SER+CSF CALC-MRATE: 10.7 MG/D
IGG/ALB CLEAR SER+CSF-RTO: 0.6 RATIO (ref 0.28–0.66)
IGG/ALB CSF: 0.22 RATIO (ref 0.09–0.25)
INR PPP: 1.1 (ref 2–3)
OLIGOCLONAL BANDS CSF ELPH-IMP: ABNORMAL
OLIGOCLONAL BANDS CSF ELPH-IMP: NEGATIVE
OLIGOCLONAL BANDS CSF IEF: 0 BANDS (ref 0–1)

## 2020-10-23 PROCEDURE — 99212 OFFICE O/P EST SF 10 MIN: CPT | Performed by: NURSE PRACTITIONER

## 2020-10-23 PROCEDURE — 85610 PROTHROMBIN TIME: CPT | Performed by: NURSE PRACTITIONER

## 2020-10-23 PROCEDURE — 36416 COLLJ CAPILLARY BLOOD SPEC: CPT | Performed by: NURSE PRACTITIONER

## 2020-10-23 NOTE — PROGRESS NOTES
Problem(s) Oriented visit        SUBJECTIVE:                                                    Paulina Diana is a 40 year old female who presents to clinic today for the following health issues :    CC: Warfarin monitoring    Anticoagulated with warfarin for recent cerebral venous thrombosis. Taking 5 mg daily. Goal INR 2-3, with ideal goal 2.5. Today's INR is 1.1. No missed doses but has not taken today's dose yet. Switching to 6 pm for medication. No changes in diet, medications, or alcohol. No recent illnesses. No signs or symptoms of bleeding or clotting.    Would like bruising on lower back checked.      Problem list, Medication list, Allergies, and Medical/Social/Surgical histories reviewed in [facility] and updated as appropriate.   Additional history: as documented    ROS:  Gen, heme negative except as listed per HPI    Histories:   Patient Active Problem List   Diagnosis     Atypical nevus     Acquired hypothyroidism     Dermatofibroma of face     Melanocytic nevus, unspecified location     Morbid obesity (H)     Cerebral venous thrombosis of cortical vein     Long term current use of anticoagulant therapy     Anticoagulation monitoring, INR range 2-3     Past Surgical History:   Procedure Laterality Date     DILATION AND CURETTAGE SUCTION N/A 5/27/2016    Procedure: DILATION AND CURETTAGE SUCTION;  Surgeon: Natacha Goyal MD;  Location: [facility]     ENT SURGERY      septal repair     LAPAROSCOPIC EVACUATION ECTOPIC PREGNANCY Left 1/28/2018    Procedure: LAPAROSCOPIC EVACUATION ECTOPIC PREGNANCY;  LAPAROSCOPIC SINGLE SITE EVACUATION OF ECTOPIC PREGNANCY, LEFT SALPINGECTOMY;  Surgeon: Erum Boles MD;  Location:  OR     LAPAROSCOPIC SALPINGECTOMY Left 1/28/2018    Procedure: LAPAROSCOPIC SALPINGECTOMY;;  Surgeon: Erum Boles MD;  Location:  OR     LAPAROSCOPIC SALPINGO-OOPHORECTOMY  11/2/2013    Procedure: LAPAROSCOPIC SALPINGO-OOPHORECTOMY;  Single port laparoscopic, Evacuation of  hemo-peritonium and Products of Conception;  Surgeon: Peggy Thornton MD;  Location: [facility] OR     NOSE SURGERY      age 12       Social History     Tobacco Use     Smoking status: Never Smoker     Smokeless tobacco: Never Used     Tobacco comment:  on occasion   Substance Use Topics     Alcohol use: No     Alcohol/week: 0.0 standard drinks     Comment: rare     History reviewed. No pertinent family history.      Current Outpatient Medications   Medication Sig Dispense Refill     acetaZOLAMIDE (DIAMOX) 250 MG tablet Take 2 tablets (500 mg) by mouth 2 times daily 120 tablet 3     enoxaparin ANTICOAGULANT (LOVENOX) 120 MG/0.8ML syringe Inject 0.8 mLs (120 mg) Subcutaneous every 12 hours 60 Syringe 0     levothyroxine (SYNTHROID/LEVOTHROID) 112 MCG tablet Take 2 tablets (224 mcg) by mouth daily 120 tablet 2     warfarin ANTICOAGULANT (COUMADIN) 5 MG tablet Take 1 tablet (5 mg) by mouth daily 30 tablet 3       OBJECTIVE:                                                    Physical Exam:    /82   Temp 98.1  F (36.7  C) (Temporal)   LMP 10/20/2020 (Exact Date)     CONSTITUTIONAL: Alert non-toxic appearing female in no acute distress  RESPIRATORY: Respirations unlabored  NEUROLOGIC: No gross deficits, normal gait  SKIN: ecchymosis midline lower back, improving  PSYCHIATRIC: Pleasant and interactive, affect bright, makes appropriate eye contact, thought process logical       ASSESSMENT/PLAN:                                                        Paulina was seen today for inr followup.    Diagnoses and all orders for this visit:    Cerebral venous thrombosis of cortical vein  -     Cancel: Prothrombin - INR ([facility]); Standing  -     Prothrombin - INR ([facility])  -     Prothrombin - INR ([facility]); Standing    Long term current use of anticoagulant therapy  -     Cancel: Prothrombin - INR ([facility]); Standing  -     Prothrombin - INR ([facility])  -     Prothrombin - INR ([facility]); Standing    Anticoagulation monitoring, INR range 2-3  -      Cancel: Prothrombin - INR ([facility]); Standing  -     Prothrombin - INR ([facility])  -     Prothrombin - INR ([facility]); Standing    Continue Lovenox injections until INR within range.  Increasing Warfarin to 7.5 mg today, tomorrow, Sunday with INR recheck Monday.    The following health maintenance items are reviewed in Epic and correct as of today:  Health Maintenance   Topic Date Due     PREVENTIVE CARE VISIT  1980     EYE EXAM  1980     PAP  04/01/2018     INFLUENZA VACCINE (1) 09/01/2020     DTAP/TDAP/TD IMMUNIZATION (3 - Td) 09/04/2022     COLORECTAL CANCER SCREENING  10/20/2026     PHQ-2  Completed     Pneumococcal Vaccine: Pediatrics (0 to 5 Years) and At-Risk Patients (6 to 64 Years)  Aged Out     IPV IMMUNIZATION  Aged Out     MENINGITIS IMMUNIZATION  Aged Out     HEPATITIS B IMMUNIZATION  Aged Out     HIV SCREENING  Discontinued       FUTURE APPOINTMENTS:       - Follow-up visit in 3 days for INR    ABDON Ramirez CNP  [facility]  Family Practice  [facility]  [phone]    For any issues my office # is [phone]

## 2020-10-24 LAB
B BURGDOR DNA SPEC QL NAA+PROBE: NOT DETECTED
DIAGNOSTIC IMP SPEC-IMP: NOT DETECTED
EBV DNA # SPEC NAA+PROBE: <390 CPY/ML
EBV DNA SPEC NAA+PROBE-LOG#: <2.6 LOG
SPECIMEN SOURCE: NORMAL
SPECIMEN SOURCE: NORMAL

## 2020-10-25 LAB
BACTERIA SPEC CULT: NO GROWTH
SPECIMEN SOURCE: NORMAL

## 2020-10-26 DIAGNOSIS — Z79.01 ANTICOAGULATION MONITORING, INR RANGE 2-3: ICD-10-CM

## 2020-10-26 DIAGNOSIS — G08 CEREBRAL VENOUS THROMBOSIS OF CORTICAL VEIN: ICD-10-CM

## 2020-10-26 DIAGNOSIS — Z79.01 LONG TERM CURRENT USE OF ANTICOAGULANT THERAPY: ICD-10-CM

## 2020-10-26 LAB — INR PPP: 1.8 (ref 2–3)

## 2020-10-26 PROCEDURE — 36416 COLLJ CAPILLARY BLOOD SPEC: CPT | Performed by: NURSE PRACTITIONER

## 2020-10-26 PROCEDURE — 85610 PROTHROMBIN TIME: CPT | Performed by: NURSE PRACTITIONER

## 2020-10-28 ENCOUNTER — DOCUMENTATION ONLY (OUTPATIENT)
Dept: CARE COORDINATION | Facility: CLINIC | Age: 40
End: 2020-10-28

## 2020-10-29 ENCOUNTER — OFFICE VISIT (OUTPATIENT)
Dept: FAMILY MEDICINE | Facility: CLINIC | Age: 40
End: 2020-10-29

## 2020-10-29 VITALS
WEIGHT: 253.6 LBS | BODY MASS INDEX: 40.93 KG/M2 | SYSTOLIC BLOOD PRESSURE: 104 MMHG | DIASTOLIC BLOOD PRESSURE: 62 MMHG | TEMPERATURE: 97.8 F | RESPIRATION RATE: 16 BRPM | HEART RATE: 72 BPM

## 2020-10-29 DIAGNOSIS — Z79.01 LONG TERM CURRENT USE OF ANTICOAGULANT THERAPY: ICD-10-CM

## 2020-10-29 DIAGNOSIS — G08 CEREBRAL VENOUS THROMBOSIS OF CORTICAL VEIN: ICD-10-CM

## 2020-10-29 DIAGNOSIS — Z79.01 ANTICOAGULATION MONITORING, INR RANGE 2-3: ICD-10-CM

## 2020-10-29 LAB — INR PPP: 2 (ref 2–3)

## 2020-10-29 PROCEDURE — 85610 PROTHROMBIN TIME: CPT | Performed by: NURSE PRACTITIONER

## 2020-10-29 PROCEDURE — 99213 OFFICE O/P EST LOW 20 MIN: CPT | Performed by: NURSE PRACTITIONER

## 2020-10-29 PROCEDURE — 36416 COLLJ CAPILLARY BLOOD SPEC: CPT | Performed by: NURSE PRACTITIONER

## 2020-10-29 NOTE — PATIENT INSTRUCTIONS
STOP the Lovenox!   Take warfarin 7.5mg five days per week (M/W/F/S/Su) and take warfarin 5mg two days per week (T/Th)  Return in one week for recheck INR and visit with Mikaela or Bonnie    You can take Tylenol sparingly for the pain

## 2020-10-29 NOTE — PROGRESS NOTES
Problem(s) Oriented visit        SUBJECTIVE:                                                    Paulina Diana is a 40 year old female who presents to clinic today for the following health issues :    CC: Cerebral sinus venous thrombosis    Severe headaches, admitted 10/17-10/21 for cerebral sinus venous thrombosis. Discharged home on warfarin, Lovenox, and Diamox. Generally doing well, but having some discomfort at sites of Lovenox injections. She also had to episodes of sharp but generally mild headache that began at her left temple, gradually traveled across her forehead to her R temple, and resolved in about 10 minutes- occurred twice yesterday, once today. No neurologic deficits with these headaches, no vomiting, and she does not think these are similar to the headaches she initially presented with. Also notes that everything she drinks tastes like lemon, though this is thought to be due to Diamox. Will be following up with vascular medicine this next month and neurologist Dr. Earl in December.    Regarding anticoagulation, she has been taking warfarin 5-7.5mg. 47.5mg total this past week. Goal INR 2-3, today's INR is 2. No missed doses. No changes in diet, medications, or alcohol. No recent illnesses. No signs or symptoms of bleeding or clotting.      Problem list, Medication list, Allergies, and Medical/Social/Surgical histories reviewed in EPIC and updated as appropriate.   Additional history: as documented    ROS:  Gen, CV, resp, neuro, heme negative except as listed per HPI    Histories:   Patient Active Problem List   Diagnosis     Atypical nevus     Acquired hypothyroidism     Dermatofibroma of face     Melanocytic nevus, unspecified location     Morbid obesity (H)     Cerebral venous thrombosis of cortical vein     Long term current use of anticoagulant therapy     Anticoagulation monitoring, INR range 2-3     Past Surgical History:   Procedure Laterality Date     DILATION AND CURETTAGE SUCTION N/A  5/27/2016    Procedure: DILATION AND CURETTAGE SUCTION;  Surgeon: Natacha Goyal MD;  Location: [facility]     ENT SURGERY      septal repair     LAPAROSCOPIC EVACUATION ECTOPIC PREGNANCY Left 1/28/2018    Procedure: LAPAROSCOPIC EVACUATION ECTOPIC PREGNANCY;  LAPAROSCOPIC SINGLE SITE EVACUATION OF ECTOPIC PREGNANCY, LEFT SALPINGECTOMY;  Surgeon: Erum Boles MD;  Location:  OR     LAPAROSCOPIC SALPINGECTOMY Left 1/28/2018    Procedure: LAPAROSCOPIC SALPINGECTOMY;;  Surgeon: Erum Boles MD;  Location:  OR     LAPAROSCOPIC SALPINGO-OOPHORECTOMY  11/2/2013    Procedure: LAPAROSCOPIC SALPINGO-OOPHORECTOMY;  Single port laparoscopic, Evacuation of hemo-peritonium and Products of Conception;  Surgeon: Peggy Thornton MD;  Location:  OR     NOSE SURGERY      age 12       Social History     Tobacco Use     Smoking status: Never Smoker     Smokeless tobacco: Never Used     Tobacco comment:  on occasion   Substance Use Topics     Alcohol use: No     Alcohol/week: 0.0 standard drinks     Comment: rare     No family history on file.      Current Outpatient Medications   Medication Sig Dispense Refill     acetaZOLAMIDE (DIAMOX) 250 MG tablet Take 2 tablets (500 mg) by mouth 2 times daily 120 tablet 3     enoxaparin ANTICOAGULANT (LOVENOX) 120 MG/0.8ML syringe Inject 0.8 mLs (120 mg) Subcutaneous every 12 hours 14 Syringe 0     levothyroxine (SYNTHROID/LEVOTHROID) 112 MCG tablet Take 2 tablets (224 mcg) by mouth daily 120 tablet 2     warfarin ANTICOAGULANT (COUMADIN) 5 MG tablet Take 1 tablet (5 mg) by mouth daily 30 tablet 3       OBJECTIVE:                                                    Physical Exam:    /62   Pulse 72   Temp 97.8  F (36.6  C) (Temporal)   Resp 16   Wt 115 kg (253 lb 9.6 oz)   LMP 10/20/2020 (Exact Date)   BMI 40.93 kg/m      CONSTITUTIONAL: Alert non-toxic appearing female in no acute distress  HEAD: Normocephalic, atraumatic  EYES: PERRLA; no scleral  icterus; sclera without injection; EOMI  RESPIRATORY: Lungs clear to auscultation, respirations unlabored  CV: Regular rate and rhythm, S1S2, no clicks, murmurs, rubs, or gallops; bilateral lower extremities without edema, posterior tibialis pulses 2+ bilaterally  GASTROINTESTINAL: Normoactive bowel sounds x4 quadrants, abdomen soft, non-distended, and non-tender to palpation without masses or organomegaly  MUSCULOSKELETAL: Moves all extremities, no obvious muscle wasting  NEUROLOGIC: CN II-XII intact, HINTS negative, rapid alternating movements intact, Romberg negative, normal gait  SKIN: Appropriate color for race, warm and dry, no suspicious lesions or rashes; scattered ecchymoses to abdomen  PSYCHIATRIC: Pleasant and interactive, affect euthymic, makes appropriate eye contact, thought process logical       ASSESSMENT/PLAN:                                                        Paulina was seen today for inr followup and headache.    Diagnoses and all orders for this visit:    Cerebral venous thrombosis of cortical vein  Long term current use of anticoagulant therapy  Anticoagulation monitoring, INR range 2-3  -     Prothrombin - INR ([facility])    INR 2- may stop Lovenox. Continue warfarin 7.5mg 5x per week, 5mg two days per week. Recheck in one week. Headaches are generally mild without neurologic deficit- may take Tylenol sparingly but to be seen should her headaches worsen, become more frequent, or if they are associated with neurologic changes and/or vomiting. Message sent to her neurologist for update as well.      Patient needs assistance with ADLs: none identified today  Patient needs assistance with iADLs: none identified today    The following health maintenance items are reviewed in Epic and correct as of today:  Health Maintenance   Topic Date Due     PREVENTIVE CARE VISIT  1982     EYE EXAM  1982     HEPATITIS C SCREENING  06/03/2000     PAP  04/01/2018     INFLUENZA VACCINE (1) 09/01/2020      DTAP/TDAP/TD IMMUNIZATION (3 - Td) 09/04/2022     COLORECTAL CANCER SCREENING  10/20/2026     PHQ-2  Completed     Pneumococcal Vaccine: Pediatrics (0 to 5 Years) and At-Risk Patients (6 to 64 Years)  Aged Out     IPV IMMUNIZATION  Aged Out     MENINGITIS IMMUNIZATION  Aged Out     HEPATITIS B IMMUNIZATION  Aged Out     HIV SCREENING  Discontinued       Patient Instructions   STOP the Lovenox!   Take warfarin 7.5mg five days per week (M/W/F/S/Su) and take warfarin 5mg two days per week (T/Th)  Return in one week for recheck INR and visit with Mikaela or Bonnie    You can take Tylenol sparingly for the pain      ABDON Hodges CNP  [facility]  Family Practice  [facility]  [phone]    For any issues my office # is [phone]

## 2020-11-09 VITALS
WEIGHT: 248.4 LBS | SYSTOLIC BLOOD PRESSURE: 104 MMHG | BODY MASS INDEX: 40.09 KG/M2 | DIASTOLIC BLOOD PRESSURE: 76 MMHG | TEMPERATURE: 97.9 F

## 2020-11-09 DIAGNOSIS — Z79.01 LONG TERM CURRENT USE OF ANTICOAGULANT THERAPY: ICD-10-CM

## 2020-11-09 DIAGNOSIS — Z79.01 ANTICOAGULATION MONITORING, INR RANGE 2-3: ICD-10-CM

## 2020-11-09 DIAGNOSIS — G08 CEREBRAL VENOUS THROMBOSIS OF CORTICAL VEIN: ICD-10-CM

## 2020-11-09 LAB — INR PPP: 1.9 (ref 2–3)

## 2020-11-09 PROCEDURE — 36416 COLLJ CAPILLARY BLOOD SPEC: CPT | Performed by: NURSE PRACTITIONER

## 2020-11-09 PROCEDURE — 85610 PROTHROMBIN TIME: CPT | Performed by: NURSE PRACTITIONER

## 2020-11-09 PROCEDURE — 99213 OFFICE O/P EST LOW 20 MIN: CPT | Performed by: NURSE PRACTITIONER

## 2020-11-09 RX ORDER — WARFARIN SODIUM 5 MG/1
5-7.5 TABLET ORAL DAILY
Qty: 100 TABLET | Refills: 3 | Status: SHIPPED | OUTPATIENT
Start: 2020-11-09 | End: 2021-01-11

## 2020-11-09 NOTE — PATIENT INSTRUCTIONS
Change warfarin to 7.5mg six days per week, 5mg once per week  Recheck in one week    Biotene spray!  Mikaela will talk with Dr. Earl    General Information:     Today you had your appointment with Mikaela Jackman CNP  My hours are:     Monday 8 AM - 5 PM  Tuesday: 8 AM - 5 PM  Wednesday: 8 AM - 5 PM  Thursday: 8 AM - 5 PM     I am not in the office Fridays. Therefore, non urgent calls received on Friday will be addressed when I am back in the office on Monday.      If lab work was done today as part of your evaluation you will generally be contacted via My Chart, mail, or phone with the results within 1-5 days. If there is an alarming result we will contact you by phone. Lab results come back at varying times- I generally wait until all labs are resulted before making comments on results. Please note labs are automatically released to My Chart once available.      If you need refills please contact your pharmacy. They will send a refill request to me to review. Please allow 3 business days for us to process all refill requests.      Please call or send a medical message with any questions or concerns    We are working to improve our digital reputation.  Would you please help us by reviewing our clinic on Google and/or Facebook?  These are links for filling out a review for the clinic:    [email or web address]                [email or web address]    We truly appreciate you taking the time to do this.

## 2020-11-09 NOTE — PROGRESS NOTES
Problem(s) Oriented visit        SUBJECTIVE:                                                    Paulina Diana is a 40 year old female who presents to clinic today for the following health issues :    CC: INR monitoring    Doing well this past week. Taking warfarin 7.5mg x5 days per week and 5mg x2 days per week for cerebral venous sinus thrombosis. Goal INR 2-3, today's INR is 1.9. No missed doses. No changes in diet, medications, or alcohol. No recent illnesses. No signs or symptoms of bleeding or clotting. Headaches from last week have improved. Diamox is causing dry mouth and causing taste changes, wondering if she needs to stay on this. Will be following up with vascular medicine this next month and neurologist Dr. Earl in December.      Problem list, Medication list, Allergies, and Medical/Social/Surgical histories reviewed in EPIC and updated as appropriate.   Additional history: as documented    ROS:  Gen, CV, resp, neuro, heme negative except as listed per HPI    Histories:   Patient Active Problem List   Diagnosis     Atypical nevus     Acquired hypothyroidism     Dermatofibroma of face     Melanocytic nevus, unspecified location     Morbid obesity (H)     Cerebral venous thrombosis of cortical vein     Long term current use of anticoagulant therapy     Anticoagulation monitoring, INR range 2-3     Past Surgical History:   Procedure Laterality Date     DILATION AND CURETTAGE SUCTION N/A 5/27/2016    Procedure: DILATION AND CURETTAGE SUCTION;  Surgeon: Natacha Goyal MD;  Location: [facility]     ENT SURGERY      septal repair     LAPAROSCOPIC EVACUATION ECTOPIC PREGNANCY Left 1/28/2018    Procedure: LAPAROSCOPIC EVACUATION ECTOPIC PREGNANCY;  LAPAROSCOPIC SINGLE SITE EVACUATION OF ECTOPIC PREGNANCY, LEFT SALPINGECTOMY;  Surgeon: Erum Boles MD;  Location:  OR     LAPAROSCOPIC SALPINGECTOMY Left 1/28/2018    Procedure: LAPAROSCOPIC SALPINGECTOMY;;  Surgeon: Erum Boles MD;   Location: [facility] OR     LAPAROSCOPIC SALPINGO-OOPHORECTOMY  11/2/2013    Procedure: LAPAROSCOPIC SALPINGO-OOPHORECTOMY;  Single port laparoscopic, Evacuation of hemo-peritonium and Products of Conception;  Surgeon: Peggy Thornton MD;  Location: [facility] OR     NOSE SURGERY      age 12       Social History     Tobacco Use     Smoking status: Never Smoker     Smokeless tobacco: Never Used     Tobacco comment:  on occasion   Substance Use Topics     Alcohol use: No     Alcohol/week: 0.0 standard drinks     Comment: rare     History reviewed. No pertinent family history.      Current Outpatient Medications   Medication Sig Dispense Refill     warfarin ANTICOAGULANT (COUMADIN) 5 MG tablet Take 1-1.5 tablets (5-7.5 mg) by mouth daily 7.5mg six days per week, 5mg one day per week 100 tablet 3     acetaZOLAMIDE (DIAMOX) 250 MG tablet Take 2 tablets (500 mg) by mouth 2 times daily 120 tablet 3     levothyroxine (SYNTHROID/LEVOTHROID) 112 MCG tablet Take 2 tablets (224 mcg) by mouth daily 120 tablet 2       OBJECTIVE:                                                    Physical Exam:    /76   Temp 97.9  F (36.6  C) (Temporal)   Wt 112.7 kg (248 lb 6.4 oz)   LMP 10/20/2020 (Exact Date)   BMI 40.09 kg/m      CONSTITUTIONAL: Alert non-toxic appearing female in no acute distress  NEUROLOGIC: no gross deficits, normal gait  SKIN: Appropriate color for race, warm and dry, no suspicious lesions or rashes to unclothed skin  PSYCHIATRIC: Pleasant and interactive, affect euthymic, makes appropriate eye contact, thought process logical       ASSESSMENT/PLAN:                                                        Paulina was seen today for inr followup and headache.    Diagnoses and all orders for this visit:    Cerebral venous thrombosis of cortical vein  Long term current use of anticoagulant therapy  Anticoagulation monitoring, INR range 2-3  -     Prothrombin - INR ([facility])    Subtherapeutic at 1.9. Continue warfarin 7.5mg 6x per  week, 5mg once per week. Recheck in one week.       Patient needs assistance with ADLs: none identified today  Patient needs assistance with iADLs: none identified today    The following health maintenance items are reviewed in Epic and correct as of today:  Health Maintenance   Topic Date Due     PREVENTIVE CARE VISIT  1982     EYE EXAM  1982     HEPATITIS C SCREENING  06/03/2000     PAP  04/01/2018     DTAP/TDAP/TD IMMUNIZATION (3 - Td) 09/04/2022     COLORECTAL CANCER SCREENING  10/20/2026     PHQ-2  Completed     INFLUENZA VACCINE  Addressed     Pneumococcal Vaccine: Pediatrics (0 to 5 Years) and At-Risk Patients (6 to 64 Years)  Aged Out     IPV IMMUNIZATION  Aged Out     MENINGITIS IMMUNIZATION  Aged Out     HEPATITIS B IMMUNIZATION  Aged Out     HIV SCREENING  Discontinued       Patient Instructions   Change warfarin to 7.5mg six days per week, 5mg once per week  Recheck in one week    Biotene spray!  Mikaela will talk with Dr. Earl    General Information:     Today you had your appointment with Mikaela Jackman CNP  My hours are:     Monday 8 AM - 5 PM  Tuesday: 8 AM - 5 PM  Wednesday: 8 AM - 5 PM  Thursday: 8 AM - 5 PM     I am not in the office Fridays. Therefore, non urgent calls received on Friday will be addressed when I am back in the office on Monday.      If lab work was done today as part of your evaluation you will generally be contacted via My Chart, mail, or phone with the results within 1-5 days. If there is an alarming result we will contact you by phone. Lab results come back at varying times- I generally wait until all labs are resulted before making comments on results. Please note labs are automatically released to My Chart once available.      If you need refills please contact your pharmacy. They will send a refill request to me to review. Please allow 3 business days for us to process all refill requests.      Please call or send a medical message with any questions or  concerns    We are working to improve our digital reputation.  Would you please help us by reviewing our clinic on Google and/or Facebook?  These are links for filling out a review for the clinic:    [email or web address]    We truly appreciate you taking the time to do this.        ABDON Hodges CNP  [facility]  Family Practice  [facility]  [phone]    For any issues my office # is [phone]

## 2020-11-09 NOTE — PROGRESS NOTES
Problem(s) Oriented visit        SUBJECTIVE:                                                    Paulina Diana is a 40 year old female who presents to clinic today for the following health issues :    CC: Warfarin monitoring    Anticoagulated with warfarin for ***. Taking ***mg ***. Goal INR 2***-3***. Today's INR is ***. No missed doses. No changes in diet, medications, or alcohol. No recent illnesses. No signs or symptoms of bleeding or clotting.      Problem list, Medication list, Allergies, and Medical/Social/Surgical histories reviewed in [facility] and updated as appropriate.   Additional history: as documented    ROS:  Gen, heme negative except as listed per HPI    Histories:   Patient Active Problem List   Diagnosis     Atypical nevus     Acquired hypothyroidism     Dermatofibroma of face     Melanocytic nevus, unspecified location     Morbid obesity (H)     Cerebral venous thrombosis of cortical vein     Long term current use of anticoagulant therapy     Anticoagulation monitoring, INR range 2-3     Past Surgical History:   Procedure Laterality Date     DILATION AND CURETTAGE SUCTION N/A 5/27/2016    Procedure: DILATION AND CURETTAGE SUCTION;  Surgeon: aNtacha Goyal MD;  Location: [facility]     ENT SURGERY      septal repair     LAPAROSCOPIC EVACUATION ECTOPIC PREGNANCY Left 1/28/2018    Procedure: LAPAROSCOPIC EVACUATION ECTOPIC PREGNANCY;  LAPAROSCOPIC SINGLE SITE EVACUATION OF ECTOPIC PREGNANCY, LEFT SALPINGECTOMY;  Surgeon: Erum Boles MD;  Location:  OR     LAPAROSCOPIC SALPINGECTOMY Left 1/28/2018    Procedure: LAPAROSCOPIC SALPINGECTOMY;;  Surgeon: Erum Boles MD;  Location:  OR     LAPAROSCOPIC SALPINGO-OOPHORECTOMY  11/2/2013    Procedure: LAPAROSCOPIC SALPINGO-OOPHORECTOMY;  Single port laparoscopic, Evacuation of hemo-peritonium and Products of Conception;  Surgeon: Peggy Thornton MD;  Location:  OR     NOSE SURGERY      age 12       Social History     Tobacco Use      Smoking status: Never Smoker     Smokeless tobacco: Never Used     Tobacco comment:  on occasion   Substance Use Topics     Alcohol use: No     Alcohol/week: 0.0 standard drinks     Comment: rare     History reviewed. No pertinent family history.      Current Outpatient Medications   Medication Sig Dispense Refill     acetaZOLAMIDE (DIAMOX) 250 MG tablet Take 2 tablets (500 mg) by mouth 2 times daily 120 tablet 3     enoxaparin ANTICOAGULANT (LOVENOX) 120 MG/0.8ML syringe Inject 0.8 mLs (120 mg) Subcutaneous every 12 hours 14 Syringe 0     levothyroxine (SYNTHROID/LEVOTHROID) 112 MCG tablet Take 2 tablets (224 mcg) by mouth daily 120 tablet 2     warfarin ANTICOAGULANT (COUMADIN) 5 MG tablet Take 1 tablet (5 mg) by mouth daily 30 tablet 3       OBJECTIVE:                                                    Physical Exam:    Temp 97.9  F (36.6  C) (Temporal)   Wt 112.7 kg (248 lb 6.4 oz)   LMP 10/20/2020 (Exact Date)   BMI 40.09 kg/m      CONSTITUTIONAL: Alert non-toxic appearing ***male in no acute distress  RESPIRATORY: Respirations unlabored  NEUROLOGIC: No gross deficits, normal gait  SKIN: Appropriate color for race, warm and dry, no rashes or lesions to unclothed skin  PSYCHIATRIC: Pleasant and interactive, affect bright, makes appropriate eye contact, thought process logical       ASSESSMENT/PLAN:                                                        Paulina was seen today for inr followup.    Diagnoses and all orders for this visit:    Cerebral venous thrombosis of cortical vein  -     Prothrombin - INR ([facility])    Long term current use of anticoagulant therapy  -     Prothrombin - INR ([facility])    Anticoagulation monitoring, INR range 2-3  -     Prothrombin - INR ([facility])          Patient needs assistance with ADLs: {ADL list:597619}  Patient needs assistance with iADLs: {iADL list:774406}    The following health maintenance items are reviewed in Epic and correct as of today:  Health Maintenance   Topic Date Due      PREVENTIVE CARE VISIT  1982     EYE EXAM  1982     HEPATITIS C SCREENING  06/03/2000     PAP  04/01/2018     INFLUENZA VACCINE (1) 09/01/2020     DTAP/TDAP/TD IMMUNIZATION (3 - Td) 09/04/2022     COLORECTAL CANCER SCREENING  10/20/2026     PHQ-2  Completed     Pneumococcal Vaccine: Pediatrics (0 to 5 Years) and At-Risk Patients (6 to 64 Years)  Aged Out     IPV IMMUNIZATION  Aged Out     MENINGITIS IMMUNIZATION  Aged Out     HEPATITIS B IMMUNIZATION  Aged Out     HIV SCREENING  Discontinued       {FOLLOW UP:354890}    ABDON Ramirez CNP  [facility]  Family Practice  [facility]  [phone]    For any issues my office # is [phone]

## 2020-11-16 ENCOUNTER — OFFICE VISIT (OUTPATIENT)
Dept: FAMILY MEDICINE | Facility: CLINIC | Age: 40
End: 2020-11-16

## 2020-11-16 VITALS
WEIGHT: 246 LBS | DIASTOLIC BLOOD PRESSURE: 74 MMHG | HEART RATE: 78 BPM | HEIGHT: 66 IN | SYSTOLIC BLOOD PRESSURE: 112 MMHG | RESPIRATION RATE: 16 BRPM | OXYGEN SATURATION: 99 % | BODY MASS INDEX: 39.53 KG/M2 | TEMPERATURE: 98.7 F

## 2020-11-16 DIAGNOSIS — R20.2 NUMBNESS AND TINGLING OF BOTH FEET: ICD-10-CM

## 2020-11-16 DIAGNOSIS — Z79.01 ANTICOAGULATION MONITORING, INR RANGE 2-3: ICD-10-CM

## 2020-11-16 DIAGNOSIS — R20.0 NUMBNESS AND TINGLING IN BOTH HANDS: ICD-10-CM

## 2020-11-16 DIAGNOSIS — R43.2 ALTERED TASTE: ICD-10-CM

## 2020-11-16 DIAGNOSIS — R20.0 NUMBNESS AND TINGLING OF BOTH FEET: ICD-10-CM

## 2020-11-16 DIAGNOSIS — Z79.01 LONG TERM CURRENT USE OF ANTICOAGULANT THERAPY: ICD-10-CM

## 2020-11-16 DIAGNOSIS — G08 CEREBRAL VENOUS THROMBOSIS OF CORTICAL VEIN: Primary | ICD-10-CM

## 2020-11-16 DIAGNOSIS — R20.2 NUMBNESS AND TINGLING IN BOTH HANDS: ICD-10-CM

## 2020-11-16 LAB — INR PPP: 2.9 (ref 2–3)

## 2020-11-16 PROCEDURE — 85610 PROTHROMBIN TIME: CPT | Performed by: NURSE PRACTITIONER

## 2020-11-16 PROCEDURE — 36416 COLLJ CAPILLARY BLOOD SPEC: CPT | Performed by: NURSE PRACTITIONER

## 2020-11-16 PROCEDURE — 99213 OFFICE O/P EST LOW 20 MIN: CPT | Performed by: NURSE PRACTITIONER

## 2020-11-16 RX ORDER — ACETAZOLAMIDE 250 MG/1
500 TABLET ORAL 2 TIMES DAILY
Qty: 120 TABLET | Refills: 3 | Status: SHIPPED | OUTPATIENT
Start: 2020-11-16 | End: 2021-07-23

## 2020-11-16 ASSESSMENT — MIFFLIN-ST. JEOR: SCORE: 1812.6

## 2020-11-16 NOTE — PATIENT INSTRUCTIONS
Take 5 mg Monday, Thursday   Take 7.5 mg all other days  Next INR 1 week (already scheduled with Mikaela)    I will do some messaging/research into your vascular appointment and ask Dr. Earl about the Diamox

## 2020-11-16 NOTE — PROGRESS NOTES
Problem(s) Oriented visit        SUBJECTIVE:                                                    Paulina Diana is a 40 year old female who presents to clinic today for the following health issues :    CC: Warfarin monitoring    Anticoagulated with warfarin for cerebral venous thrombosis. Taking 7.5 mg all except 1 day where she is taking 5 mg. Goal INR 2-3. Today's INR is 2.9. No missed doses. No changes in diet, medications, or alcohol. No recent illnesses. No signs or symptoms of bleeding or clotting.    States that she has had intermittent tingling of both hands and feet for the past week since her last appointment. No known triggers. Denies headaches, speech changes, extremity weakness.    Wondering if she needs to continue taking Diamox. She is having significant side effects of taste changes with this medication. Every beverage except water and strawberry/vanilla shakes tastes strongly like lemon.    Problem list, Medication list, Allergies, and Medical/Social/Surgical histories reviewed in EPIC and updated as appropriate.   Additional history: as documented    ROS:  Gen, heme negative except as listed per HPI    Histories:   Patient Active Problem List   Diagnosis     Atypical nevus     Acquired hypothyroidism     Dermatofibroma of face     Melanocytic nevus, unspecified location     Morbid obesity (H)     Cerebral venous thrombosis of cortical vein     Long term current use of anticoagulant therapy     Anticoagulation monitoring, INR range 2-3     Past Surgical History:   Procedure Laterality Date     DILATION AND CURETTAGE SUCTION N/A 5/27/2016    Procedure: DILATION AND CURETTAGE SUCTION;  Surgeon: Natacha Goyal MD;  Location: [facility]     ENT SURGERY      septal repair     LAPAROSCOPIC EVACUATION ECTOPIC PREGNANCY Left 1/28/2018    Procedure: LAPAROSCOPIC EVACUATION ECTOPIC PREGNANCY;  LAPAROSCOPIC SINGLE SITE EVACUATION OF ECTOPIC PREGNANCY, LEFT SALPINGECTOMY;  Surgeon: Erum Boles MD;   "Location: [facility]     LAPAROSCOPIC SALPINGECTOMY Left 1/28/2018    Procedure: LAPAROSCOPIC SALPINGECTOMY;;  Surgeon: Erum Boles MD;  Location: [facility] OR     LAPAROSCOPIC SALPINGO-OOPHORECTOMY  11/2/2013    Procedure: LAPAROSCOPIC SALPINGO-OOPHORECTOMY;  Single port laparoscopic, Evacuation of hemo-peritonium and Products of Conception;  Surgeon: Peggy Thornton MD;  Location: [facility] OR     NOSE SURGERY      age 12       Social History     Tobacco Use     Smoking status: Never Smoker     Smokeless tobacco: Never Used     Tobacco comment:  on occasion   Substance Use Topics     Alcohol use: No     Alcohol/week: 0.0 standard drinks     Comment: rare     History reviewed. No pertinent family history.      Current Outpatient Medications   Medication Sig Dispense Refill     acetaZOLAMIDE (DIAMOX) 250 MG tablet Take 2 tablets (500 mg) by mouth 2 times daily 120 tablet 3     levothyroxine (SYNTHROID/LEVOTHROID) 112 MCG tablet Take 2 tablets (224 mcg) by mouth daily 120 tablet 2     warfarin ANTICOAGULANT (COUMADIN) 5 MG tablet Take 1-1.5 tablets (5-7.5 mg) by mouth daily 7.5mg six days per week, 5mg one day per week 100 tablet 3       OBJECTIVE:                                                    Physical Exam:    /74   Pulse 78   Temp 98.7  F (37.1  C)   Resp 16   Ht 1.676 m (5' 6\")   Wt 111.6 kg (246 lb)   LMP 10/20/2020 (Exact Date)   SpO2 99%   BMI 39.71 kg/m      CONSTITUTIONAL: Alert non-toxic appearing female in no acute distress  RESPIRATORY: Respirations unlabored  NEUROLOGIC: No gross deficits, normal gait  SKIN: Appropriate color for race, warm and dry, no rashes or lesions to unclothed skin  PSYCHIATRIC: Pleasant and interactive, affect bright, makes appropriate eye contact, thought process logical       ASSESSMENT/PLAN:                                                        Paulina was seen today for inr followup.    Diagnoses and all orders for this visit:    Cerebral venous thrombosis " of cortical vein  -     Prothrombin - INR ([facility])  -     acetaZOLAMIDE (DIAMOX) 250 MG tablet; Take 2 tablets (500 mg) by mouth 2 times daily    Long term current use of anticoagulant therapy  -     Prothrombin - INR ([facility])    Anticoagulation monitoring, INR range 2-3  -     Prothrombin - INR ([facility])    Numbness and tingling in both hands    Numbness and tingling of both feet    Altered taste    Sending message to Neurology for guidance on Diamox and to see if repeat MRI is warranted with tingling of hands and feet.    The following health maintenance items are reviewed in Epic and correct as of today:  Health Maintenance   Topic Date Due     PREVENTIVE CARE VISIT  1982     EYE EXAM  1982     HEPATITIS C SCREENING  06/03/2000     PAP  04/01/2018     DTAP/TDAP/TD IMMUNIZATION (3 - Td) 09/04/2022     COLORECTAL CANCER SCREENING  10/20/2026     PHQ-2  Completed     INFLUENZA VACCINE  Addressed     Pneumococcal Vaccine: Pediatrics (0 to 5 Years) and At-Risk Patients (6 to 64 Years)  Aged Out     IPV IMMUNIZATION  Aged Out     MENINGITIS IMMUNIZATION  Aged Out     HEPATITIS B IMMUNIZATION  Aged Out     HIV SCREENING  Discontinued       Patient Instructions   Take 5 mg Monday, Thursday   Take 7.5 mg all other days  Next INR 1 week (already scheduled with Mikaela)    I will do some messaging/research into your vascular appointment and ask Dr. Earl about the Diamox      ABDON Ramirez CNP  [facility]  Family Practice  [facility]  [phone]    For any issues my office # is [phone]

## 2020-11-17 LAB
FUNGUS SPEC CULT: NORMAL
SPECIMEN SOURCE: NORMAL

## 2020-11-18 ENCOUNTER — TELEPHONE (OUTPATIENT)
Dept: FAMILY MEDICINE | Facility: CLINIC | Age: 40
End: 2020-11-18

## 2020-11-18 DIAGNOSIS — R20.2 PARESTHESIA: Primary | ICD-10-CM

## 2020-11-19 DIAGNOSIS — G08 CEREBRAL VENOUS THROMBOSIS OF CORTICAL VEIN: Primary | ICD-10-CM

## 2020-11-19 NOTE — TELEPHONE ENCOUNTER
Called Paulina regarding update from Dr. Earl. 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 If normal, can consider stopping Diamox. Continues with intermittent paresthesias but no weakness or other neurologic deficit- can possibly be side effect of Diamox. Will recheck electrolytes at her next INR check in five days.  Mikaela Jackman [facility]

## 2020-11-27 ENCOUNTER — OFFICE VISIT (OUTPATIENT)
Dept: OTHER | Facility: CLINIC | Age: 40
End: 2020-11-27
Attending: STUDENT IN AN ORGANIZED HEALTH CARE EDUCATION/TRAINING PROGRAM
Payer: COMMERCIAL

## 2020-11-27 ENCOUNTER — HOSPITAL ENCOUNTER (OUTPATIENT)
Dept: LAB | Facility: CLINIC | Age: 40
End: 2020-11-27
Attending: STUDENT IN AN ORGANIZED HEALTH CARE EDUCATION/TRAINING PROGRAM
Payer: COMMERCIAL

## 2020-11-27 VITALS
WEIGHT: 250 LBS | RESPIRATION RATE: 16 BRPM | HEART RATE: 76 BPM | SYSTOLIC BLOOD PRESSURE: 128 MMHG | DIASTOLIC BLOOD PRESSURE: 78 MMHG | HEIGHT: 64 IN | BODY MASS INDEX: 42.68 KG/M2 | OXYGEN SATURATION: 98 %

## 2020-11-27 DIAGNOSIS — G08 CEREBRAL VENOUS THROMBOSIS OF CORTICAL VEIN: ICD-10-CM

## 2020-11-27 LAB
C3 SERPL-MCNC: 127 MG/DL (ref 81–157)
C4 SERPL-MCNC: 25 MG/DL (ref 13–39)
D DIMER PPP FEU-MCNC: 0.3 UG/ML FEU (ref 0–0.5)

## 2020-11-27 PROCEDURE — 86160 COMPLEMENT ANTIGEN: CPT | Performed by: INTERNAL MEDICINE

## 2020-11-27 PROCEDURE — G0463 HOSPITAL OUTPT CLINIC VISIT: HCPCS

## 2020-11-27 PROCEDURE — 86162 COMPLEMENT TOTAL (CH50): CPT | Performed by: INTERNAL MEDICINE

## 2020-11-27 PROCEDURE — 85379 FIBRIN DEGRADATION QUANT: CPT | Performed by: INTERNAL MEDICINE

## 2020-11-27 PROCEDURE — 81291 MTHFR GENE: CPT | Performed by: INTERNAL MEDICINE

## 2020-11-27 PROCEDURE — 99244 OFF/OP CNSLTJ NEW/EST MOD 40: CPT | Performed by: INTERNAL MEDICINE

## 2020-11-27 PROCEDURE — G0452 MOLECULAR PATHOLOGY INTERPR: HCPCS | Mod: 26 | Performed by: PATHOLOGY

## 2020-11-27 PROCEDURE — 36415 COLL VENOUS BLD VENIPUNCTURE: CPT | Performed by: INTERNAL MEDICINE

## 2020-11-27 ASSESSMENT — MIFFLIN-ST. JEOR: SCORE: 1798.99

## 2020-11-27 NOTE — PROGRESS NOTES
Paulina Diana is a 40 year old female who is presenting at the current time to discuss her diagnosi(es) of     Cerebral venous thrombosis of cortical vein      HPI: Paulina Diana is a 40 year old [id number] female admitted 8/10/20 to [facility] for presumed ectopic cornu pregnancy S/P IM MTX w/ resolution of ectopic w/o prior h/o VTE who then presented to [facility] 10/17/2020 with persistent headache x 3 days that woke up her from sleep and was found to have CVST. Symptoms included photophobia, nausea and vomiting. In addition she had numbness and clumsiness of right hand. She was transferred to [facility] for further evaluation. CT head showed hyperdensities suggestive of sinus vein thrombosis, CTV revealed SSS, R transversae sinus, sigmoid sinus and upper jugular thrombosis and questionable right venous infarct.  CT perfusion abnormalities did not correspond with arterial territories. Found to have CVST 2/2 prothrombotic state due to recent ectopic pregnancy. She was treated with heparin gtt high intensity treatment, and eventually transitioned to warfarin with Lovenox bridge.  Also found incidentally during workup was leptomeningeal enhancement on MRI Brain, being worked up by neurology.       Review Of Systems  Skin: negative  Eyes: negative  Ears/Nose/Throat: negative  Respiratory: No shortness of breath, dyspnea on exertion, cough, or hemoptysis  Cardiovascular: negative  Gastrointestinal: negative  Genitourinary: negative  Musculoskeletal: negative  Neurologic: negative  Psychiatric: negative  Hematologic/Lymphatic/Immunologic: negative  Endocrine: negative    PAST MEDICAL HISTORY:                  Past Medical History:   Diagnosis Date     Ectopic pregnancy, tubal 2018     Thyroid disease     hypothyroid       PAST SURGICAL HISTORY:                  Past Surgical History:   Procedure Laterality Date     DILATION AND CURETTAGE SUCTION N/A 2016    Procedure: DILATION AND CURETTAGE SUCTION;  Surgeon: Natacha Goyal MD;   Location: [facility]     ENT SURGERY      septal repair     LAPAROSCOPIC EVACUATION ECTOPIC PREGNANCY Left 1/28/2018    Procedure: LAPAROSCOPIC EVACUATION ECTOPIC PREGNANCY;  LAPAROSCOPIC SINGLE SITE EVACUATION OF ECTOPIC PREGNANCY, LEFT SALPINGECTOMY;  Surgeon: Erum Boles MD;  Location:  OR     LAPAROSCOPIC SALPINGECTOMY Left 1/28/2018    Procedure: LAPAROSCOPIC SALPINGECTOMY;;  Surgeon: Erum Boles MD;  Location:  OR     LAPAROSCOPIC SALPINGO-OOPHORECTOMY  11/2/2013    Procedure: LAPAROSCOPIC SALPINGO-OOPHORECTOMY;  Single port laparoscopic, Evacuation of hemo-peritonium and Products of Conception;  Surgeon: Peggy Thornton MD;  Location:  OR     NOSE SURGERY      age 12       CURRENT MEDICATIONS:                  Current Outpatient Medications   Medication Sig Dispense Refill     acetaZOLAMIDE (DIAMOX) 250 MG tablet Take 2 tablets (500 mg) by mouth 2 times daily 120 tablet 3     levothyroxine (SYNTHROID/LEVOTHROID) 112 MCG tablet Take 2 tablets (224 mcg) by mouth daily 120 tablet 2     warfarin ANTICOAGULANT (COUMADIN) 5 MG tablet Take 1-1.5 tablets (5-7.5 mg) by mouth daily 7.5mg six days per week, 5mg one day per week 100 tablet 3       ALLERGIES:                  Allergies   Allergen Reactions     Ceclor Cd [Cefaclor Monohydrate] Hives       SOCIAL HISTORY:                  Social History     Socioeconomic History     Marital status:      Spouse name: Not on file     Number of children: Not on file     Years of education: Not on file     Highest education level: Not on file   Occupational History     Not on file   Social Needs     Financial resource strain: Not on file     Food insecurity     Worry: Not on file     Inability: Not on file     Transportation needs     Medical: Not on file     Non-medical: Not on file   Tobacco Use     Smoking status: Never Smoker     Smokeless tobacco: Never Used     Tobacco comment:  on occasion   Substance and Sexual Activity      Alcohol use: No     Alcohol/week: 0.0 standard drinks     Comment: rare     Drug use: No     Sexual activity: Yes     Partners: Male   Lifestyle     Physical activity     Days per week: Not on file     Minutes per session: Not on file     Stress: Not on file   Relationships     Social connections     Talks on phone: Not on file     Gets together: Not on file     Attends Judaism service: Not on file     Active member of club or organization: Not on file     Attends meetings of clubs or organizations: Not on file     Relationship status: Not on file     Intimate partner violence     Fear of current or ex partner: Not on file     Emotionally abused: Not on file     Physically abused: Not on file     Forced sexual activity: Not on file   Other Topics Concern     Not on file   Social History Narrative     Not on file       FAMILY HISTORY:                 No family history on file.          Physical exam Reveals:    O/P: WNL  HEENT: WNL  NECK: No JVD, thyromegaly, or lymphadenopathy  HEART: RRR, no murmurs, gallops, or rubs  LUNGS: CTA bilaterally without rales, wheezes, or rhonchi  GI: NABS, nondistended, nontender, soft  EXT:without cyanosis, clubbing, or edema  NEURO: nonfocal  : no flank tenderness    Head MRI without and with contrast  MRV of the head without contrast  MRV of the head with contrast     History:  Dural venous sinus thrombosis suspected.     Per EPIC: History of hypothyroidism, recent history of ectopic  pregnancy?presented to [facility] with 3 days of?progressive?headache,  found to have cerebral vein thrombosis, started on heparin gtt then  was transferred to [facility] for further evaluation.  ?  Comparison: Head CT and CTA from yesterday. R arm numbness and severe  headache persists. MRI/MRV?ordered?for further evaluation of  parenchymal injury?     Technique:   Head MRI: Noncontrast T1-weighted, T2-weighted, FLAIR,  diffusion-weighted, and susceptibility weighted images of the brain  obtained.  Postcontrast T1 weighted images were obtained after  gadolinium-based intravenous contrast administration.  Head MRV: 2D time-of-flight MR venogram (MRV) of the head was  performed without intravenous contrast. Following intravenous  gadolinium-based contrast administration, a contrast enhanced MRV of  the intracranial vessels was performed.     Contrast: 10CC GADAVIST      Findings:   Head MRI:   No diffusion restriction to suggest venous infarct. Scattered  leptomeningeal enhancement and associated diffusion restriction  throughout the bilateral cerebral hemispheres, likely represents  cortical venous congestion. Venous congestion is also visualized on  SWI.     None mass affect, midline shift, or evidence of intracranial  hemorrhage.  The ventricles are not enlarged out of proportion to the  cerebral sulci. The gray-white matter differentiation of the cerebral  hemispheres is preserved. The orbits and mastoid air cells are clear.  The mucosal thickening of the right maxillary sinus.     Head MRV demonstrates thrombosis within the superior sagittal sinus,  right transverse sinus, right sigmoid sinus, and proximal part of the  right internal jugular vein. Normal filling of inferior sagittal  sinus, vein of Gallen, straight sinus.                                                                      Impression:  1, No diffusion restriction to suggest venous infarct. Scattered  leptomeningeal enhancement and associated diffusion restriction  throughout the bilateral cerebral hemispheres, likely represents  cortical venous congestion.   2. Total occlusion of the superior sagittal sinus, right transverse  sinus, right sigmoid sinus, and proximal part of the right internal  jugular vein.      I have personally reviewed the examination and initial interpretation  and I agree with the findings.     DIONI SORENSON MD     Component      Latest Ref Rng & Units 10/18/2020             Alcohol Ethyl Urine       Negative    Amphetamine Urine       Negative   Barbiturates Urine       Negative   Benzodiazepines Urine       Negative   Cocaine Metabolite Urine       Negative   Opiates Urine       +++POSITIVE+++   Oxycodone Urine       +++POSITIVE+++   Methadone Urine       Negative   Phencyclidine Urine       Negative   THC Metabolite Urine       Negative   Creatinine Urine Drug Screen      >20 mg/dl 279   Immunoglobulin Serum IgG      768 - 1,632 mg/dL    Oligoclonal IgG CSF      0.0 - 6.0 mg/dL    Albumin by Nephelometry      3,500 - 5,200 mg/dL    Oligoclonal Albumin CSF      0 - 35 mg/dL    Oligoclonal Albumin Index      0.0 - 9.0 ratio    Oligoclonal IgG Index      0.28 - 0.66 ratio    CSF IgG/Albumin Ratio      0.09 - 0.25 ratio    Oligoclonal Banding      Negative    CSF Oligoclonal Bands Number      0 - 1 Bands    IgG Synthesis Rate      <=8.0 mg/d    Oligoclonal Interpretation          Albumin Fraction      3.7 - 5.1 g/dL    Alpha 1 Fraction      0.2 - 0.4 g/dL    Alpha 2 Fraction      0.5 - 0.9 g/dL    Beta Fraction      0.6 - 1.0 g/dL    Gamma Fraction      0.7 - 1.6 g/dL    Monoclonal Peak      0.0 g/dL    ELP Interpretation:          Specimen Description       Nares   Gram Stain Smear          EBV Qt Source          EB Virus DNA Quant Copy/mL      cpy/mL    EB Virus DNA Quant Log      log    EB Virus DNA Quant Interp      Not Detected    RNP Antibody IgG      0.0 - 0.9 AI    Lindquist JOSE Antibody IgG      0.0 - 0.9 AI    SSA (Ro) (JOSE) Antibody, IgG      0.0 - 0.9 AI    SSB (La) (JOSE) Antibody, IgG      0.0 - 0.9 AI    Immunofixation ELP          IGG      610 - 1,616 mg/dL    IGA      84 - 499 mg/dL    IGM      35 - 242 mg/dL    Herpes Simplex Type 1 CSF      NDET:Not Detected    Herpes Simplex Type 2 CSF      NDET:Not Detected    HSV CSF Comment          Methicillin Resist/Sens S. aureus PCR      NEG:Negative Negative   Beta 2 Glycoprotein 1 Antibody IgG      <7 U/mL Canceled, Test credited   Beta 2 Glycoprotein 1 Antibody  IgM      <7 U/mL Canceled, Test credited   Culture Micro          Cryptococcal Antigen Test          Lyme DNAPCR Specimen          Lyme DNAPCR          Neutrophil Cytoplasmic Antibody      <1:10 titer    Neutrophil Cytoplasmic Antibody Pattern          Oxycodone Confirm Ur      ng/ml 668   Oxymorphone Confirm Ur      ng/ml 797   Copath Report       Patient Name: WALTER HAYWARD .   Factor 2 Assay      60 - 140 % 155 (H)   Factor 5 Assay      60 - 140 % 109   Homocysteine umol/L      4.0 - 12.0 umol/L 13.2 (H)   Lupus Result      NEG:Negative Negative   Prot C Chromogenic      70 - 170 % 97   TSH      0.40 - 4.00 mU/L    Glucose CSF      40 - 70 mg/dL    ACE Angiotensin Conv Enz CSF      0.0 - 2.5 U/L    PIEDAD interpretation      NEG:Negative    Angiotensin Converting Enzyme      9 - 67 U/L    DNA-ds      <10 IU/mL    Liliam 1 Antibody IgG      0.0 - 0.9 AI    Hydromorphone Confirm Urine      ng/ml 1,728   ELP Interpretation Urine          Immunofix ELP Urine            Component      Latest Ref Rng & Units 10/19/2020             Alcohol Ethyl Urine          Amphetamine Urine          Barbiturates Urine          Benzodiazepines Urine          Cocaine Metabolite Urine          Opiates Urine          Oxycodone Urine          Methadone Urine          Phencyclidine Urine          THC Metabolite Urine          Creatinine Urine Drug Screen      >20 mg/dl    Immunoglobulin Serum IgG      768 - 1,632 mg/dL    Oligoclonal IgG CSF      0.0 - 6.0 mg/dL    Albumin by Nephelometry      3,500 - 5,200 mg/dL    Oligoclonal Albumin CSF      0 - 35 mg/dL    Oligoclonal Albumin Index      0.0 - 9.0 ratio    Oligoclonal IgG Index      0.28 - 0.66 ratio    CSF IgG/Albumin Ratio      0.09 - 0.25 ratio    Oligoclonal Banding      Negative    CSF Oligoclonal Bands Number      0 - 1 Bands    IgG Synthesis Rate      <=8.0 mg/d    Oligoclonal Interpretation          Albumin Fraction      3.7 - 5.1 g/dL    Alpha 1 Fraction      0.2 - 0.4 g/dL     Alpha 2 Fraction      0.5 - 0.9 g/dL    Beta Fraction      0.6 - 1.0 g/dL    Gamma Fraction      0.7 - 1.6 g/dL    Monoclonal Peak      0.0 g/dL    ELP Interpretation:          Specimen Description          Gram Stain Smear          EBV Qt Source          EB Virus DNA Quant Copy/mL      cpy/mL    EB Virus DNA Quant Log      log    EB Virus DNA Quant Interp      Not Detected    RNP Antibody IgG      0.0 - 0.9 AI    Lindquist JOSE Antibody IgG      0.0 - 0.9 AI    SSA (Ro) (JOSE) Antibody, IgG      0.0 - 0.9 AI    SSB (La) (JOSE) Antibody, IgG      0.0 - 0.9 AI    Immunofixation ELP          IGG      610 - 1,616 mg/dL    IGA      84 - 499 mg/dL    IGM      35 - 242 mg/dL    Herpes Simplex Type 1 CSF      NDET:Not Detected    Herpes Simplex Type 2 CSF      NDET:Not Detected    HSV CSF Comment          Methicillin Resist/Sens S. aureus PCR      NEG:Negative    Beta 2 Glycoprotein 1 Antibody IgG      <7 U/mL 0.8   Beta 2 Glycoprotein 1 Antibody IgM      <7 U/mL <2.9   Culture Micro          Cryptococcal Antigen Test          Lyme DNAPCR Specimen          Lyme DNAPCR          Neutrophil Cytoplasmic Antibody      <1:10 titer    Neutrophil Cytoplasmic Antibody Pattern          Oxycodone Confirm Ur      ng/ml    Oxymorphone Confirm Ur      ng/ml    Copath Report          Factor 2 Assay      60 - 140 %    Factor 5 Assay      60 - 140 %    Homocysteine umol/L      4.0 - 12.0 umol/L    Lupus Result      NEG:Negative    Prot C Chromogenic      70 - 170 %    TSH      0.40 - 4.00 mU/L 0.49   Glucose CSF      40 - 70 mg/dL    ACE Angiotensin Conv Enz CSF      0.0 - 2.5 U/L    PIEDAD interpretation      NEG:Negative    Angiotensin Converting Enzyme      9 - 67 U/L    DNA-ds      <10 IU/mL    Liliam 1 Antibody IgG      0.0 - 0.9 AI    Hydromorphone Confirm Urine      ng/ml    ELP Interpretation Urine          Immunofix ELP Urine            Component      Latest Ref Rng & Units 10/20/2020          11:30 AM   Alcohol Ethyl Urine           Amphetamine Urine          Barbiturates Urine          Benzodiazepines Urine          Cocaine Metabolite Urine          Opiates Urine          Oxycodone Urine          Methadone Urine          Phencyclidine Urine          THC Metabolite Urine          Creatinine Urine Drug Screen      >20 mg/dl    Immunoglobulin Serum IgG      768 - 1,632 mg/dL 1,120   Oligoclonal IgG CSF      0.0 - 6.0 mg/dL 10.8 (H)   Albumin by Nephelometry      3,500 - 5,200 mg/dL 3,120 (L)   Oligoclonal Albumin CSF      0 - 35 mg/dL 50 (H)   Oligoclonal Albumin Index      0.0 - 9.0 ratio 16.0 (H)   Oligoclonal IgG Index      0.28 - 0.66 ratio 0.60   CSF IgG/Albumin Ratio      0.09 - 0.25 ratio 0.22   Oligoclonal Banding      Negative Negative   CSF Oligoclonal Bands Number      0 - 1 Bands 0   IgG Synthesis Rate      <=8.0 mg/d 10.7 (H)   Oligoclonal Interpretation       SEE NOTE   Albumin Fraction      3.7 - 5.1 g/dL    Alpha 1 Fraction      0.2 - 0.4 g/dL    Alpha 2 Fraction      0.5 - 0.9 g/dL    Beta Fraction      0.6 - 1.0 g/dL    Gamma Fraction      0.7 - 1.6 g/dL    Monoclonal Peak      0.0 g/dL    ELP Interpretation:          Specimen Description       Cerebrospinal fluid   Gram Stain Smear          EBV Qt Source       Cerebrospinal fluid   EB Virus DNA Quant Copy/mL      cpy/mL <390   EB Virus DNA Quant Log      log <2.6   EB Virus DNA Quant Interp      Not Detected Not Detected   RNP Antibody IgG      0.0 - 0.9 AI    Lindquist JOSE Antibody IgG      0.0 - 0.9 AI    SSA (Ro) (JOSE) Antibody, IgG      0.0 - 0.9 AI    SSB (La) (JOSE) Antibody, IgG      0.0 - 0.9 AI    Immunofixation ELP          IGG      610 - 1,616 mg/dL    IGA      84 - 499 mg/dL    IGM      35 - 242 mg/dL    Herpes Simplex Type 1 CSF      NDET:Not Detected Not Detected   Herpes Simplex Type 2 CSF      NDET:Not Detected Not Detected   HSV CSF Comment       (Note)   Methicillin Resist/Sens S. aureus PCR      NEG:Negative    Beta 2 Glycoprotein 1 Antibody IgG      <7 U/mL     Beta 2 Glycoprotein 1 Antibody IgM      <7 U/mL    Culture Micro          Cryptococcal Antigen Test       Negative for cryptococcal antigen   Lyme DNAPCR Specimen       Cerebrospinal fluid   Lyme DNAPCR       Not Detected   Neutrophil Cytoplasmic Antibody      <1:10 titer    Neutrophil Cytoplasmic Antibody Pattern          Oxycodone Confirm Ur      ng/ml    Oxymorphone Confirm Ur      ng/ml    Copath Report       Patient Name: WALTER HAYWARD   Factor 2 Assay      60 - 140 %    Factor 5 Assay      60 - 140 %    Homocysteine umol/L      4.0 - 12.0 umol/L    Lupus Result      NEG:Negative    Prot C Chromogenic      70 - 170 %    TSH      0.40 - 4.00 mU/L    Glucose CSF      40 - 70 mg/dL 50   ACE Angiotensin Conv Enz CSF      0.0 - 2.5 U/L 2.7 (H)   PIEDAD interpretation      NEG:Negative    Angiotensin Converting Enzyme      9 - 67 U/L    DNA-ds      <10 IU/mL    Liliam 1 Antibody IgG      0.0 - 0.9 AI    Hydromorphone Confirm Urine      ng/ml    ELP Interpretation Urine          Immunofix ELP Urine            Component      Latest Ref Rng & Units 10/20/2020          11:30 AM   Alcohol Ethyl Urine          Amphetamine Urine          Barbiturates Urine          Benzodiazepines Urine          Cocaine Metabolite Urine          Opiates Urine          Oxycodone Urine          Methadone Urine          Phencyclidine Urine          THC Metabolite Urine          Creatinine Urine Drug Screen      >20 mg/dl    Immunoglobulin Serum IgG      768 - 1,632 mg/dL    Oligoclonal IgG CSF      0.0 - 6.0 mg/dL    Albumin by Nephelometry      3,500 - 5,200 mg/dL    Oligoclonal Albumin CSF      0 - 35 mg/dL    Oligoclonal Albumin Index      0.0 - 9.0 ratio    Oligoclonal IgG Index      0.28 - 0.66 ratio    CSF IgG/Albumin Ratio      0.09 - 0.25 ratio    Oligoclonal Banding      Negative    CSF Oligoclonal Bands Number      0 - 1 Bands    IgG Synthesis Rate      <=8.0 mg/d    Oligoclonal Interpretation          Albumin Fraction      3.7 - 5.1  g/dL    Alpha 1 Fraction      0.2 - 0.4 g/dL    Alpha 2 Fraction      0.5 - 0.9 g/dL    Beta Fraction      0.6 - 1.0 g/dL    Gamma Fraction      0.7 - 1.6 g/dL    Monoclonal Peak      0.0 g/dL    ELP Interpretation:          Specimen Description       Cerebrospinal fluid   Gram Stain Smear          EBV Qt Source          EB Virus DNA Quant Copy/mL      cpy/mL    EB Virus DNA Quant Log      log    EB Virus DNA Quant Interp      Not Detected    RNP Antibody IgG      0.0 - 0.9 AI    Lindquist JOSE Antibody IgG      0.0 - 0.9 AI    SSA (Ro) (JOSE) Antibody, IgG      0.0 - 0.9 AI    SSB (La) (JOSE) Antibody, IgG      0.0 - 0.9 AI    Immunofixation ELP          IGG      610 - 1,616 mg/dL    IGA      84 - 499 mg/dL    IGM      35 - 242 mg/dL    Herpes Simplex Type 1 CSF      NDET:Not Detected    Herpes Simplex Type 2 CSF      NDET:Not Detected    HSV CSF Comment          Methicillin Resist/Sens S. aureus PCR      NEG:Negative    Beta 2 Glycoprotein 1 Antibody IgG      <7 U/mL    Beta 2 Glycoprotein 1 Antibody IgM      <7 U/mL    Culture Micro       No growth   Cryptococcal Antigen Test          Lyme DNAPCR Specimen          Lyme DNAPCR          Neutrophil Cytoplasmic Antibody      <1:10 titer    Neutrophil Cytoplasmic Antibody Pattern          Oxycodone Confirm Ur      ng/ml    Oxymorphone Confirm Ur      ng/ml    Copath Report       Patient Name: WALTER HAYWARD Alen Lowry .   Factor 2 Assay      60 - 140 %    Factor 5 Assay      60 - 140 %    Homocysteine umol/L      4.0 - 12.0 umol/L    Lupus Result      NEG:Negative    Prot C Chromogenic      70 - 170 %    TSH      0.40 - 4.00 mU/L    Glucose CSF      40 - 70 mg/dL    ACE Angiotensin Conv Enz CSF      0.0 - 2.5 U/L    PIEDAD interpretation      NEG:Negative    Angiotensin Converting Enzyme      9 - 67 U/L    DNA-ds      <10 IU/mL    Liliam 1 Antibody IgG      0.0 - 0.9 AI    Hydromorphone Confirm Urine      ng/ml    ELP Interpretation Urine          Immunofix ELP Urine            Component       Latest Ref Rng & Units 10/20/2020          11:30 AM   Alcohol Ethyl Urine          Amphetamine Urine          Barbiturates Urine          Benzodiazepines Urine          Cocaine Metabolite Urine          Opiates Urine          Oxycodone Urine          Methadone Urine          Phencyclidine Urine          THC Metabolite Urine          Creatinine Urine Drug Screen      >20 mg/dl    Immunoglobulin Serum IgG      768 - 1,632 mg/dL    Oligoclonal IgG CSF      0.0 - 6.0 mg/dL    Albumin by Nephelometry      3,500 - 5,200 mg/dL    Oligoclonal Albumin CSF      0 - 35 mg/dL    Oligoclonal Albumin Index      0.0 - 9.0 ratio    Oligoclonal IgG Index      0.28 - 0.66 ratio    CSF IgG/Albumin Ratio      0.09 - 0.25 ratio    Oligoclonal Banding      Negative    CSF Oligoclonal Bands Number      0 - 1 Bands    IgG Synthesis Rate      <=8.0 mg/d    Oligoclonal Interpretation          Albumin Fraction      3.7 - 5.1 g/dL    Alpha 1 Fraction      0.2 - 0.4 g/dL    Alpha 2 Fraction      0.5 - 0.9 g/dL    Beta Fraction      0.6 - 1.0 g/dL    Gamma Fraction      0.7 - 1.6 g/dL    Monoclonal Peak      0.0 g/dL    ELP Interpretation:          Specimen Description       Cerebrospinal fluid   Gram Stain Smear          EBV Qt Source          EB Virus DNA Quant Copy/mL      cpy/mL    EB Virus DNA Quant Log      log    EB Virus DNA Quant Interp      Not Detected    RNP Antibody IgG      0.0 - 0.9 AI    Lindquist JOSE Antibody IgG      0.0 - 0.9 AI    SSA (Ro) (JOSE) Antibody, IgG      0.0 - 0.9 AI    SSB (La) (JOSE) Antibody, IgG      0.0 - 0.9 AI    Immunofixation ELP          IGG      610 - 1,616 mg/dL    IGA      84 - 499 mg/dL    IGM      35 - 242 mg/dL    Herpes Simplex Type 1 CSF      NDET:Not Detected    Herpes Simplex Type 2 CSF      NDET:Not Detected    HSV CSF Comment          Methicillin Resist/Sens S. aureus PCR      NEG:Negative    Beta 2 Glycoprotein 1 Antibody IgG      <7 U/mL    Beta 2 Glycoprotein 1 Antibody IgM      <7 U/mL     Culture Micro       Culture negative after 4 weeks   Cryptococcal Antigen Test          Lyme DNAPCR Specimen          Lyme DNAPCR          Neutrophil Cytoplasmic Antibody      <1:10 titer    Neutrophil Cytoplasmic Antibody Pattern          Oxycodone Confirm Ur      ng/ml    Oxymorphone Confirm Ur      ng/ml    Copath Report          Factor 2 Assay      60 - 140 %    Factor 5 Assay      60 - 140 %    Homocysteine umol/L      4.0 - 12.0 umol/L    Lupus Result      NEG:Negative    Prot C Chromogenic      70 - 170 %    TSH      0.40 - 4.00 mU/L    Glucose CSF      40 - 70 mg/dL    ACE Angiotensin Conv Enz CSF      0.0 - 2.5 U/L    PIEDAD interpretation      NEG:Negative    Angiotensin Converting Enzyme      9 - 67 U/L    DNA-ds      <10 IU/mL    Liliam 1 Antibody IgG      0.0 - 0.9 AI    Hydromorphone Confirm Urine      ng/ml    ELP Interpretation Urine          Immunofix ELP Urine            Component      Latest Ref Rng & Units 10/20/2020 10/20/2020          11:30 AM 11:30 AM   Alcohol Ethyl Urine           Amphetamine Urine           Barbiturates Urine           Benzodiazepines Urine           Cocaine Metabolite Urine           Opiates Urine           Oxycodone Urine           Methadone Urine           Phencyclidine Urine           THC Metabolite Urine           Creatinine Urine Drug Screen      >20 mg/dl     Immunoglobulin Serum IgG      768 - 1,632 mg/dL     Oligoclonal IgG CSF      0.0 - 6.0 mg/dL     Albumin by Nephelometry      3,500 - 5,200 mg/dL     Oligoclonal Albumin CSF      0 - 35 mg/dL     Oligoclonal Albumin Index      0.0 - 9.0 ratio     Oligoclonal IgG Index      0.28 - 0.66 ratio     CSF IgG/Albumin Ratio      0.09 - 0.25 ratio     Oligoclonal Banding      Negative     CSF Oligoclonal Bands Number      0 - 1 Bands     IgG Synthesis Rate      <=8.0 mg/d     Oligoclonal Interpretation           Albumin Fraction      3.7 - 5.1 g/dL     Alpha 1 Fraction      0.2 - 0.4 g/dL     Alpha 2 Fraction      0.5 - 0.9  g/dL     Beta Fraction      0.6 - 1.0 g/dL     Gamma Fraction      0.7 - 1.6 g/dL     Monoclonal Peak      0.0 g/dL     ELP Interpretation:           Specimen Description       Cerebrospinal fluid    Gram Stain Smear       No organisms seen Moderate . . .   EBV Qt Source           EB Virus DNA Quant Copy/mL      cpy/mL     EB Virus DNA Quant Log      log     EB Virus DNA Quant Interp      Not Detected     RNP Antibody IgG      0.0 - 0.9 AI     [place]quist JOSE Antibody IgG      0.0 - 0.9 AI     SSA (Ro) (JOSE) Antibody, IgG      0.0 - 0.9 AI     SSB (La) (JOSE) Antibody, IgG      0.0 - 0.9 AI     Immunofixation ELP           IGG      610 - 1,616 mg/dL     IGA      84 - 499 mg/dL     IGM      35 - 242 mg/dL     Herpes Simplex Type 1 CSF      NDET:Not Detected     Herpes Simplex Type 2 CSF      NDET:Not Detected     HSV CSF Comment           Methicillin Resist/Sens S. aureus PCR      NEG:Negative     Beta 2 Glycoprotein 1 Antibody IgG      <7 U/mL     Beta 2 Glycoprotein 1 Antibody IgM      <7 U/mL     Culture Micro           Cryptococcal Antigen Test           Lyme DNAPCR Specimen           Lyme DNAPCR           Neutrophil Cytoplasmic Antibody      <1:10 titer     Neutrophil Cytoplasmic Antibody Pattern           Oxycodone Confirm Ur      ng/ml     Oxymorphone Confirm Ur      ng/ml     Copath Report           Factor 2 Assay      60 - 140 %     Factor 5 Assay      60 - 140 %     Homocysteine umol/L      4.0 - 12.0 umol/L     Lupus Result      NEG:Negative     Prot C Chromogenic      70 - 170 %     TSH      0.40 - 4.00 mU/L     Glucose CSF      40 - 70 mg/dL     ACE Angiotensin Conv Enz CSF      0.0 - 2.5 U/L     PIEDAD interpretation      NEG:Negative     Angiotensin Converting Enzyme      9 - 67 U/L     DNA-ds      <10 IU/mL     Liliam 1 Antibody IgG      0.0 - 0.9 AI     Hydromorphone Confirm Urine      ng/ml     ELP Interpretation Urine           Immunofix ELP Urine             Component      Latest Ref Rng & Units 10/20/2020           11:30 AM   Alcohol Ethyl Urine          Amphetamine Urine          Barbiturates Urine          Benzodiazepines Urine          Cocaine Metabolite Urine          Opiates Urine          Oxycodone Urine          Methadone Urine          Phencyclidine Urine          THC Metabolite Urine          Creatinine Urine Drug Screen      >20 mg/dl    Immunoglobulin Serum IgG      768 - 1,632 mg/dL    Oligoclonal IgG CSF      0.0 - 6.0 mg/dL    Albumin by Nephelometry      3,500 - 5,200 mg/dL    Oligoclonal Albumin CSF      0 - 35 mg/dL    Oligoclonal Albumin Index      0.0 - 9.0 ratio    Oligoclonal IgG Index      0.28 - 0.66 ratio    CSF IgG/Albumin Ratio      0.09 - 0.25 ratio    Oligoclonal Banding      Negative    CSF Oligoclonal Bands Number      0 - 1 Bands    IgG Synthesis Rate      <=8.0 mg/d    Oligoclonal Interpretation          Albumin Fraction      3.7 - 5.1 g/dL    Alpha 1 Fraction      0.2 - 0.4 g/dL    Alpha 2 Fraction      0.5 - 0.9 g/dL    Beta Fraction      0.6 - 1.0 g/dL    Gamma Fraction      0.7 - 1.6 g/dL    Monoclonal Peak      0.0 g/dL    ELP Interpretation:          Specimen Description          Gram Stain Smear       Quantification of host cells and microbiological organisms was done on a cytocentrifuged . . .   EBV Qt Source          EB Virus DNA Quant Copy/mL      cpy/mL    EB Virus DNA Quant Log      log    EB Virus DNA Quant Interp      Not Detected    RNP Antibody IgG      0.0 - 0.9 AI    Lindquist JOSE Antibody IgG      0.0 - 0.9 AI    SSA (Ro) (JOSE) Antibody, IgG      0.0 - 0.9 AI    SSB (La) (JOSE) Antibody, IgG      0.0 - 0.9 AI    Immunofixation ELP          IGG      610 - 1,616 mg/dL    IGA      84 - 499 mg/dL    IGM      35 - 242 mg/dL    Herpes Simplex Type 1 CSF      NDET:Not Detected    Herpes Simplex Type 2 CSF      NDET:Not Detected    HSV CSF Comment          Methicillin Resist/Sens S. aureus PCR      NEG:Negative    Beta 2 Glycoprotein 1 Antibody IgG      <7 U/mL    Beta 2 Glycoprotein 1  Antibody IgM      <7 U/mL    Culture Micro          Cryptococcal Antigen Test          Lyme DNAPCR Specimen          Lyme DNAPCR          Neutrophil Cytoplasmic Antibody      <1:10 titer    Neutrophil Cytoplasmic Antibody Pattern          Oxycodone Confirm Ur      ng/ml    Oxymorphone Confirm Ur      ng/ml    Copath Report          Factor 2 Assay      60 - 140 %    Factor 5 Assay      60 - 140 %    Homocysteine umol/L      4.0 - 12.0 umol/L    Lupus Result      NEG:Negative    Prot C Chromogenic      70 - 170 %    TSH      0.40 - 4.00 mU/L    Glucose CSF      40 - 70 mg/dL    ACE Angiotensin Conv Enz CSF      0.0 - 2.5 U/L    PIEDAD interpretation      NEG:Negative    Angiotensin Converting Enzyme      9 - 67 U/L    DNA-ds      <10 IU/mL    Liliam 1 Antibody IgG      0.0 - 0.9 AI    Hydromorphone Confirm Urine      ng/ml    ELP Interpretation Urine          Immunofix ELP Urine            Component      Latest Ref Rng & Units 10/20/2020           1:46 PM   Alcohol Ethyl Urine          Amphetamine Urine          Barbiturates Urine          Benzodiazepines Urine          Cocaine Metabolite Urine          Opiates Urine          Oxycodone Urine          Methadone Urine          Phencyclidine Urine          THC Metabolite Urine          Creatinine Urine Drug Screen      >20 mg/dl    Immunoglobulin Serum IgG      768 - 1,632 mg/dL    Oligoclonal IgG CSF      0.0 - 6.0 mg/dL    Albumin by Nephelometry      3,500 - 5,200 mg/dL    Oligoclonal Albumin CSF      0 - 35 mg/dL    Oligoclonal Albumin Index      0.0 - 9.0 ratio    Oligoclonal IgG Index      0.28 - 0.66 ratio    CSF IgG/Albumin Ratio      0.09 - 0.25 ratio    Oligoclonal Banding      Negative    CSF Oligoclonal Bands Number      0 - 1 Bands    IgG Synthesis Rate      <=8.0 mg/d    Oligoclonal Interpretation          Albumin Fraction      3.7 - 5.1 g/dL    Alpha 1 Fraction      0.2 - 0.4 g/dL    Alpha 2 Fraction      0.5 - 0.9 g/dL    Beta Fraction      0.6 - 1.0 g/dL     Gamma Fraction      0.7 - 1.6 g/dL    Monoclonal Peak      0.0 g/dL    ELP Interpretation:          Specimen Description          Gram Stain Smear          EBV Qt Source          EB Virus DNA Quant Copy/mL      cpy/mL    EB Virus DNA Quant Log      log    EB Virus DNA Quant Interp      Not Detected    RNP Antibody IgG      0.0 - 0.9 AI    Lindquist JOSE Antibody IgG      0.0 - 0.9 AI    SSA (Ro) (JOSE) Antibody, IgG      0.0 - 0.9 AI <0.2   SSB (La) (JOSE) Antibody, IgG      0.0 - 0.9 AI <0.2   Immunofixation ELP          IGG      610 - 1,616 mg/dL    IGA      84 - 499 mg/dL    IGM      35 - 242 mg/dL    Herpes Simplex Type 1 CSF      NDET:Not Detected    Herpes Simplex Type 2 CSF      NDET:Not Detected    HSV CSF Comment          Methicillin Resist/Sens S. aureus PCR      NEG:Negative    Beta 2 Glycoprotein 1 Antibody IgG      <7 U/mL    Beta 2 Glycoprotein 1 Antibody IgM      <7 U/mL    Culture Micro          Cryptococcal Antigen Test          Lyme DNAPCR Specimen          Lyme DNAPCR          Neutrophil Cytoplasmic Antibody      <1:10 titer 1:20 (A)   Neutrophil Cytoplasmic Antibody Pattern       Atypical perinuclear ANCA (atypical p-ANCA) staining pattern observed. Presence of p-ANCA . . .   Oxycodone Confirm Ur      ng/ml    Oxymorphone Confirm Ur      ng/ml    Copath Report          Factor 2 Assay      60 - 140 %    Factor 5 Assay      60 - 140 %    Homocysteine umol/L      4.0 - 12.0 umol/L    Lupus Result      NEG:Negative    Prot C Chromogenic      70 - 170 %    TSH      0.40 - 4.00 mU/L    Glucose CSF      40 - 70 mg/dL    ACE Angiotensin Conv Enz CSF      0.0 - 2.5 U/L    PIEDAD interpretation      NEG:Negative Negative   Angiotensin Converting Enzyme      9 - 67 U/L 22   DNA-ds      <10 IU/mL 1   Liliam 1 Antibody IgG      0.0 - 0.9 AI <0.2   Hydromorphone Confirm Urine      ng/ml    ELP Interpretation Urine          Immunofix ELP Urine            Component      Latest Ref Rng & Units 10/20/2020           1:46 PM    Alcohol Ethyl Urine          Amphetamine Urine          Barbiturates Urine          Benzodiazepines Urine          Cocaine Metabolite Urine          Opiates Urine          Oxycodone Urine          Methadone Urine          Phencyclidine Urine          THC Metabolite Urine          Creatinine Urine Drug Screen      >20 mg/dl    Immunoglobulin Serum IgG      768 - 1,632 mg/dL    Oligoclonal IgG CSF      0.0 - 6.0 mg/dL    Albumin by Nephelometry      3,500 - 5,200 mg/dL    Oligoclonal Albumin CSF      0 - 35 mg/dL    Oligoclonal Albumin Index      0.0 - 9.0 ratio    Oligoclonal IgG Index      0.28 - 0.66 ratio    CSF IgG/Albumin Ratio      0.09 - 0.25 ratio    Oligoclonal Banding      Negative    CSF Oligoclonal Bands Number      0 - 1 Bands    IgG Synthesis Rate      <=8.0 mg/d    Oligoclonal Interpretation          Albumin Fraction      3.7 - 5.1 g/dL    Alpha 1 Fraction      0.2 - 0.4 g/dL    Alpha 2 Fraction      0.5 - 0.9 g/dL    Beta Fraction      0.6 - 1.0 g/dL    Gamma Fraction      0.7 - 1.6 g/dL    Monoclonal Peak      0.0 g/dL    ELP Interpretation:          Specimen Description          Gram Stain Smear          EBV Qt Source          EB Virus DNA Quant Copy/mL      cpy/mL    EB Virus DNA Quant Log      log    EB Virus DNA Quant Interp      Not Detected    RNP Antibody IgG      0.0 - 0.9 AI <0.2   Lindquist JOSE Antibody IgG      0.0 - 0.9 AI <0.2   SSA (Ro) (JOSE) Antibody, IgG      0.0 - 0.9 AI <0.2   SSB (La) (JOSE) Antibody, IgG      0.0 - 0.9 AI <0.2   Immunofixation ELP          IGG      610 - 1,616 mg/dL    IGA      84 - 499 mg/dL    IGM      35 - 242 mg/dL    Herpes Simplex Type 1 CSF      NDET:Not Detected    Herpes Simplex Type 2 CSF      NDET:Not Detected    HSV CSF Comment          Methicillin Resist/Sens S. aureus PCR      NEG:Negative    Beta 2 Glycoprotein 1 Antibody IgG      <7 U/mL    Beta 2 Glycoprotein 1 Antibody IgM      <7 U/mL    Culture Micro          Cryptococcal Antigen Test           Lyme DNAPCR Specimen          Lyme DNAPCR          Neutrophil Cytoplasmic Antibody      <1:10 titer    Neutrophil Cytoplasmic Antibody Pattern          Oxycodone Confirm Ur      ng/ml    Oxymorphone Confirm Ur      ng/ml    Copath Report          Factor 2 Assay      60 - 140 %    Factor 5 Assay      60 - 140 %    Homocysteine umol/L      4.0 - 12.0 umol/L    Lupus Result      NEG:Negative    Prot C Chromogenic      70 - 170 %    TSH      0.40 - 4.00 mU/L    Glucose CSF      40 - 70 mg/dL    ACE Angiotensin Conv Enz CSF      0.0 - 2.5 U/L    PIEDAD interpretation      NEG:Negative    Angiotensin Converting Enzyme      9 - 67 U/L    DNA-ds      <10 IU/mL    Liliam 1 Antibody IgG      0.0 - 0.9 AI    Hydromorphone Confirm Urine      ng/ml    ELP Interpretation Urine          Immunofix ELP Urine            Component      Latest Ref Rng & Units 10/21/2020             Alcohol Ethyl Urine          Amphetamine Urine          Barbiturates Urine          Benzodiazepines Urine          Cocaine Metabolite Urine          Opiates Urine          Oxycodone Urine          Methadone Urine          Phencyclidine Urine          THC Metabolite Urine          Creatinine Urine Drug Screen      >20 mg/dl    Immunoglobulin Serum IgG      768 - 1,632 mg/dL    Oligoclonal IgG CSF      0.0 - 6.0 mg/dL    Albumin by Nephelometry      3,500 - 5,200 mg/dL    Oligoclonal Albumin CSF      0 - 35 mg/dL    Oligoclonal Albumin Index      0.0 - 9.0 ratio    Oligoclonal IgG Index      0.28 - 0.66 ratio    CSF IgG/Albumin Ratio      0.09 - 0.25 ratio    Oligoclonal Banding      Negative    CSF Oligoclonal Bands Number      0 - 1 Bands    IgG Synthesis Rate      <=8.0 mg/d    Oligoclonal Interpretation          Albumin Fraction      3.7 - 5.1 g/dL 3.5 (L)   Alpha 1 Fraction      0.2 - 0.4 g/dL 0.5 (H)   Alpha 2 Fraction      0.5 - 0.9 g/dL 0.9   Beta Fraction      0.6 - 1.0 g/dL 1.1 (H)   Gamma Fraction      0.7 - 1.6 g/dL 1.3   Monoclonal Peak      0.0 g/dL  0.0   ELP Interpretation:       Hypoalbuminemia with increased alpha 1 and high normal alpha 2 globulins, and slightly . . .   Specimen Description          Gram Stain Smear          EBV Qt Source          EB Virus DNA Quant Copy/mL      cpy/mL    EB Virus DNA Quant Log      log    EB Virus DNA Quant Interp      Not Detected    RNP Antibody IgG      0.0 - 0.9 AI    Lindquist JOSE Antibody IgG      0.0 - 0.9 AI    SSA (Ro) (JOSE) Antibody, IgG      0.0 - 0.9 AI    SSB (La) (JOSE) Antibody, IgG      0.0 - 0.9 AI    Immunofixation ELP       No monoclonal protein seen on immunofixation.  Pathological significance requires clinical . . .   IGG      610 - 1,616 mg/dL 1,270   IGA      84 - 499 mg/dL 331   IGM      35 - 242 mg/dL 175   Herpes Simplex Type 1 CSF      NDET:Not Detected    Herpes Simplex Type 2 CSF      NDET:Not Detected    HSV CSF Comment          Methicillin Resist/Sens S. aureus PCR      NEG:Negative    Beta 2 Glycoprotein 1 Antibody IgG      <7 U/mL    Beta 2 Glycoprotein 1 Antibody IgM      <7 U/mL    Culture Micro          Cryptococcal Antigen Test          Lyme DNAPCR Specimen          Lyme DNAPCR          Neutrophil Cytoplasmic Antibody      <1:10 titer    Neutrophil Cytoplasmic Antibody Pattern          Oxycodone Confirm Ur      ng/ml    Oxymorphone Confirm Ur      ng/ml    Copath Report          Factor 2 Assay      60 - 140 %    Factor 5 Assay      60 - 140 %    Homocysteine umol/L      4.0 - 12.0 umol/L    Lupus Result      NEG:Negative    Prot C Chromogenic      70 - 170 %    TSH      0.40 - 4.00 mU/L    Glucose CSF      40 - 70 mg/dL    ACE Angiotensin Conv Enz CSF      0.0 - 2.5 U/L    PIEDAD interpretation      NEG:Negative    Angiotensin Converting Enzyme      9 - 67 U/L    DNA-ds      <10 IU/mL    Liliam 1 Antibody IgG      0.0 - 0.9 AI    Hydromorphone Confirm Urine      ng/ml    ELP Interpretation Urine       Only trace albumin and trace globulins. No obvious monoclonal protein seen. Pathological . . .    Immunofix ELP Urine       No monoclonal protein seen on immunofixation.  Pathological significance requires clinical . . .     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 A sshe also had an elevated homocysteine and P-ANCA levels, I will  check the below, RTC in two weeks to go over all results, and determine final duration of OAC. Likely six months. Will discuss transitioning form warfarin to Xarelto for convenience sake when seen in two weeks and all results are known  Plan: D dimer quantitative, MTHFR genotype,         Complement C3, Complement C4, Complement         Activity Total (CH50), Follow-Up with Vascular         Medicine           Greater than one half the 60 minutes total spent on the pt's visit were spent providing education and counselling to the patient regarding the above matters.

## 2020-11-27 NOTE — NURSING NOTE
"Paulina Diana is a 40 year old female who presents for:  Chief Complaint   Patient presents with     Consult     cerebral venous thrombosis of cortical vein.         Vitals:    Vitals:    11/27/20 0850 11/27/20 0852   BP: 121/77 128/78   BP Location: Right arm Left arm   Patient Position: Sitting    Pulse: 76    Resp: 16    SpO2: 98%    Weight: 250 lb (113.4 kg)    Height: 5' 4\" (1.626 m)        BMI:  Estimated body mass index is 42.91 kg/m  as calculated from the following:    Height as of this encounter: 5' 4\" (1.626 m).    Weight as of this encounter: 250 lb (113.4 kg).    Pain Score:  Data Unavailable        Marcia Morales RN      "

## 2020-11-28 LAB — CH50 SERPL-ACNC: 75.3 U/ML (ref 38.7–89.9)

## 2020-11-30 VITALS
HEIGHT: 65 IN | WEIGHT: 249.8 LBS | TEMPERATURE: 98.3 F | SYSTOLIC BLOOD PRESSURE: 122 MMHG | BODY MASS INDEX: 41.62 KG/M2 | DIASTOLIC BLOOD PRESSURE: 80 MMHG

## 2020-11-30 DIAGNOSIS — Z79.01 LONG TERM CURRENT USE OF ANTICOAGULANT THERAPY: ICD-10-CM

## 2020-11-30 DIAGNOSIS — Z79.01 ANTICOAGULATION MONITORING, INR RANGE 2-3: ICD-10-CM

## 2020-11-30 DIAGNOSIS — G08 CEREBRAL VENOUS THROMBOSIS OF CORTICAL VEIN: ICD-10-CM

## 2020-11-30 LAB
COPATH REPORT: NORMAL
INR PPP: 2.7 (ref 2–3)

## 2020-11-30 PROCEDURE — 36416 COLLJ CAPILLARY BLOOD SPEC: CPT | Performed by: NURSE PRACTITIONER

## 2020-11-30 PROCEDURE — 85610 PROTHROMBIN TIME: CPT | Performed by: NURSE PRACTITIONER

## 2020-11-30 ASSESSMENT — MIFFLIN-ST. JEOR: SCORE: 1813.97

## 2020-12-01 ENCOUNTER — TELEPHONE (OUTPATIENT)
Dept: OTHER | Facility: CLINIC | Age: 40
End: 2020-12-01

## 2020-12-01 NOTE — TELEPHONE ENCOUNTER
[facility]    Who is the name of the provider?:  Randolph      What is the location you see this provider at?: [place]    Reason for call:  Please let Dr. Dickson know 1) she is scheduled for MRV week of 12/7,  2) IUD is Mirena.      Can we leave a detailed message on this number?  YES

## 2020-12-01 NOTE — TELEPHONE ENCOUNTER
LM asking Paulina to call and confirm her in person appointment with Dr. Dickson on 12/14/2020 at 2:40.    Becca PAK

## 2020-12-01 NOTE — TELEPHONE ENCOUNTER
Follow up to 11/27/20    Please confirm:       In Clinic appointment on 12/14/20 at 2:40 to discuss labs that were drawn on 11/27/20.  No other labs/images necessary    Thank you.

## 2020-12-02 NOTE — TELEPHONE ENCOUNTER
RECORDS RECEIVED FROM: Internal   Date of Appt: 12/10/20   NOTES (FOR ALL VISITS) STATUS DETAILS   OFFICE NOTE from referring provider Internal SEE INPATIENT NOTES   OFFICE NOTE from other specialist N/A    DISCHARGE SUMMARY from hospital Internal [facility]:  10/17/20-10/21/20   DISCHARGE REPORT from the ER [facility]:  10/17/20   OPERATIVE REPORT N/A    MEDICATION LIST Internal    IMAGING  (FOR ALL VISITS)     EMG N/A    EEG N/A    LUMBAR PUNCTURE [facility]:  10/20/20   EMMY SCAN N/A    DEXA SCAN *NEUROSURGERY* N/A    ULTRASOUND (CAROTID BILAT) *VASCULAR* N/A    MRI (HEAD, NECK, SPINE) [facility]:  MRV Brain 10/18/20   XRAY (SPINE) *NEUROSURGERY* N/A    CT (HEAD, NECK, SPINE) [facility]:  CT Head Perfusion 10/17/20  CTV Head Neck 10/17/20  CT Head 10/17/20

## 2020-12-02 NOTE — TELEPHONE ENCOUNTER
I will discuss explantation of Mirena IUD with zenaida when I see her on 12/14. I will also address results of labs we aline as well as the MRV when I see her on 12/14/2020.

## 2020-12-09 ENCOUNTER — HOSPITAL ENCOUNTER (OUTPATIENT)
Dept: MRI IMAGING | Facility: CLINIC | Age: 40
End: 2020-12-09
Attending: STUDENT IN AN ORGANIZED HEALTH CARE EDUCATION/TRAINING PROGRAM
Payer: COMMERCIAL

## 2020-12-09 DIAGNOSIS — G08 CEREBRAL VENOUS THROMBOSIS OF CORTICAL VEIN: ICD-10-CM

## 2020-12-09 PROCEDURE — 70546 MR ANGIOGRAPH HEAD W/O&W/DYE: CPT

## 2020-12-09 PROCEDURE — 255N000002 HC RX 255 OP 636: Performed by: STUDENT IN AN ORGANIZED HEALTH CARE EDUCATION/TRAINING PROGRAM

## 2020-12-09 PROCEDURE — A9585 GADOBUTROL INJECTION: HCPCS | Performed by: STUDENT IN AN ORGANIZED HEALTH CARE EDUCATION/TRAINING PROGRAM

## 2020-12-09 PROCEDURE — 70546 MR ANGIOGRAPH HEAD W/O&W/DYE: CPT | Mod: 26 | Performed by: RADIOLOGY

## 2020-12-09 PROCEDURE — 70553 MRI BRAIN STEM W/O & W/DYE: CPT

## 2020-12-09 PROCEDURE — 70553 MRI BRAIN STEM W/O & W/DYE: CPT | Mod: 26 | Performed by: RADIOLOGY

## 2020-12-09 RX ORDER — GADOBUTROL 604.72 MG/ML
0.1 INJECTION INTRAVENOUS ONCE
Status: COMPLETED | OUTPATIENT
Start: 2020-12-09 | End: 2020-12-09

## 2020-12-09 RX ADMIN — GADOBUTROL 11 ML: 604.72 INJECTION INTRAVENOUS at 17:09

## 2020-12-10 ENCOUNTER — VIRTUAL VISIT (OUTPATIENT)
Dept: NEUROLOGY | Facility: CLINIC | Age: 40
End: 2020-12-10
Attending: STUDENT IN AN ORGANIZED HEALTH CARE EDUCATION/TRAINING PROGRAM
Payer: COMMERCIAL

## 2020-12-10 ENCOUNTER — PRE VISIT (OUTPATIENT)
Dept: NEUROLOGY | Facility: CLINIC | Age: 40
End: 2020-12-10

## 2020-12-10 ENCOUNTER — TELEPHONE (OUTPATIENT)
Dept: OPHTHALMOLOGY | Facility: CLINIC | Age: 40
End: 2020-12-10

## 2020-12-10 DIAGNOSIS — G93.2 INTRACRANIAL HYPERTENSION: ICD-10-CM

## 2020-12-10 DIAGNOSIS — G08 CEREBRAL VENOUS THROMBOSIS OF CORTICAL VEIN: Primary | ICD-10-CM

## 2020-12-10 PROCEDURE — 99214 OFFICE O/P EST MOD 30 MIN: CPT | Mod: 95 | Performed by: PSYCHIATRY & NEUROLOGY

## 2020-12-10 NOTE — PROGRESS NOTES
"Paulina Diana is a 40 year old female who is being evaluated via a billable video visit.      The patient has been notified of following:     \"This video visit will be conducted via a call between you and your physician/provider. We have found that certain health care needs can be provided without the need for an in-person physical exam.  This service lets us provide the care you need with a video conversation.  If a prescription is necessary we can send it directly to your pharmacy.  If lab work is needed we can place an order for that and you can then stop by our lab to have the test done at a later time.    Video visits are billed at different rates depending on your insurance coverage.  Please reach out to your insurance provider with any questions.    If during the course of the call the physician/provider feels a video visit is not appropriate, you will not be charged for this service.\"    Patient has given verbal consent for Video visit? Yes  How would you like to obtain your AVS? MyChart  If you are dropped from the video visit, the video invite should be resent to: Text to cell phone: [phone]  Will anyone else be joining your video visit? No        Video-Visit Details    Type of service:  Video Visit    Video Start Time: 12:24 PM  Video End Time: 13:24 PM    Originating Location (pt. Location): Home    Distant Location (provider location):  [facility] NEUROLOGY [facility]     Platform used for Video Visit: Rachel Earl MD      "

## 2020-12-10 NOTE — PROGRESS NOTES
__________________________________________________________      [facility]    [facility]  __________________________________________________________    Chief Complaint: Patient presents with:  Headache      History of Present Illness: Paulina Diana is a 40 year old female presenting for follow-up for CVST.     According to the patient she presented to hospital after experiencing sudden onset R temple severe throbbing headache with nausea vomiting photophobia and phonophobia. She slept for about 12 hours and woke up noticing R arm numbness from hand till elbow and she was dropping things this prompted her to contact her doctor who advised her to visit ER. While in ER she was found to have CVST.  She was hospitalized from 10/17/2020 till 10/21/2020. She was transferred to [facility] for further evaluation. CT head showed hyperdensities suggestive of sinus vein thrombosis, CTV revealed SSS, R transversae sinus, sigmoid sinuse and upper jugular thrombosis and questionable right veinous infarct.  CTP perfusion abnormalities did not correspond with arterial territories. Found to have CVST 2/2 prothrombotic state due to recent ectopic pregnancy for which she was given methotrexate. She was treated with heparin gtt charles intensity treatment, eventually transitioned warfarin with Lovenox bridge.      She reports two weeks prior to headache she had severe abdominal pain and was found to have ectopic pregnancy She was given two doses of Methotrexate. She was 7 to 8 weeks in pregnancy.     She is doing good. No headache. She reports bilateral hand and feet numbness and that that everything takes like a lemon. She is compliant with Coumadin with a stable INR. Last INR 2.7. She is on Diamox 500 mg BID.She was diagnosed with COVID on 8Th of Nov and had mild symptoms with just sinus infection and cough. She has recovered and doing well.    She denies any  episodes of weakness numbness tingling loss of vision blurry vision double vision loss of balance changes in chewing swallowing or speech that lasted for 24 hours and resolved. She also denies hx of recurrent sinus infections, lung infections.    Currently she denies any new headaches or recurrence of headache. She denies any visual obscurations and or pulsatile tinnitus. She denies any loss of vision blurry vision or changes with vision. She is seeing vascular medicine doctor and will follow up with him for test results. During this evaluation she has not seen eye doctor yet. She had repeat scans yesterday which showed Improved patency of the superior sagittal, right transverse, and right sigmoid sinuses, with persistent partially occlusive thrombus. Persistent optic nerve sheath dilation and posterior globes flattening suggesting continued high intracranial pressure. On my review leptomeningeal enhancement has improved or resolved.There was no comment about it in formal radiology report.    Work-up as stated below under Pertinent Investigations     Hypercoagulable workup as follows:   - Protein C  - Protein S  - Cardiolipin  - Antithrombin III  - Factor 2 and 5 mutation  - Factor 5 assay  - Factor 2 assay elevated  - Beta 2 glycoprotein  - Lupus anticoagulant  - Cardiolipin Ab negative  - Homocysteine elevated to 13.2  -MTHFR : heterozygous for the C677T mutation (thermolabile form) in   the MTHFR (5,10-methylenetetrahydrofolate reductase) gene     PIEDAD, SSA, SSB, DsDNA, ACE, RNP, anti Smith were  Negative (not elevated)    - CSF labs for leptomeningeal enhancement:  - Cell count + diff  - total protein  - glucose  - aerobic culture  - lyme disease negative  - HSV negative   - ACE slightly elevated to 2.7  - Oligoclonal Banding CSF negative  - EBV PCR CSF negative  - ACE CSF elevated 2.7  - Cytology CSF There is an increased population of small cells morphologically consistent  with lymphocytes. While there are  no  definitive features of hematolymphoid disorder, correlation with  associated flow cytometry ([id number]) is  recommended. Lymphocytosis maybe associated with infection or other  disorders other than hematolymphoid  diseases.  - Immuno: The total number of cells is low limiting the number of antibodies that  can be analyzed and limiting the   interpretation. Only B-cell antigens were evaluated. There is no immunophenotypic evidence of B-cell   non-Hodgkin lymphoma    Impression:   Problem List Items Addressed This Visit        Circulatory    Cerebral venous thrombosis of cortical vein - Primary    Relevant Orders    EYE ADULT REFERRAL      Other Visit Diagnoses     Intracranial hypertension        Relevant Orders    EYE ADULT REFERRAL        40 year old female with no significant past medical hx presented to hospital in October for new onset headache and R arm numbness and clumsiness found to have extensive CVST for which she required hospitalization. Prior to this event about 2 weeks she was diagnosed with ectopic pregnancy and was given Methotrexate for treatment. Work up during hospitalization showed extensive CVST confirmed on MR imaging plus leptomeningeal enhancement. Follow up testing for hypercoagulable state showed increased homocysteine level and heterozygous MTHFR mutation. There are reports to suggests excessive methotrexate toxicity in patient with this mutations and elevated risk of thrombosis in children with ALL. I will be curious to hear expert opinion of Dr Dickson on that. Is it possible that the pro thrombotic state of pregnancy and combination of the use of methotrexate and heterozygous mutation contributed to her CVST.    Further work for leptomeningeal enhancement is detailed above and essentially was negative. In report of yesterdays MRI there was no mention of leptomeningeal enhancement mentioned. She denies any red flags of history of headache and or focal neurological deficits.      Plan:   -  "Continue coumadin as prescribed with target INR 2-3  - Duration of anticoagulation to be determined after consultation with Dr Dickson. (patient is following up on Monday with him for results).  - Usual duration of AC in provoked( pregnancy plus ?use of methotrexate) conditions will be 6 months for unprovoked( heterozygous MTHFR mutation) it is life long.  - Referral for eye examination to determined severity of disc edema and get baseline visual field exam.  - Continue Diamox 500 mg BID for now.  - Bilateral hand and feet numbness and dysguesia( lemon taste) are likely side effects of Diamox.These are the only complain she has but willing to continue. I will recommend to continue it until eye examination is completed.  -If findings are reassuring we can consider decreasing dose of Diamox.   - Counseled her about potential long term complications like seizures or dural AV fistula.  - She was reminded to drink water and stay hydrated.  - Counseled and update her to go to ER if there is recurrence of new headache, if headache worsens with sneezing coughing or straining. Or of there are new focal neurological deficits.  - Follow up in 3 months    She will call us with any questions.  For any acute neurologic deficits she was advised to  go directly to the hospital rather than call the clinic.    Ghada Earl MD  Neurology  12/10/2020 12:18 PM  To page me or covering stroke neurology team member, click here: [facility]  Choose \"On Call\" tab at top, then search dropdown box for \"Neurology Adult\" & press Enter, look for Neuro ICU/Stroke    ___________________________________________________________________    Current Medications  Current Outpatient Medications   Medication Sig     acetaZOLAMIDE (DIAMOX) 250 MG tablet Take 2 tablets (500 mg) by mouth 2 times daily     levothyroxine (SYNTHROID/LEVOTHROID) 112 MCG tablet Take 2 tablets (224 mcg) by mouth daily     warfarin ANTICOAGULANT (COUMADIN) 5 MG tablet Take 1-1.5 tablets " (5-7.5 mg) by mouth daily 7.5mg six days per week, 5mg one day per week     No current facility-administered medications for this visit.        Past Medical History  Past Medical History:   Diagnosis Date     Ectopic pregnancy, tubal 1/28/2018     Thyroid disease     hypothyroid       Social History  Social History     Tobacco Use     Smoking status: Never Smoker     Smokeless tobacco: Never Used     Tobacco comment:  on occasion   Substance Use Topics     Alcohol use: No     Alcohol/week: 0.0 standard drinks     Comment: rare     Drug use: No       Family History  No family history on file.    ROS: 10 point relevant ROS neg other than the symptoms noted above in the HPI.    Physical Exam    LMP 06/16/2020 (Exact Date)     General:  no acute distress  HEENT:  normocephalic/atraumatic  Pulmonary:  no respiratory distress    Neurologic  Mental Status:  alert, oriented x 3, follows commands, speech clear and fluent, naming and repetition normal  Cranial Nerves:  EOMI with normal smooth pursuit, facial movements symmetric, hearing not formally tested but intact to conversation, no dysarthria, shoulder shrug equal bilaterally, tongue protrusion midline  Motor:  no abnormal movements, able to move all limbs antigravity spontaneously with no signs of hemiparesis observed, no pronator drift  Reflexes:  unable to test (telestroke)  Sensory:  unable to test (telestroke)  Coordination:  normal finger-to-nose and heel-to-shin bilaterally without dysmetria, rapid alternating movements symmetric  Station/Gait:  unable to test (telestroke)    Neuroimaging: as per HPI. I personally reviewed those images and it showed extensive CVST on initial MRI which has improved. Leptomeningeal enhancement on initial MRI , on follow up MRI there is not much enhancement appreciated.    Labs:    Recent Labs   Lab Test 11/30/20  1053 11/16/20 11/09/20  1017   INR 2.7 2.9 1.9*        Recent Labs   Lab Test 10/18/20  0024 12/12/19  1559   CHOL  163 185   HDL 53 62   LDL 78 111*   TRIG 165* 61       No lab results found.    Recent Labs   Lab Test 10/18/20  0024 10/17/20  1752   TROPI <0.015 <0.015

## 2020-12-10 NOTE — TELEPHONE ENCOUNTER
Reviewed with Neuro-ophthalmology     Pt has not had formal eye exam,.  No vision changes noted  Cerebral venous thrombosis diagnosis    Ok to schedule in continuity/general clinic next week for formal testing/visual field    Left message with direct number for assistance scheduling at [phone]    Alejandro Olivas RN 5:14 PM 12/10/20            [facility]    Phone Message    May a detailed message be left on voicemail: no     Reason for Call: Appointment Intake    Referring Provider Name: Ghada Earl MD in [facility] NEUROLOGY  Diagnosis and/or Symptoms:  Pt with extensive CVST. Exam for disce edema and visual field testing     Action Taken: Message routed to:  [facility] [[id number]]    Travel Screening: Not Applicable

## 2020-12-10 NOTE — PATIENT INSTRUCTIONS
Continue Coumadin  Continue Diamox 500 mg BID  Referral for eye has been made  Follow up with Dr Dickson for testing results and discuss about plan for Anticoagulation  Follow up in 3 months

## 2020-12-10 NOTE — PROGRESS NOTES
"Paulina Diana is a 40 year old female who is being evaluated via a billable video visit.      The patient has been notified of following:     \"This video visit will be conducted via a call between you and your physician/provider. We have found that certain health care needs can be provided without the need for an in-person physical exam.  This service lets us provide the care you need with a video conversation.  If a prescription is necessary we can send it directly to your pharmacy.  If lab work is needed we can place an order for that and you can then stop by our lab to have the test done at a later time.    Video visits are billed at different rates depending on your insurance coverage.  Please reach out to your insurance provider with any questions.    If during the course of the call the physician/provider feels a video visit is not appropriate, you will not be charged for this service.\"    Patient has given verbal consent for Video visit?YES  How would you like to obtain your AVS? Mychart        Video-Visit Details    Type of service:  Video Visit    Video Start Time:   Video End Time:     Originating Location (pt. Location): Home    Distant Location (provider location):  [facility] [place]     Platform used for Video Visit: Arabella Gonzalez, EMT        "

## 2020-12-10 NOTE — LETTER
12/10/2020       RE: Paulina Diana  [id number] [address]  [facility] [address]     Dear Colleague,    Thank you for referring your patient, Paulina Diana, to the [facility]UR[facility] [place] at [facility]. Please see a copy of my visit note below.    __________________________________________________________      [facility]    at [facility]  __________________________________________________________    Chief Complaint: Patient presents with:  Headache      History of Present Illness: Paulina Diana is a 40 year old female presenting for follow-up for CVST.     According to the patient she presented to hospital after experiencing sudden onset R temple severe throbbing headache with nausea vomiting photophobia and phonophobia. She slept for about 12 hours and woke up noticing R arm numbness from hand till elbow and she was dropping things this prompted her to contact her doctor who advised her to visit ER. While in ER she was found to have CVST.  She was hospitalized from 10/17/2020 till 10/21/2020. She was transferred to [facility] for further evaluation. CT head showed hyperdensities suggestive of sinus vein thrombosis, CTV revealed SSS, R transversae sinus, sigmoid sinuse and upper jugular thrombosis and questionable right veinous infarct.  CTP perfusion abnormalities did not correspond with arterial territories. Found to have CVST 2/2 prothrombotic state due to recent ectopic pregnancy for which she was given methotrexate. She was treated with heparin gtt charles intensity treatment, eventually transitioned warfarin with Lovenox bridge.      She reports two weeks prior to headache she had severe abdominal pain and was found to have ectopic pregnancy She was given two doses of Methotrexate. She was 7 to 8 weeks in pregnancy.     She is doing good. No headache. She reports bilateral hand and feet  numbness and that that everything takes like a lemon. She is compliant with Coumadin with a stable INR. Last INR 2.7. She is on Diamox 500 mg BID.She was diagnosed with COVID on 8Th of Nov and had mild symptoms with just sinus infection and cough. She has recovered and doing well.    She denies any episodes of weakness numbness tingling loss of vision blurry vision double vision loss of balance changes in chewing swallowing or speech that lasted for 24 hours and resolved. She also denies hx of recurrent sinus infections, lung infections.    Currently she denies any new headaches or recurrence of headache. She denies any visual obscurations and or pulsatile tinnitus. She denies any loss of vision blurry vision or changes with vision. She is seeing vascular medicine doctor and will follow up with him for test results. During this evaluation she has not seen eye doctor yet. She had repeat scans yesterday which showed Improved patency of the superior sagittal, right transverse, and right sigmoid sinuses, with persistent partially occlusive thrombus. Persistent optic nerve sheath dilation and posterior globes flattening suggesting continued high intracranial pressure. On my review leptomeningeal enhancement has improved or resolved.There was no comment about it in formal radiology report.    Work-up as stated below under Pertinent Investigations     Hypercoagulable workup as follows:   - Protein C  - Protein S  - Cardiolipin  - Antithrombin III  - Factor 2 and 5 mutation  - Factor 5 assay  - Factor 2 assay elevated  - Beta 2 glycoprotein  - Lupus anticoagulant  - Cardiolipin Ab negative  - Homocysteine elevated to 13.2  -MTHFR : heterozygous for the C677T mutation (thermolabile form) in   the MTHFR (5,10-methylenetetrahydrofolate reductase) gene     PIEDAD, SSA, SSB, DsDNA, ACE, RNP, anti Smith were  Negative (not elevated)    - CSF labs for leptomeningeal enhancement:  - Cell count + diff  - total protein  - glucose  -  aerobic culture  - lyme disease negative  - HSV negative   - ACE slightly elevated to 2.7  - Oligoclonal Banding CSF negative  - EBV PCR CSF negative  - ACE CSF elevated 2.7  - Cytology CSF There is an increased population of small cells morphologically consistent  with lymphocytes. While there are  no definitive features of hematolymphoid disorder, correlation with  associated flow cytometry ([id number]) is  recommended. Lymphocytosis maybe associated with infection or other  disorders other than hematolymphoid  diseases.  - Immuno: The total number of cells is low limiting the number of antibodies that  can be analyzed and limiting the   interpretation. Only B-cell antigens were evaluated. There is no immunophenotypic evidence of B-cell   non-Hodgkin lymphoma    Impression:   Problem List Items Addressed This Visit        Circulatory    Cerebral venous thrombosis of cortical vein - Primary    Relevant Orders    EYE ADULT REFERRAL      Other Visit Diagnoses     Intracranial hypertension        Relevant Orders    EYE ADULT REFERRAL        40 year old female with no significant past medical hx presented to hospital in October for new onset headache and R arm numbness and clumsiness found to have extensive CVST for which she required hospitalization. Prior to this event about 2 weeks she was diagnosed with ectopic pregnancy and was given Methotrexate for treatment. Work up during hospitalization showed extensive CVST confirmed on MR imaging plus leptomeningeal enhancement. Follow up testing for hypercoagulable state showed increased homocysteine level and heterozygous MTHFR mutation. There are reports to suggests excessive methotrexate toxicity in patient with this mutations and elevated risk of thrombosis in children with ALL. I will be curious to hear expert opinion of Dr Dickson on that. Is it possible that the pro thrombotic state of pregnancy and combination of the use of methotrexate and heterozygous mutation  "contributed to her CVST.    Further work for leptomeningeal enhancement is detailed above and essentially was negative. In report of yesterdays MRI there was no mention of leptomeningeal enhancement mentioned. She denies any red flags of history of headache and or focal neurological deficits.      Plan:   - Continue coumadin as prescribed with target INR 2-3  - Duration of anticoagulation to be determined after consultation with Dr Dickson. (patient is following up on Monday with him for results).  - Usual duration of AC in provoked( pregnancy plus ?use of methotrexate) conditions will be 6 months for unprovoked( heterozygous MTHFR mutation) it is life long.  - Referral for eye examination to determined severity of disc edema and get baseline visual field exam.  - Continue Diamox 500 mg BID for now.  - Bilateral hand and feet numbness and dysguesia( lemon taste) are likely side effects of Diamox.These are the only complain she has but willing to continue. I will recommend to continue it until eye examination is completed.  -If findings are reassuring we can consider decreasing dose of Diamox.   - Counseled her about potential long term complications like seizures or dural AV fistula.  - She was reminded to drink water and stay hydrated.  - Counseled and update her to go to ER if there is recurrence of new headache, if headache worsens with sneezing coughing or straining. Or of there are new focal neurological deficits.  - Follow up in 3 months    She will call us with any questions.  For any acute neurologic deficits she was advised to  go directly to the hospital rather than call the clinic.    Ghada Earl MD  Neurology  12/10/2020 12:18 PM  To page me or covering stroke neurology team member, click here: AMCOM  Choose \"On Call\" tab at top, then search dropdown box for \"Neurology Adult\" & press Enter, look for Neuro ICU/Stroke    ___________________________________________________________________    Current " Medications  Current Outpatient Medications   Medication Sig     acetaZOLAMIDE (DIAMOX) 250 MG tablet Take 2 tablets (500 mg) by mouth 2 times daily     levothyroxine (SYNTHROID/LEVOTHROID) 112 MCG tablet Take 2 tablets (224 mcg) by mouth daily     warfarin ANTICOAGULANT (COUMADIN) 5 MG tablet Take 1-1.5 tablets (5-7.5 mg) by mouth daily 7.5mg six days per week, 5mg one day per week     No current facility-administered medications for this visit.        Past Medical History  Past Medical History:   Diagnosis Date     Ectopic pregnancy, tubal 1/28/2018     Thyroid disease     hypothyroid       Social History  Social History     Tobacco Use     Smoking status: Never Smoker     Smokeless tobacco: Never Used     Tobacco comment:  on occasion   Substance Use Topics     Alcohol use: No     Alcohol/week: 0.0 standard drinks     Comment: rare     Drug use: No       Family History  No family history on file.    ROS: 10 point relevant ROS neg other than the symptoms noted above in the HPI.    Physical Exam    LMP 06/16/2020 (Exact Date)     General:  no acute distress  HEENT:  normocephalic/atraumatic  Pulmonary:  no respiratory distress    Neurologic  Mental Status:  alert, oriented x 3, follows commands, speech clear and fluent, naming and repetition normal  Cranial Nerves:  EOMI with normal smooth pursuit, facial movements symmetric, hearing not formally tested but intact to conversation, no dysarthria, shoulder shrug equal bilaterally, tongue protrusion midline  Motor:  no abnormal movements, able to move all limbs antigravity spontaneously with no signs of hemiparesis observed, no pronator drift  Reflexes:  unable to test (telestroke)  Sensory:  unable to test (telestroke)  Coordination:  normal finger-to-nose and heel-to-shin bilaterally without dysmetria, rapid alternating movements symmetric  Station/Gait:  unable to test (telestroke)    Neuroimaging: as per HPI. I personally reviewed those images and it showed  "extensive CVST on initial MRI which has improved. Leptomeningeal enhancement on initial MRI , on follow up MRI there is not much enhancement appreciated.    Labs:    Recent Labs   Lab Test 11/30/20  1053 11/16/20 11/09/20  1017   INR 2.7 2.9 1.9*        Recent Labs   Lab Test 10/18/20  0024 12/12/19  1559   CHOL 163 185   HDL 53 62   LDL 78 111*   TRIG 165* 61       No lab results found.    Recent Labs   Lab Test 10/18/20  0024 10/17/20  1752   TROPI <0.015 <0.015         Paulina Diana is a 40 year old female who is being evaluated via a billable video visit.      The patient has been notified of following:     \"This video visit will be conducted via a call between you and your physician/provider. We have found that certain health care needs can be provided without the need for an in-person physical exam.  This service lets us provide the care you need with a video conversation.  If a prescription is necessary we can send it directly to your pharmacy.  If lab work is needed we can place an order for that and you can then stop by our lab to have the test done at a later time.    Video visits are billed at different rates depending on your insurance coverage.  Please reach out to your insurance provider with any questions.    If during the course of the call the physician/provider feels a video visit is not appropriate, you will not be charged for this service.\"    Patient has given verbal consent for Video visit?YES  How would you like to obtain your AVS? Mychart      Video-Visit Details    Type of service:  Video Visit    Video Start Time: 12:24 PM  Video End Time: 13:24 PM    Originating Location (pt. Location): Home    Distant Location (provider location):  [facility] NEUROLOGY [facility]     Platform used for Video Visit: Rachel Earl MD      "

## 2020-12-14 ENCOUNTER — OFFICE VISIT (OUTPATIENT)
Dept: OTHER | Facility: CLINIC | Age: 40
End: 2020-12-14
Attending: INTERNAL MEDICINE
Payer: COMMERCIAL

## 2020-12-14 VITALS
BODY MASS INDEX: 41.6 KG/M2 | SYSTOLIC BLOOD PRESSURE: 122 MMHG | HEART RATE: 58 BPM | OXYGEN SATURATION: 100 % | DIASTOLIC BLOOD PRESSURE: 72 MMHG | WEIGHT: 250 LBS | TEMPERATURE: 97.1 F

## 2020-12-14 DIAGNOSIS — G08 CEREBRAL VENOUS THROMBOSIS OF CORTICAL VEIN: Primary | ICD-10-CM

## 2020-12-14 PROCEDURE — 99215 OFFICE O/P EST HI 40 MIN: CPT | Performed by: INTERNAL MEDICINE

## 2020-12-14 PROCEDURE — G0463 HOSPITAL OUTPT CLINIC VISIT: HCPCS

## 2020-12-14 NOTE — PROGRESS NOTES
Paulina Diana is a 40 year old female who is presenting at the current time to discuss her diagnosi(es) of     Cerebral venous thrombosis of cortical vein  .       HPI: Paulina Diana is a 40 year old [id number] female admitted 8/10/20 to [facility] for presumed ectopic cornu pregnancy S/P IM MTX w/ resolution of ectopic w/o prior h/o VTE who then presented to [facility] 10/17/2020 with persistent headache x 3 days that woke up her from sleep and was found to have CVST. Symptoms included photophobia, nausea and vomiting. In addition she had numbness and clumsiness of right hand. She was transferred to [facility] for further evaluation. CT head showed hyperdensities suggestive of sinus vein thrombosis, CTV revealed SSS, R transversae sinus, sigmoid sinus and upper jugular thrombosis and questionable right venous infarct.  CT perfusion abnormalities did not correspond with arterial territories. Found to have CVST 2/2 prothrombotic state due to recent ectopic pregnancy. She was treated with heparin gtt high intensity treatment, and eventually transitioned to warfarin with Lovenox bridge.  Also found incidentally during workup was leptomeningeal enhancement on MRI Brain, being worked up by neurology.    She is a prothrombin heterozygote with elevated homocysteine.       Since having been last seen, MRV has documented recanalization of the CVSTs, and her MTHFR mutation was noted to be normal. Complement levels drawn due to ANCA elevation were normal. She has no residual neurologic sxs, and no headaches      Review Of Systems  Skin: negative  Eyes: negative  Ears/Nose/Throat: negative  Respiratory: No shortness of breath, dyspnea on exertion, cough, or hemoptysis  Cardiovascular: negative  Gastrointestinal: negative  Genitourinary: negative  Musculoskeletal: negative  Neurologic: negative  Psychiatric: negative  Hematologic/Lymphatic/Immunologic: negative  Endocrine: negative      PAST MEDICAL HISTORY:                  Past Medical History:    Diagnosis Date     Ectopic pregnancy, tubal 1/28/2018     Thyroid disease     hypothyroid       PAST SURGICAL HISTORY:                  Past Surgical History:   Procedure Laterality Date     DILATION AND CURETTAGE SUCTION N/A 5/27/2016    Procedure: DILATION AND CURETTAGE SUCTION;  Surgeon: Natahca Goyal MD;  Location: [facility]     ENT SURGERY      septal repair     LAPAROSCOPIC EVACUATION ECTOPIC PREGNANCY Left 1/28/2018    Procedure: LAPAROSCOPIC EVACUATION ECTOPIC PREGNANCY;  LAPAROSCOPIC SINGLE SITE EVACUATION OF ECTOPIC PREGNANCY, LEFT SALPINGECTOMY;  Surgeon: Erum Boles MD;  Location:  OR     LAPAROSCOPIC SALPINGECTOMY Left 1/28/2018    Procedure: LAPAROSCOPIC SALPINGECTOMY;;  Surgeon: Erum Boles MD;  Location:  OR     LAPAROSCOPIC SALPINGO-OOPHORECTOMY  11/2/2013    Procedure: LAPAROSCOPIC SALPINGO-OOPHORECTOMY;  Single port laparoscopic, Evacuation of hemo-peritonium and Products of Conception;  Surgeon: Peggy Thornton MD;  Location:  OR     NOSE SURGERY      age 12       CURRENT MEDICATIONS:                  Current Outpatient Medications   Medication Sig Dispense Refill     acetaZOLAMIDE (DIAMOX) 250 MG tablet Take 2 tablets (500 mg) by mouth 2 times daily 120 tablet 3     levothyroxine (SYNTHROID/LEVOTHROID) 112 MCG tablet Take 2 tablets (224 mcg) by mouth daily 120 tablet 2     warfarin ANTICOAGULANT (COUMADIN) 5 MG tablet Take 1-1.5 tablets (5-7.5 mg) by mouth daily 7.5mg six days per week, 5mg one day per week 100 tablet 3       ALLERGIES:                  Allergies   Allergen Reactions     Ceclor Cd [Cefaclor Monohydrate] Hives       SOCIAL HISTORY:                  Social History     Socioeconomic History     Marital status:      Spouse name: Not on file     Number of children: Not on file     Years of education: Not on file     Highest education level: Not on file   Occupational History     Not on file   Social Needs     Financial resource strain: Not  on file     Food insecurity     Worry: Not on file     Inability: Not on file     Transportation needs     Medical: Not on file     Non-medical: Not on file   Tobacco Use     Smoking status: Never Smoker     Smokeless tobacco: Never Used     Tobacco comment:  on occasion   Substance and Sexual Activity     Alcohol use: No     Alcohol/week: 0.0 standard drinks     Comment: rare     Drug use: No     Sexual activity: Yes     Partners: Male   Lifestyle     Physical activity     Days per week: Not on file     Minutes per session: Not on file     Stress: Not on file   Relationships     Social connections     Talks on phone: Not on file     Gets together: Not on file     Attends Scientologist service: Not on file     Active member of club or organization: Not on file     Attends meetings of clubs or organizations: Not on file     Relationship status: Not on file     Intimate partner violence     Fear of current or ex partner: Not on file     Emotionally abused: Not on file     Physically abused: Not on file     Forced sexual activity: Not on file   Other Topics Concern     Not on file   Social History Narrative     Not on file       FAMILY HISTORY:                 No family history on file.      Physical exam Reveals:    O/P: WNL  HEENT: WNL  NECK: No JVD, thyromegaly, or lymphadenopathy  HEART: RRR, no murmurs, gallops, or rubs  LUNGS: CTA bilaterally without rales, wheezes, or rhonchi  GI: NABS, nondistended, nontender, soft  EXT:without cyanosis, clubbing, or edema  NEURO: nonfocal  : no flank tenderness      Examination: 12/10/2020 8:13 AM  MRI of the brain without and with intravenous contrast  MR venography without and with intravenous contrast     HISTORY: Venous sinus thrombosis, leptomeningeal enhancement.     COMPARISON: MRI/MRV 12/9/2020. CT venogram 10/17/2020.     TECHNIQUE: Multiplanar multisequence MRI of the brain without and with  intravenous contrast. Noncontrast two-dimensional time-of-flight  and  three-dimensional contrast-enhanced MR venography were also performed.     FINDINGS: Since prior study, there has been improvement in the patency  of the previously thrombosed superior sagittal sinus, right transverse  sinus, sigmoid sinus, and particularly upper right internal jugular  vein. However, there is new stenosis of the upper right internal  jugular vein just inferior to the skull base. Dilated optic nerve  sheaths persist, with continued flattening of the posterior globes,  consistent with persistent increased intracranial pressure. No  intracranial mass, hydrocephalus, or restricted diffusion.                                                                      Impression: Improved patency of the superior sagittal, right  transverse, and right sigmoid sinuses, with persistent partially  occlusive thrombus. Persistent optic nerve sheath dilation and  posterior globes flattening suggesting continued high intracranial  pressure.     PAOLO FERNANDEZ MD    Component      Latest Ref Rng & Units 11/27/2020   Complement Activity Total Turbidimetric      38.7 - 89.9 U/mL 75.3   Complement C4      13 - 39 mg/dL 25   Complement C3      81 - 157 mg/dL 127   Copath Report       Patient Name: WALTER HAYWARD . . .   D-Dimer      0.0 - 0.50 ug/ml FEU 0.3   INR      2 - 3      Component      Latest Ref Rng & Units 11/30/2020   Complement Activity Total Turbidimetric      38.7 - 89.9 U/mL    Complement C4      13 - 39 mg/dL    Complement C3      81 - 157 mg/dL    Copath Report          D-Dimer      0.0 - 0.50 ug/ml FEU    INR      2 - 3 2.7       Patient Name: WALTER HAYWARD   MR#: [id number]   Specimen #: [id number]   Collected: 11/27/2020 10:16   Received: 11/27/2020 12:46   Reported: 11/30/2020 14:32   Ordering Phy(s): ANSHU SOLIS     For improved result formatting, select 'View Enhanced Report Format' under    Linked Documents section.   _________________________________________     TEST(S) REQUESTED:    Methylene Tetrahydrofolate Reductase Molecular Analysis     SPECIMEN DESCRIPTION:   Blood     RESULTS:     MTHFR (5,10-methylenetetrahydrofolate reductase) Gene:     MTHFR Gene C677T RESULTS: HETEROZYGOTE     INTERPRETATION:   The patient is heterozygous for the C677T mutation (thermolabile form) in   the MTHFR   (5,10-methylenetetrahydrofolate reductase) gene. Heterozygosity for MTHFR   does not predispose patients to   hyperhomocysteinemia. Patients with heterozygous or homozygous mutations   for the MTHFR C677T mutation may have   more toxicity with methotrexate therapy and children undergoing treatment   for ALL with thrombotic risk   (including these mutations)may have a higher risk of thrombosis.     For genetic counseling inquiries please contact Otilio Marbella at   [phone].     METHODOLOGY: The region of genomic DNA was isolated containing the C677T   mutation (thermolabile form) of the   MTHFR gene and amplified using the polymerase chain reaction. The   amplified products were digested with   restriction endonuclease Hinf I and products were analyzed by gel   electrophoresis.     COMMENTS:   If a patient is the recipient of an allogeneic bone marrow transplant,   this test must be done on a   pre-transplant sample or buccal swab.  A previous allogeneic bone marrow   transplant will interfere with test   results.  Call the [facility]([phone]) for   instructions on sample collection for these   patients.     This test was developed and its performance characteristics determined by   [facility]. It has not been cleared or approved by the FDA.   The laboratory is regulated under CLIA   as qualified to perform high-complexity testing. This test is used for   clinical purposes. It should not be   regarded as investigational or for research.     A resident/fellow in an accredited training program was involved in the   selection of testing,  review of   laboratory data, and/or interpretation of this case.  I, as the senior   physician, attest that I: (i) confirmed   appropriate testing, (ii) examined the relevant raw data for the   specimen(s); and (iii) rendered or confirmed   the interpretation(s).     Electronically Signed Out By:   RIGO Hollingsworth     CPT Codes:   A: 57053-SEPGNCP, -PANYBG     TESTING LAB LOCATION:   [address] [address] [phone]     A/P:    (G08) Cerebral venous thrombosis of cortical vein  (primary encounter diagnosis)  Comment: She is ACd with warfarin and is tolerating this w/o bleeding or bruising. She is a prothrombin and MTHFR coheterozygote. Vitamin supplementation has not been shown to reduce recurrent VTE. She was also previously recently pregnant. She has a Mirena IUD  Plan: I would favor at least 12 months AC with warfarin. I would not switch her to Xarelto. I would check a d dimer and repeat MRV in October, 2021, and see her back one week later. I would favor longer term AC in this individual beyond one year if she has any other intervening clinical thrombotic difficulties.       Greater than one half the fortyfive minutes total spent on the pt's visit were spent providing education and counselling to the patient regarding the above matters.

## 2020-12-14 NOTE — PROGRESS NOTES
"Paulina Diana is a 40 year old female who presents for:  Chief Complaint   Patient presents with     RECHECK     f/u to 11/27/20. Labs completed 11/27 co        Vitals:    Vitals:    12/14/20 1447   BP: 122/72   BP Location: Right arm   Patient Position: Chair   Cuff Size: Adult Large   Pulse: 58   Temp: 97.1  F (36.2  C)   TempSrc: Temporal   SpO2: 100%   Weight: 250 lb (113.4 kg)       BMI:  Estimated body mass index is 41.6 kg/m  as calculated from the following:    Height as of 11/30/20: 5' 5\" (1.651 m).    Weight as of this encounter: 250 lb (113.4 kg).    Pain Score:  Data Unavailable        Kiya Pitts MA    "

## 2020-12-21 VITALS — SYSTOLIC BLOOD PRESSURE: 120 MMHG | TEMPERATURE: 98.1 F | DIASTOLIC BLOOD PRESSURE: 70 MMHG

## 2020-12-21 DIAGNOSIS — Z79.01 LONG TERM CURRENT USE OF ANTICOAGULANT THERAPY: ICD-10-CM

## 2020-12-21 DIAGNOSIS — Z79.01 ANTICOAGULATION MONITORING, INR RANGE 2-3: ICD-10-CM

## 2020-12-21 DIAGNOSIS — G08 CEREBRAL VENOUS THROMBOSIS OF CORTICAL VEIN: ICD-10-CM

## 2020-12-21 LAB — INR PPP: 1.9 (ref 2–3)

## 2020-12-21 PROCEDURE — 36416 COLLJ CAPILLARY BLOOD SPEC: CPT | Performed by: NURSE PRACTITIONER

## 2020-12-21 PROCEDURE — 99212 OFFICE O/P EST SF 10 MIN: CPT | Performed by: NURSE PRACTITIONER

## 2020-12-21 PROCEDURE — 85610 PROTHROMBIN TIME: CPT | Performed by: NURSE PRACTITIONER

## 2020-12-21 NOTE — PROGRESS NOTES
Problem(s) Oriented visit        SUBJECTIVE:                                                    Paulina Diana is a 40 year old female who presents to clinic today for the following health issues :    CC: Cerebral sinus venous thrombosis    Anticoagulated with warfarin for CSVT. She has been taking warfarin 5mg twice weekly, 7.5mg the remaining days. Goal INR 2-3, today's INR is 1.9. No missed doses. No changes in diet, medications, or alcohol. No recent illnesses. No signs or symptoms of bleeding or clotting. No headaches.      Problem list, Medication list, Allergies, and Medical/Social/Surgical histories reviewed in [facility] and updated as appropriate.   Additional history: as documented    ROS:  Gen, neuro, heme negative except as listed per HPI    Histories:   Patient Active Problem List   Diagnosis     Atypical nevus     Acquired hypothyroidism     Dermatofibroma of face     Melanocytic nevus, unspecified location     Morbid obesity (H)     Cerebral venous thrombosis of cortical vein     Long term current use of anticoagulant therapy     Anticoagulation monitoring, INR range 2-3     Past Surgical History:   Procedure Laterality Date     DILATION AND CURETTAGE SUCTION N/A 5/27/2016    Procedure: DILATION AND CURETTAGE SUCTION;  Surgeon: Natacha Goyal MD;  Location: [facility]     ENT SURGERY      septal repair     LAPAROSCOPIC EVACUATION ECTOPIC PREGNANCY Left 1/28/2018    Procedure: LAPAROSCOPIC EVACUATION ECTOPIC PREGNANCY;  LAPAROSCOPIC SINGLE SITE EVACUATION OF ECTOPIC PREGNANCY, LEFT SALPINGECTOMY;  Surgeon: Erum Boles MD;  Location:  OR     LAPAROSCOPIC SALPINGECTOMY Left 1/28/2018    Procedure: LAPAROSCOPIC SALPINGECTOMY;;  Surgeon: Erum Boles MD;  Location:  OR     LAPAROSCOPIC SALPINGO-OOPHORECTOMY  11/2/2013    Procedure: LAPAROSCOPIC SALPINGO-OOPHORECTOMY;  Single port laparoscopic, Evacuation of hemo-peritonium and Products of Conception;  Surgeon: Peggy Thornton MD;   Location: [facility] OR     NOSE SURGERY      age 12       Social History     Tobacco Use     Smoking status: Never Smoker     Smokeless tobacco: Never Used     Tobacco comment:  on occasion   Substance Use Topics     Alcohol use: No     Alcohol/week: 0.0 standard drinks     Comment: rare     No family history on file.      Current Outpatient Medications   Medication Sig Dispense Refill     acetaZOLAMIDE (DIAMOX) 250 MG tablet Take 2 tablets (500 mg) by mouth 2 times daily 120 tablet 3     levothyroxine (SYNTHROID/LEVOTHROID) 112 MCG tablet Take 2 tablets (224 mcg) by mouth daily 120 tablet 2     warfarin ANTICOAGULANT (COUMADIN) 5 MG tablet Take 1-1.5 tablets (5-7.5 mg) by mouth daily 7.5mg six days per week, 5mg one day per week 100 tablet 3       OBJECTIVE:                                                    Physical Exam:    /70   Temp 98.1  F (36.7  C)   LMP 06/16/2020 (Exact Date)     CONSTITUTIONAL: Alert non-toxic appearing female in no acute distress  RESPIRATORY: Respirations unlabored  MUSCULOSKELETAL: Moves all extremities, no obvious muscle wasting  NEUROLOGIC: grossly intact, normal gait  SKIN: Appropriate color for race, warm and dry, no suspicious lesions or rashes to unclothed skin  PSYCHIATRIC: Pleasant and interactive, affect euthymic, makes appropriate eye contact, thought process logical       ASSESSMENT/PLAN:                                                        Paulina was seen today for inr followup and headache.    Diagnoses and all orders for this visit:    Cerebral venous thrombosis of cortical vein  Long term current use of anticoagulant therapy  Anticoagulation monitoring, INR range 2-3  -     Prothrombin - INR ([facility])    INR 1.9. Change to warfarin 7.5mg x6 days per week and 5mg once per week. Recheck in one week.      Patient needs assistance with ADLs: none identified today  Patient needs assistance with iADLs: none identified today    The following health maintenance items are  reviewed in Epic and correct as of today:  Health Maintenance   Topic Date Due     PREVENTIVE CARE VISIT  1982     EYE EXAM  1982     HEPATITIS C SCREENING  06/03/2000     PAP  04/01/2018     DTAP/TDAP/TD IMMUNIZATION (3 - Td) 09/04/2022     COLORECTAL CANCER SCREENING  10/20/2026     PHQ-2  Completed     INFLUENZA VACCINE  Addressed     Pneumococcal Vaccine: Pediatrics (0 to 5 Years) and At-Risk Patients (6 to 64 Years)  Aged Out     IPV IMMUNIZATION  Aged Out     MENINGITIS IMMUNIZATION  Aged Out     HEPATITIS B IMMUNIZATION  Aged Out     HIV SCREENING  Discontinued       Patient Instructions   5mg Thursday, 7.5mg all other days  Recheck in one week      ABDON Hodges CNP  [facility]  Family Practice  [facility]  [phone]    For any issues my office # is [phone]

## 2020-12-22 ENCOUNTER — MYC MEDICAL ADVICE (OUTPATIENT)
Dept: FAMILY MEDICINE | Facility: CLINIC | Age: 40
End: 2020-12-22

## 2020-12-28 ENCOUNTER — TELEPHONE (OUTPATIENT)
Dept: OTHER | Facility: CLINIC | Age: 40
End: 2020-12-28

## 2020-12-28 ENCOUNTER — OFFICE VISIT (OUTPATIENT)
Dept: FAMILY MEDICINE | Facility: CLINIC | Age: 40
End: 2020-12-28

## 2020-12-28 VITALS
DIASTOLIC BLOOD PRESSURE: 72 MMHG | OXYGEN SATURATION: 99 % | SYSTOLIC BLOOD PRESSURE: 118 MMHG | BODY MASS INDEX: 41.65 KG/M2 | TEMPERATURE: 97.9 F | WEIGHT: 250 LBS | HEIGHT: 65 IN | HEART RATE: 76 BPM

## 2020-12-28 DIAGNOSIS — G08 CEREBRAL VENOUS THROMBOSIS OF CORTICAL VEIN: Primary | ICD-10-CM

## 2020-12-28 DIAGNOSIS — Z79.01 ANTICOAGULATION MONITORING, INR RANGE 2-3: ICD-10-CM

## 2020-12-28 DIAGNOSIS — Z79.01 LONG TERM CURRENT USE OF ANTICOAGULANT THERAPY: ICD-10-CM

## 2020-12-28 LAB — INR PPP: 1.7 (ref 2–3)

## 2020-12-28 PROCEDURE — 85610 PROTHROMBIN TIME: CPT | Performed by: NURSE PRACTITIONER

## 2020-12-28 PROCEDURE — 36416 COLLJ CAPILLARY BLOOD SPEC: CPT | Performed by: NURSE PRACTITIONER

## 2020-12-28 PROCEDURE — 99213 OFFICE O/P EST LOW 20 MIN: CPT | Performed by: NURSE PRACTITIONER

## 2020-12-28 ASSESSMENT — MIFFLIN-ST. JEOR: SCORE: 1806.93

## 2020-12-28 NOTE — PROGRESS NOTES
Problem(s) Oriented visit        SUBJECTIVE:                                                    Paulina Diana is a 40 year old female who presents to clinic today for the following health issues :    CC: Cerebral sinus venous thrombosis    Anticoagulated with warfarin for CSVT. She has been taking warfarin 5mg once weekly, 7.5mg the remaining days. Goal INR 2-3, today's INR is 1.7. No missed doses. No changes in medication, but diet has been outside her normal due to the holidays. Did have a glass of wine over [employer]. No recent illnesses. No signs or symptoms of bleeding or clotting. Had a mild headache last night. 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 Didn't drink as much water as normal. Feeling fine today. No nausea/vomiting. Still having stable paresthesias in her fingers and toes that come and go. Just happens and goes away half an hour later. Pins and needles.  Seeing ophtho soon, can decrease the acetazolamide if the optic pressure is okay. Having IUD exchanged from Mirena to copper tomorrow- due to her genetic mutation, it is not advised for her to be on hormonal contraceptive.    Needs FMLA forms filled out today for short term disability- currently working, but cannot lift over 50lb and does have weekly INR checks until this is stable.      Problem list, Medication list, Allergies, and Medical/Social/Surgical histories reviewed in [facility] and updated as appropriate.   Additional history: as documented    ROS:  Gen, neuro, heme negative except as listed per HPI    Histories:   Patient Active Problem List   Diagnosis     Atypical nevus     Acquired hypothyroidism     Dermatofibroma of face     Melanocytic nevus, unspecified location     Morbid obesity (H)     Cerebral venous thrombosis of cortical vein     Long term current use of anticoagulant therapy     Anticoagulation monitoring, INR range 2-3     Past  "Surgical History:   Procedure Laterality Date     DILATION AND CURETTAGE SUCTION N/A 5/27/2016    Procedure: DILATION AND CURETTAGE SUCTION;  Surgeon: Natacha Goyal MD;  Location: [facility]     ENT SURGERY      septal repair     LAPAROSCOPIC EVACUATION ECTOPIC PREGNANCY Left 1/28/2018    Procedure: LAPAROSCOPIC EVACUATION ECTOPIC PREGNANCY;  LAPAROSCOPIC SINGLE SITE EVACUATION OF ECTOPIC PREGNANCY, LEFT SALPINGECTOMY;  Surgeon: Erum Boles MD;  Location:  OR     LAPAROSCOPIC SALPINGECTOMY Left 1/28/2018    Procedure: LAPAROSCOPIC SALPINGECTOMY;;  Surgeon: Erum Boles MD;  Location:  OR     LAPAROSCOPIC SALPINGO-OOPHORECTOMY  11/2/2013    Procedure: LAPAROSCOPIC SALPINGO-OOPHORECTOMY;  Single port laparoscopic, Evacuation of hemo-peritonium and Products of Conception;  Surgeon: Peggy Thornton MD;  Location:  OR     NOSE SURGERY      age 12       Social History     Tobacco Use     Smoking status: Never Smoker     Smokeless tobacco: Never Used     Tobacco comment:  on occasion   Substance Use Topics     Alcohol use: No     Alcohol/week: 0.0 standard drinks     Comment: rare     No family history on file.      Current Outpatient Medications   Medication Sig Dispense Refill     acetaZOLAMIDE (DIAMOX) 250 MG tablet Take 2 tablets (500 mg) by mouth 2 times daily 120 tablet 3     levothyroxine (SYNTHROID/LEVOTHROID) 112 MCG tablet Take 2 tablets (224 mcg) by mouth daily 120 tablet 2     warfarin ANTICOAGULANT (COUMADIN) 5 MG tablet Take 1-1.5 tablets (5-7.5 mg) by mouth daily 7.5mg six days per week, 5mg one day per week 100 tablet 3       OBJECTIVE:                                                    Physical Exam:    /72   Pulse 76   Temp 97.9  F (36.6  C)   Ht 1.638 m (5' 4.5\")   Wt 113.4 kg (250 lb)   LMP 06/16/2020 (Exact Date)   SpO2 99%   BMI 42.25 kg/m      CONSTITUTIONAL: Alert non-toxic appearing female in no acute distress  HEENT: PERRLA  RESPIRATORY: " Respirations unlabored, lungs clear throughout  CV: RRR, S1S2, no clicks, rubs, murmurs, or gallops. No peripheral edema. Post tib pulses 2+ bilaterally.  GI: Normoactive BS, soft, non-tender to palpation  MUSCULOSKELETAL: Moves all extremities, no obvious muscle wasting  NEUROLOGIC: grossly intact, normal gait  SKIN: Appropriate color for race, warm and dry, no suspicious lesions or rashes to unclothed skin  PSYCHIATRIC: Pleasant and interactive, affect euthymic, makes appropriate eye contact, thought process logical       ASSESSMENT/PLAN:                                                        Paulina was seen today for inr followup and consult.    Diagnoses and all orders for this visit:    Cerebral venous thrombosis of cortical vein  Long term current use of anticoagulant therapy  Anticoagulation monitoring, INR range 2-3  -     Prothrombin - INR ([facility])    INR 1.7. Increase warfarin to 7.5mg daily and recheck in one week. FMLA forms filled out.      Patient needs assistance with ADLs: none identified today  Patient needs assistance with iADLs: none identified today    The following health maintenance items are reviewed in Epic and correct as of today:  Health Maintenance   Topic Date Due     PREVENTIVE CARE VISIT  1982     EYE EXAM  1982     HEPATITIS C SCREENING  06/03/2000     PAP  04/01/2018     DTAP/TDAP/TD IMMUNIZATION (3 - Td) 09/04/2022     COLORECTAL CANCER SCREENING  10/20/2026     PHQ-2  Completed     INFLUENZA VACCINE  Addressed     Pneumococcal Vaccine: Pediatrics (0 to 5 Years) and At-Risk Patients (6 to 64 Years)  Aged Out     IPV IMMUNIZATION  Aged Out     MENINGITIS IMMUNIZATION  Aged Out     HEPATITIS B IMMUNIZATION  Aged Out     HIV SCREENING  Discontinued       Patient Instructions   7.5mg warfarin daily, recheck the INR in one week        ABDON Hodges CNP  [facility]  Family Practice  [facility]  [phone]    For any issues my office # is  [phone]

## 2020-12-28 NOTE — TELEPHONE ENCOUNTER
Patient left voice message stating that her OBGYN has some concerns that changing her IUD to copper vs. Mirena would results in heavy periods while on anticoagulation.  She would like Dr. Dickson's comments regarding the above.    A/C: warfarin    Hx: prothrombin and MTHFR coheterozygote -  admitted 8/10/20 to [facility] for presumed ectopic cornu pregnancy S/P IM MTX w/ resolution of ectopic w/o prior h/o VTE who then presented to [facility] 10/17/2020 with persistent headache x 3 days that woke up her from sleep and was found to have CVST.      Plan at last office visit 12/14/20:  d dimer and repeat MRV in October, 2021.

## 2020-12-29 NOTE — TELEPHONE ENCOUNTER
Relayed to patient via voice message (OK to [facility] on [place] 11/27/2020 )  Asked that she call with further questions.    Yamel Morin RN BSN  [facility]  [phone]

## 2020-12-29 NOTE — TELEPHONE ENCOUNTER
Please let her OB-GYN know I will return from vacation 1/4/21. Should she wish to discuss with me verbally, I would be happy to call her after that date. In brief however, my feelings about this are as follows. While I would like her off of Mirena for thrombosis reasons, if her OB-Gyn MD feels being on a copper IUD would result in greater net harm due to menorrhagia while on AC, I would understand and consent to continuation of the Mirena IUD.

## 2021-01-04 DIAGNOSIS — G08 CEREBRAL VENOUS THROMBOSIS OF CORTICAL VEIN: ICD-10-CM

## 2021-01-04 DIAGNOSIS — Z79.01 LONG TERM CURRENT USE OF ANTICOAGULANT THERAPY: ICD-10-CM

## 2021-01-04 DIAGNOSIS — Z79.01 ANTICOAGULATION MONITORING, INR RANGE 2-3: ICD-10-CM

## 2021-01-04 LAB — INR PPP: 1.7 (ref 2–3)

## 2021-01-04 PROCEDURE — 99212 OFFICE O/P EST SF 10 MIN: CPT | Performed by: NURSE PRACTITIONER

## 2021-01-04 PROCEDURE — 36416 COLLJ CAPILLARY BLOOD SPEC: CPT | Performed by: NURSE PRACTITIONER

## 2021-01-04 PROCEDURE — 85610 PROTHROMBIN TIME: CPT | Performed by: NURSE PRACTITIONER

## 2021-01-04 NOTE — PROGRESS NOTES
Problem(s) Oriented visit        SUBJECTIVE:                                                    Paulina Diana is a 40 year old female who presents to clinic today for the following health issues :    CC: Cerebral sinus venous thrombosis    Anticoagulated with warfarin for CSVT. She has been taking warfarin 7.5mg daily. Goal INR 2-3, today's INR is 1.7. No missed doses. No changes in medication, diet, or any new viral illnesses. No signs or symptoms of bleeding or clotting. No headaches or nausea/vomiting recently. Saw her OB and decision was made not to exchange Mirena for copper IUD due to risks of bleeding.      Problem list, Medication list, Allergies, and Medical/Social/Surgical histories reviewed in [facility] and updated as appropriate.   Additional history: as documented    ROS:  Gen, neuro, heme negative except as listed per HPI    Histories:   Patient Active Problem List   Diagnosis     Atypical nevus     Acquired hypothyroidism     Dermatofibroma of face     Melanocytic nevus, unspecified location     Morbid obesity (H)     Cerebral venous thrombosis of cortical vein     Long term current use of anticoagulant therapy     Anticoagulation monitoring, INR range 2-3     Past Surgical History:   Procedure Laterality Date     DILATION AND CURETTAGE SUCTION N/A 5/27/2016    Procedure: DILATION AND CURETTAGE SUCTION;  Surgeon: Natacha Goyal MD;  Location: [facility]     ENT SURGERY      septal repair     LAPAROSCOPIC EVACUATION ECTOPIC PREGNANCY Left 1/28/2018    Procedure: LAPAROSCOPIC EVACUATION ECTOPIC PREGNANCY;  LAPAROSCOPIC SINGLE SITE EVACUATION OF ECTOPIC PREGNANCY, LEFT SALPINGECTOMY;  Surgeon: Erum Boles MD;  Location:  OR     LAPAROSCOPIC SALPINGECTOMY Left 1/28/2018    Procedure: LAPAROSCOPIC SALPINGECTOMY;;  Surgeon: Erum Boles MD;  Location:  OR     LAPAROSCOPIC SALPINGO-OOPHORECTOMY  11/2/2013    Procedure: LAPAROSCOPIC SALPINGO-OOPHORECTOMY;  Single port laparoscopic,  Evacuation of hemo-peritonium and Products of Conception;  Surgeon: Peggy Thornton MD;  Location: [facility] OR     NOSE SURGERY      age 12       Social History     Tobacco Use     Smoking status: Never Smoker     Smokeless tobacco: Never Used     Tobacco comment:  on occasion   Substance Use Topics     Alcohol use: No     Alcohol/week: 0.0 standard drinks     Comment: rare     No family history on file.      Current Outpatient Medications   Medication Sig Dispense Refill     acetaZOLAMIDE (DIAMOX) 250 MG tablet Take 2 tablets (500 mg) by mouth 2 times daily 120 tablet 3     levothyroxine (SYNTHROID/LEVOTHROID) 112 MCG tablet Take 2 tablets (224 mcg) by mouth daily 120 tablet 2     warfarin ANTICOAGULANT (COUMADIN) 5 MG tablet Take 1-1.5 tablets (5-7.5 mg) by mouth daily 7.5mg six days per week, 5mg one day per week 100 tablet 3       OBJECTIVE:                                                    Physical Exam:    [facility] 06/16/2020 (Exact Date)     CONSTITUTIONAL: Alert non-toxic appearing female in no acute distress  NEUROLOGIC: grossly intact  SKIN: Appropriate color for race, warm and dry, no suspicious lesions or rashes to unclothed skin  PSYCHIATRIC: Pleasant and interactive, affect euthymic, makes appropriate eye contact, thought process logical       ASSESSMENT/PLAN:                                                      Diagnoses and all orders for this visit:    Cerebral venous thrombosis of cortical vein  Long term current use of anticoagulant therapy  Anticoagulation monitoring, INR range 2-3  -     Prothrombin - INR ([facility])    INR remains subtherapeutic. She is generally asymptomatic, but warfarin needs to be adjusted for an INR of 2-3. Will increase warfarin to 10mg on M/Th and 7.5mg the remaining five days of the week. Recheck in one week. To be seen earlier for concerns.        Patient needs assistance with ADLs: none identified today  Patient needs assistance with iADLs: none identified today    The  following health maintenance items are reviewed in Epic and correct as of today:  Health Maintenance   Topic Date Due     PREVENTIVE CARE VISIT  1982     EYE EXAM  1982     HEPATITIS C SCREENING  06/03/2000     PAP  04/01/2018     PHQ-2  01/01/2021     DTAP/TDAP/TD IMMUNIZATION (3 - Td) 09/04/2022     COLORECTAL CANCER SCREENING  10/20/2026     INFLUENZA VACCINE  Addressed     Pneumococcal Vaccine: Pediatrics (0 to 5 Years) and At-Risk Patients (6 to 64 Years)  Aged Out     IPV IMMUNIZATION  Aged Out     MENINGITIS IMMUNIZATION  Aged Out     HEPATITIS B IMMUNIZATION  Aged Out     HIV SCREENING  Discontinued       Patient Instructions   10mg M/Th, 7.5mg the rest of the days. Recheck in one week.      ABDON Hodges CNP  [facility]  Family Practice  [facility]  [phone]    For any issues my office # is [phone]

## 2021-01-11 DIAGNOSIS — Z79.01 ANTICOAGULATION MONITORING, INR RANGE 2-3: ICD-10-CM

## 2021-01-11 DIAGNOSIS — G08 CEREBRAL VENOUS THROMBOSIS OF CORTICAL VEIN: ICD-10-CM

## 2021-01-11 DIAGNOSIS — Z79.01 LONG TERM CURRENT USE OF ANTICOAGULANT THERAPY: ICD-10-CM

## 2021-01-11 LAB — INR PPP: 1.7 (ref 2–3)

## 2021-01-11 PROCEDURE — 85610 PROTHROMBIN TIME: CPT | Performed by: NURSE PRACTITIONER

## 2021-01-11 PROCEDURE — 99212 OFFICE O/P EST SF 10 MIN: CPT | Performed by: NURSE PRACTITIONER

## 2021-01-11 PROCEDURE — 36416 COLLJ CAPILLARY BLOOD SPEC: CPT | Performed by: NURSE PRACTITIONER

## 2021-01-11 RX ORDER — WARFARIN SODIUM 5 MG/1
7.5-1 TABLET ORAL DAILY
Qty: 150 TABLET | Refills: 3 | Status: SHIPPED | OUTPATIENT
Start: 2021-01-11 | End: 2021-10-04

## 2021-01-11 NOTE — PATIENT INSTRUCTIONS
10mg five days per week, 7.5mg twice per week. Recheck in one week. Call if you have any bleeding! If still subtherapeutic next week, we will get a peripheral lab draw to confirm.

## 2021-01-11 NOTE — PROGRESS NOTES
Problem(s) Oriented visit        SUBJECTIVE:                                                    Paulina Diana is a 40 year old female who presents to clinic today for the following health issues :    CC: Cerebral sinus venous thrombosis    Anticoagulated with warfarin for CSVT. She has been taking warfarin 10mg twice weekly and 7.5mg five times per week. Goal INR 2-3, today's INR is 1.7. No missed doses. No changes in medication, diet, or any new viral illnesses. No signs or symptoms of bleeding or clotting. No headaches or nausea/vomiting recently.       Problem list, Medication list, Allergies, and Medical/Social/Surgical histories reviewed in [facility] and updated as appropriate.   Additional history: as documented    ROS:  Gen, neuro, heme negative except as listed per HPI    Histories:   Patient Active Problem List   Diagnosis     Atypical nevus     Acquired hypothyroidism     Dermatofibroma of face     Melanocytic nevus, unspecified location     Morbid obesity (H)     Cerebral venous thrombosis of cortical vein     Long term current use of anticoagulant therapy     Anticoagulation monitoring, INR range 2-3     Past Surgical History:   Procedure Laterality Date     DILATION AND CURETTAGE SUCTION N/A 5/27/2016    Procedure: DILATION AND CURETTAGE SUCTION;  Surgeon: Natacha Goyal MD;  Location: [facility]     ENT SURGERY      septal repair     LAPAROSCOPIC EVACUATION ECTOPIC PREGNANCY Left 1/28/2018    Procedure: LAPAROSCOPIC EVACUATION ECTOPIC PREGNANCY;  LAPAROSCOPIC SINGLE SITE EVACUATION OF ECTOPIC PREGNANCY, LEFT SALPINGECTOMY;  Surgeon: Erum Boles MD;  Location:  OR     LAPAROSCOPIC SALPINGECTOMY Left 1/28/2018    Procedure: LAPAROSCOPIC SALPINGECTOMY;;  Surgeon: Erum Boles MD;  Location:  OR     LAPAROSCOPIC SALPINGO-OOPHORECTOMY  11/2/2013    Procedure: LAPAROSCOPIC SALPINGO-OOPHORECTOMY;  Single port laparoscopic, Evacuation of hemo-peritonium and Products of Conception;   Surgeon: Peggy Thornton MD;  Location: [facility] OR     NOSE SURGERY      age 12       Social History     Tobacco Use     Smoking status: Never Smoker     Smokeless tobacco: Never Used     Tobacco comment:  on occasion   Substance Use Topics     Alcohol use: No     Alcohol/week: 0.0 standard drinks     Comment: rare     No family history on file.      Current Outpatient Medications   Medication Sig Dispense Refill     acetaZOLAMIDE (DIAMOX) 250 MG tablet Take 2 tablets (500 mg) by mouth 2 times daily 120 tablet 3     levothyroxine (SYNTHROID/LEVOTHROID) 112 MCG tablet Take 2 tablets (224 mcg) by mouth daily 120 tablet 2     warfarin ANTICOAGULANT (COUMADIN) 5 MG tablet Take 1-1.5 tablets (5-7.5 mg) by mouth daily 7.5mg six days per week, 5mg one day per week 100 tablet 3       OBJECTIVE:                                                    Physical Exam:    [facility] 06/16/2020 (Exact Date)     CONSTITUTIONAL: Alert non-toxic appearing female in no acute distress  NEUROLOGIC: grossly intact  SKIN: Appropriate color for race, warm and dry, no suspicious lesions or rashes to unclothed skin  PSYCHIATRIC: Pleasant and interactive, affect euthymic, makes appropriate eye contact, thought process logical       ASSESSMENT/PLAN:                                                      Diagnoses and all orders for this visit:    Cerebral venous thrombosis of cortical vein  Long term current use of anticoagulant therapy  Anticoagulation monitoring, INR range 2-3  -     Prothrombin - INR ([facility])    INR remains subtherapeutic despite multiple dose increases. She is generally asymptomatic, but warfarin needs to be adjusted for an INR of 2-3. Will increase warfarin to 10mg five times per week and 7.5mg two days per week- reviewed that this is a slightly higher adjustment than is generally typical, but INR has not been improving despite multiple dose adjustments. She is keeping diet/meds/alcohol stable. We reviewed s/sxs bleeding in great  detail and when to be seen. Recheck in one week. To be seen earlier for concerns.        Patient needs assistance with ADLs: none identified today  Patient needs assistance with iADLs: none identified today    The following health maintenance items are reviewed in Epic and correct as of today:  Health Maintenance   Topic Date Due     PREVENTIVE CARE VISIT  1982     EYE EXAM  1980     HEPATITIS C SCREENING  06/03/2000     PAP  04/01/2018     PHQ-2  01/01/2021     DTAP/TDAP/TD IMMUNIZATION (3 - Td) 09/04/2022     COLORECTAL CANCER SCREENING  10/20/2026     INFLUENZA VACCINE  Addressed     Pneumococcal Vaccine: Pediatrics (0 to 5 Years) and At-Risk Patients (6 to 64 Years)  Aged Out     IPV IMMUNIZATION  Aged Out     MENINGITIS IMMUNIZATION  Aged Out     HEPATITIS B IMMUNIZATION  Aged Out     HIV SCREENING  Discontinued       Patient Instructions   10mg five days per week, 7.5mg twice per week. Recheck in one week. Call if you have any bleeding! If still subtherapeutic next week, we will get a peripheral lab draw to confirm.        ABDON Hodges CNP  [facility]  Family Practice  [facility]  [phone]    For any issues my office # is [phone]

## 2021-01-15 ENCOUNTER — HEALTH MAINTENANCE LETTER (OUTPATIENT)
Age: 41
End: 2021-01-15

## 2021-01-19 VITALS
HEIGHT: 65 IN | SYSTOLIC BLOOD PRESSURE: 112 MMHG | WEIGHT: 254.4 LBS | BODY MASS INDEX: 42.38 KG/M2 | OXYGEN SATURATION: 98 % | HEART RATE: 67 BPM | TEMPERATURE: 97.8 F | DIASTOLIC BLOOD PRESSURE: 84 MMHG

## 2021-01-19 DIAGNOSIS — G08 CEREBRAL VENOUS THROMBOSIS OF CORTICAL VEIN: Primary | ICD-10-CM

## 2021-01-19 DIAGNOSIS — Z79.01 LONG TERM CURRENT USE OF ANTICOAGULANT THERAPY: ICD-10-CM

## 2021-01-19 LAB — INR PPP: 2.2 (ref 2–3)

## 2021-01-19 PROCEDURE — 99213 OFFICE O/P EST LOW 20 MIN: CPT | Performed by: NURSE PRACTITIONER

## 2021-01-19 PROCEDURE — 85610 PROTHROMBIN TIME: CPT | Performed by: NURSE PRACTITIONER

## 2021-01-19 PROCEDURE — 36416 COLLJ CAPILLARY BLOOD SPEC: CPT | Performed by: NURSE PRACTITIONER

## 2021-01-19 ASSESSMENT — MIFFLIN-ST. JEOR: SCORE: 1830.86

## 2021-01-19 NOTE — PROGRESS NOTES
Problem(s) Oriented visit        SUBJECTIVE:                                                    Paulina Diana is a 40 year old female who presents to clinic today for the following health issues :    CC: Cerebral sinus venous thrombosis    Anticoagulated with warfarin for CSVT. She has been taking warfarin 10mg five times weekly and 7.5mg two times per week. Goal INR 2-3, today's INR is 2.2. No missed doses. No changes in medication, diet, or any new viral illnesses. No signs or symptoms of bleeding or clotting. No headaches or nausea/vomiting recently. Paresthesias stable, will be seeing ophtho in the next week to see about stopping Diamox.      Problem list, Medication list, Allergies, and Medical/Social/Surgical histories reviewed in [facility] and updated as appropriate.   Additional history: as documented    ROS:  Gen, neuro, heme negative except as listed per HPI    Histories:   Patient Active Problem List   Diagnosis     Atypical nevus     Acquired hypothyroidism     Dermatofibroma of face     Melanocytic nevus, unspecified location     Morbid obesity (H)     Cerebral venous thrombosis of cortical vein     Long term current use of anticoagulant therapy     Anticoagulation monitoring, INR range 2-3     Past Surgical History:   Procedure Laterality Date     DILATION AND CURETTAGE SUCTION N/A 5/27/2016    Procedure: DILATION AND CURETTAGE SUCTION;  Surgeon: Natacha Goyal MD;  Location: [facility]     ENT SURGERY      septal repair     LAPAROSCOPIC EVACUATION ECTOPIC PREGNANCY Left 1/28/2018    Procedure: LAPAROSCOPIC EVACUATION ECTOPIC PREGNANCY;  LAPAROSCOPIC SINGLE SITE EVACUATION OF ECTOPIC PREGNANCY, LEFT SALPINGECTOMY;  Surgeon: Erum Boles MD;  Location:  OR     LAPAROSCOPIC SALPINGECTOMY Left 1/28/2018    Procedure: LAPAROSCOPIC SALPINGECTOMY;;  Surgeon: Erum Boles MD;  Location:  OR     LAPAROSCOPIC SALPINGO-OOPHORECTOMY  11/2/2013    Procedure: LAPAROSCOPIC  "SALPINGO-OOPHORECTOMY;  Single port laparoscopic, Evacuation of hemo-peritonium and Products of Conception;  Surgeon: Peggy Thornton MD;  Location: [facility] OR     NOSE SURGERY      age 12       Social History     Tobacco Use     Smoking status: Never Smoker     Smokeless tobacco: Never Used     Tobacco comment:  on occasion   Substance Use Topics     Alcohol use: No     Alcohol/week: 0.0 standard drinks     Comment: rare     No family history on file.      Current Outpatient Medications   Medication Sig Dispense Refill     UNABLE TO FIND MEDICATION NAME: IUD       acetaZOLAMIDE (DIAMOX) 250 MG tablet Take 2 tablets (500 mg) by mouth 2 times daily 120 tablet 3     levothyroxine (SYNTHROID/LEVOTHROID) 112 MCG tablet Take 2 tablets (224 mcg) by mouth daily 120 tablet 2     warfarin ANTICOAGULANT (COUMADIN) 5 MG tablet Take 1.5-2 tablets (7.5-10 mg) by mouth daily 7.5mg twice per week, 10mg five times per week 150 tablet 3       OBJECTIVE:                                                    Physical Exam:    /84   Pulse 67   Temp 97.8  F (36.6  C) (Temporal)   Resp (!) 98   Ht 1.645 m (5' 4.75\")   Wt 115.4 kg (254 lb 6.4 oz)   LMP 01/13/2021 (Exact Date)   Breastfeeding No   BMI 42.66 kg/m      CONSTITUTIONAL: Alert non-toxic appearing female in no acute distress  NEUROLOGIC: grossly intact  SKIN: Appropriate color for race, warm and dry, no suspicious lesions or rashes to unclothed skin  PSYCHIATRIC: Pleasant and interactive, affect euthymic, makes appropriate eye contact, thought process logical       ASSESSMENT/PLAN:                                                      Diagnoses and all orders for this visit:    Cerebral venous thrombosis of cortical vein  Long term current use of anticoagulant therapy  Anticoagulation monitoring, INR range 2-3  -     Prothrombin - INR ([facility])    INR therapeutic, goal 2-3 with today's reading being 2.2. Continue warfarin 10mg five times per week and 7.5mg two days per " week. We reviewed s/sxs bleeding in great detail and when to be seen. Recheck in two weeks. To be seen earlier for concerns.    Medical Decision Makin stable chronic illness  Review of test results (INR)  Prescription drug management    Patient needs assistance with ADLs: none identified today  Patient needs assistance with iADLs: none identified today    The following health maintenance items are reviewed in Epic and correct as of today:  Health Maintenance   Topic Date Due     PREVENTIVE CARE VISIT  1982     EYE EXAM  1982     HEPATITIS C SCREENING  2000     PAP  2018     PHQ-2  2021     DTAP/TDAP/TD IMMUNIZATION (3 - Td) 2022     COLORECTAL CANCER SCREENING  10/20/2026     INFLUENZA VACCINE  Addressed     Pneumococcal Vaccine: Pediatrics (0 to 5 Years) and At-Risk Patients (6 to 64 Years)  Aged Out     IPV IMMUNIZATION  Aged Out     MENINGITIS IMMUNIZATION  Aged Out     HEPATITIS B IMMUNIZATION  Aged Out     HIV SCREENING  Discontinued       There are no Patient Instructions on file for this visit.    ABDON Hodges CNP  [facility]  Family Practice  [facility]  [phone]    For any issues my office # is [phone]

## 2021-01-19 NOTE — PATIENT INSTRUCTIONS
Two week recheck for INR! Stay the same!    Warfarin 10mg five times per week, 7.5mg twice per week

## 2021-01-21 ENCOUNTER — TRANSFERRED RECORDS (OUTPATIENT)
Dept: FAMILY MEDICINE | Facility: CLINIC | Age: 41
End: 2021-01-21

## 2021-01-25 ENCOUNTER — TRANSFERRED RECORDS (OUTPATIENT)
Dept: HEALTH INFORMATION MANAGEMENT | Facility: CLINIC | Age: 41
End: 2021-01-25

## 2021-01-25 ENCOUNTER — MYC MEDICAL ADVICE (OUTPATIENT)
Dept: NEUROLOGY | Facility: CLINIC | Age: 41
End: 2021-01-25

## 2021-02-02 NOTE — PLAN OF CARE
OT 4A. Cancel. Per harsha review, pt on bedrest today due to LP. Will reschedule per POC.    Orders entered. Pt notified.

## 2021-02-03 VITALS — TEMPERATURE: 98.1 F

## 2021-02-03 DIAGNOSIS — G08 CEREBRAL VENOUS THROMBOSIS OF CORTICAL VEIN: ICD-10-CM

## 2021-02-03 DIAGNOSIS — R58 ECCHYMOSIS: Primary | ICD-10-CM

## 2021-02-03 DIAGNOSIS — Z79.01 LONG TERM CURRENT USE OF ANTICOAGULANT THERAPY: ICD-10-CM

## 2021-02-03 LAB
% GRANULOCYTES: 74.6 % (ref 42.2–75.2)
HCT VFR BLD AUTO: 37.2 % (ref 35–46)
HEMOGLOBIN: 12 G/DL (ref 11.8–15.5)
INR PPP: 2.9 (ref 2–3)
LYMPHOCYTES NFR BLD AUTO: 18.5 % (ref 20.5–51.1)
MCH RBC QN AUTO: 28.2 PG (ref 27–31)
MCHC RBC AUTO-ENTMCNC: 32.3 G/DL (ref 33–37)
MCV RBC AUTO: 87.3 FL (ref 80–100)
MONOCYTES NFR BLD AUTO: 6.9 % (ref 1.7–9.3)
PLATELET # BLD AUTO: 382 K/UL (ref 140–450)
RBC # BLD AUTO: 4.26 X10/CMM (ref 3.7–5.2)
WBC # BLD AUTO: 6.5 X10/CMM (ref 3.8–11)

## 2021-02-03 PROCEDURE — 85025 COMPLETE CBC W/AUTO DIFF WBC: CPT | Performed by: NURSE PRACTITIONER

## 2021-02-03 PROCEDURE — 99213 OFFICE O/P EST LOW 20 MIN: CPT | Performed by: NURSE PRACTITIONER

## 2021-02-03 PROCEDURE — 85610 PROTHROMBIN TIME: CPT | Performed by: NURSE PRACTITIONER

## 2021-02-03 PROCEDURE — 36415 COLL VENOUS BLD VENIPUNCTURE: CPT | Performed by: NURSE PRACTITIONER

## 2021-02-04 LAB
APTT PPP: 34 SEC (ref 24–33)
INR PPP: 2.5 (ref 0.9–1.2)
PT BLD: 25.8 SEC (ref 9.1–12)

## 2021-02-04 NOTE — PROGRESS NOTES
Problem(s) Oriented visit        SUBJECTIVE:                                                    Paulina Diana is a 40 year old female who presents to clinic today for the following health issues :    CC: Cerebral sinus venous thrombosis    Anticoagulated with warfarin for CSVT. She has been taking warfarin 10mg five times weekly and 7.5mg two times per week. Goal INR 2-3, today's INR is 2.9. No missed doses. No changes in medication, diet, or any new viral illnesses. Having more issues with bruising, particularly over her left fourth and fifth knuckles- no significant pain or swelling. 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 No mucosal bleeding that she is aware of.  No headaches or nausea/vomiting recently. Paresthesias stable, saw ophthalmology and is awaiting direction from Dr. Earl to stop Diamox.      Problem list, Medication list, Allergies, and Medical/Social/Surgical histories reviewed in [facility] and updated as appropriate.   Additional history: as documented    ROS:  Gen, neuro, heme negative except as listed per HPI    Histories:   Patient Active Problem List   Diagnosis     Atypical nevus     Acquired hypothyroidism     Dermatofibroma of face     Melanocytic nevus, unspecified location     Morbid obesity (H)     Cerebral venous thrombosis of cortical vein     Long term current use of anticoagulant therapy     Anticoagulation monitoring, INR range 2-3     Past Surgical History:   Procedure Laterality Date     DILATION AND CURETTAGE SUCTION N/A 5/27/2016    Procedure: DILATION AND CURETTAGE SUCTION;  Surgeon: Natacha Goyal MD;  Location: [facility]     ENT SURGERY      septal repair     LAPAROSCOPIC EVACUATION ECTOPIC PREGNANCY Left 1/28/2018    Procedure: LAPAROSCOPIC EVACUATION ECTOPIC PREGNANCY;  LAPAROSCOPIC SINGLE SITE EVACUATION OF ECTOPIC PREGNANCY, LEFT SALPINGECTOMY;  Surgeon: Erum Boles MD;  Location:  OR     LAPAROSCOPIC SALPINGECTOMY Left 1/28/2018     Procedure: LAPAROSCOPIC SALPINGECTOMY;;  Surgeon: Erum Boles MD;  Location:  OR     LAPAROSCOPIC SALPINGO-OOPHORECTOMY  11/2/2013    Procedure: LAPAROSCOPIC SALPINGO-OOPHORECTOMY;  Single port laparoscopic, Evacuation of hemo-peritonium and Products of Conception;  Surgeon: Peggy Thornton MD;  Location:  OR     NOSE SURGERY      age 12       Social History     Tobacco Use     Smoking status: Never Smoker     Smokeless tobacco: Never Used     Tobacco comment:  on occasion   Substance Use Topics     Alcohol use: No     Alcohol/week: 0.0 standard drinks     Comment: rare     No family history on file.      Current Outpatient Medications   Medication Sig Dispense Refill     acetaZOLAMIDE (DIAMOX) 250 MG tablet Take 2 tablets (500 mg) by mouth 2 times daily 120 tablet 3     levothyroxine (SYNTHROID/LEVOTHROID) 112 MCG tablet Take 2 tablets (224 mcg) by mouth daily 120 tablet 2     UNABLE TO FIND MEDICATION NAME: IUD       warfarin ANTICOAGULANT (COUMADIN) 5 MG tablet Take 1.5-2 tablets (7.5-10 mg) by mouth daily 7.5mg twice per week, 10mg five times per week 150 tablet 3       OBJECTIVE:                                                    Physical Exam:    Temp 98.1  F (36.7  C)   LMP 01/13/2021 (Exact Date)     CONSTITUTIONAL: Alert non-toxic appearing female in no acute distress  NEUROLOGIC: grossly intact  SKIN: Appropriate color for race, warm and dry; faint ecchymosis without significant edema over left fourth and fifth PIPs without tenderness or swelling, ROM normal, trace bleeding to the right upper gums, no petechiae or purpura  PSYCHIATRIC: Pleasant and interactive, affect euthymic, makes appropriate eye contact, thought process logical       ASSESSMENT/PLAN:                                                      Paulina was seen today for inr followup.    Diagnoses and all orders for this visit:    Ecchymosis  Cerebral venous thrombosis of cortical vein  Long term current use of  anticoagulant therapy  -     Prothrombin - INR ([facility])  -     CBC with Diff/Plt ([facility])  -     PT and PTT ([facility])  -     VENOUS COLLECTION    INR on the higher end of desired range, now with bruising over the left fourth and fifth PIPs- concern for possible hemarthrosis, but she is not having discomfort or swelling, suspect more likely an injury from work. Concern that her capillary INR may not be aligning with peripheral INR given her history of requiring significant adjustments to warfarin dosing in the past- obtain CBC and serum coags. Decrease warfarin dosing to 7.5mg three times per week and 10mg four times per week. Recheck in 7-10 days, earlier with concerns or if her coags return abnormal. Reviewed s/sxs bleeding and when to seek care.        Patient needs assistance with ADLs: none identified today  Patient needs assistance with iADLs: none identified today    The following health maintenance items are reviewed in Epic and correct as of today:  Health Maintenance   Topic Date Due     PREVENTIVE CARE VISIT  1982     EYE EXAM  1982     MAMMO SCREENING  1982     HEPATITIS C SCREENING  06/03/2000     PAP  04/01/2018     PHQ-2  01/01/2021     DTAP/TDAP/TD IMMUNIZATION (3 - Td) 09/04/2022     COLORECTAL CANCER SCREENING  10/20/2026     INFLUENZA VACCINE  Addressed     Pneumococcal Vaccine: Pediatrics (0 to 5 Years) and At-Risk Patients (6 to 64 Years)  Aged Out     IPV IMMUNIZATION  Aged Out     MENINGITIS IMMUNIZATION  Aged Out     HEPATITIS B IMMUNIZATION  Aged Out     HIV SCREENING  Discontinued       Patient Instructions   Labs today- recheck in two Mondays (about 10 days)  7.5mg Tues/Thurs/Sat, 10mg all others        ABDON Hodges CNP  [facility]  Family Practice  [facility]  [phone]    For any issues my office # is [phone]

## 2021-02-15 DIAGNOSIS — E03.9 HYPOTHYROIDISM: ICD-10-CM

## 2021-02-15 DIAGNOSIS — Z76.0 ENCOUNTER FOR MEDICATION REFILL: Primary | ICD-10-CM

## 2021-02-15 RX ORDER — LEVOTHYROXINE SODIUM 112 UG/1
224 TABLET ORAL DAILY
Qty: 120 TABLET | Refills: 0 | Status: SHIPPED | OUTPATIENT
Start: 2021-02-15 | End: 2021-04-19

## 2021-02-15 NOTE — TELEPHONE ENCOUNTER
Levothyroxine.  LOV 12/28/20. Needs CPX       Ref Range & Units 3mo ago 4mo ago 3yr ago 12yr ago      TSH 0.40 - 4.00 mU/L 0.49  0.30Low   17.79High   6.517 R

## 2021-02-16 DIAGNOSIS — G08 CEREBRAL VENOUS THROMBOSIS OF CORTICAL VEIN: ICD-10-CM

## 2021-02-16 DIAGNOSIS — Z79.01 LONG TERM CURRENT USE OF ANTICOAGULANT THERAPY: ICD-10-CM

## 2021-02-16 LAB — INR PPP: 2.7 (ref 2–3)

## 2021-02-16 PROCEDURE — 85610 PROTHROMBIN TIME: CPT | Performed by: NURSE PRACTITIONER

## 2021-02-16 PROCEDURE — 36416 COLLJ CAPILLARY BLOOD SPEC: CPT | Performed by: NURSE PRACTITIONER

## 2021-02-24 ENCOUNTER — MYC MEDICAL ADVICE (OUTPATIENT)
Dept: NEUROLOGY | Facility: CLINIC | Age: 41
End: 2021-02-24

## 2021-03-01 VITALS — TEMPERATURE: 97.8 F

## 2021-03-01 DIAGNOSIS — G08 CEREBRAL VENOUS THROMBOSIS OF CORTICAL VEIN: ICD-10-CM

## 2021-03-01 DIAGNOSIS — Z79.01 LONG TERM CURRENT USE OF ANTICOAGULANT THERAPY: ICD-10-CM

## 2021-03-01 LAB — INR PPP: 1.8 (ref 2–3)

## 2021-03-01 PROCEDURE — 36416 COLLJ CAPILLARY BLOOD SPEC: CPT | Performed by: NURSE PRACTITIONER

## 2021-03-01 PROCEDURE — 85610 PROTHROMBIN TIME: CPT | Performed by: NURSE PRACTITIONER

## 2021-03-08 VITALS — TEMPERATURE: 97.9 F

## 2021-03-08 DIAGNOSIS — G08 CEREBRAL VENOUS THROMBOSIS OF CORTICAL VEIN: ICD-10-CM

## 2021-03-08 DIAGNOSIS — Z79.01 LONG TERM CURRENT USE OF ANTICOAGULANT THERAPY: ICD-10-CM

## 2021-03-08 LAB — INR PPP: 1.6 (ref 2–3)

## 2021-03-08 PROCEDURE — 99213 OFFICE O/P EST LOW 20 MIN: CPT | Performed by: NURSE PRACTITIONER

## 2021-03-08 PROCEDURE — 36416 COLLJ CAPILLARY BLOOD SPEC: CPT | Performed by: NURSE PRACTITIONER

## 2021-03-08 PROCEDURE — 85610 PROTHROMBIN TIME: CPT | Performed by: NURSE PRACTITIONER

## 2021-03-08 NOTE — PROGRESS NOTES
Problem(s) Oriented visit        SUBJECTIVE:                                                    Paulina Diana is a 40 year old female who presents to clinic today for the following health issues :    CC: Cerebral sinus venous thrombosis    Anticoagulated with warfarin for CSVT. She has been taking warfarin 10mg five times weekly and 7.5mg two times per week. Goal INR 2-3, today's INR is 1.6. No missed doses. No changes in diet, alcohol, or any new viral illnesses. Titrating off Diamox- still having some pins and needles, lemon taste is going away. Bruising has resolved, no mucosal bleeding. No headaches or nausea/vomiting recently.       Problem list, Medication list, Allergies, and Medical/Social/Surgical histories reviewed in [facility] and updated as appropriate.   Additional history: as documented    ROS:  Gen, neuro, heme negative except as listed per HPI    Histories:   Patient Active Problem List   Diagnosis     Atypical nevus     Acquired hypothyroidism     Dermatofibroma of face     Melanocytic nevus, unspecified location     Morbid obesity (H)     Cerebral venous thrombosis of cortical vein     Long term current use of anticoagulant therapy     Anticoagulation monitoring, INR range 2-3     Past Surgical History:   Procedure Laterality Date     DILATION AND CURETTAGE SUCTION N/A 5/27/2016    Procedure: DILATION AND CURETTAGE SUCTION;  Surgeon: Natacha Goyal MD;  Location: [facility]     ENT SURGERY      septal repair     LAPAROSCOPIC EVACUATION ECTOPIC PREGNANCY Left 1/28/2018    Procedure: LAPAROSCOPIC EVACUATION ECTOPIC PREGNANCY;  LAPAROSCOPIC SINGLE SITE EVACUATION OF ECTOPIC PREGNANCY, LEFT SALPINGECTOMY;  Surgeon: Erum Boles MD;  Location:  OR     LAPAROSCOPIC SALPINGECTOMY Left 1/28/2018    Procedure: LAPAROSCOPIC SALPINGECTOMY;;  Surgeon: Erum Boles MD;  Location:  OR     LAPAROSCOPIC SALPINGO-OOPHORECTOMY  11/2/2013    Procedure: LAPAROSCOPIC SALPINGO-OOPHORECTOMY;  Single  port laparoscopic, Evacuation of hemo-peritonium and Products of Conception;  Surgeon: Peggy Thornton MD;  Location: [facility] OR     NOSE SURGERY      age 12       Social History     Tobacco Use     Smoking status: Never Smoker     Smokeless tobacco: Never Used     Tobacco comment:  on occasion   Substance Use Topics     Alcohol use: No     Alcohol/week: 0.0 standard drinks     Comment: rare     No family history on file.      Current Outpatient Medications   Medication Sig Dispense Refill     acetaZOLAMIDE (DIAMOX) 250 MG tablet Take 2 tablets (500 mg) by mouth 2 times daily 120 tablet 3     levothyroxine (SYNTHROID/LEVOTHROID) 112 MCG tablet Take 2 tablets (224 mcg) by mouth daily 120 tablet 0     UNABLE TO FIND MEDICATION NAME: IUD       warfarin ANTICOAGULANT (COUMADIN) 5 MG tablet Take 1.5-2 tablets (7.5-10 mg) by mouth daily 7.5mg twice per week, 10mg five times per week 150 tablet 3       OBJECTIVE:                                                    Physical Exam:    Temp 97.9  F (36.6  C)   LMP 06/16/2020 (Exact Date)     CONSTITUTIONAL: Alert non-toxic appearing female in no acute distress  NEUROLOGIC: grossly intact  SKIN: Appropriate color for race, warm and dry without visible ecchymoses  PSYCHIATRIC: Pleasant and interactive, affect euthymic, makes appropriate eye contact, thought process logical       ASSESSMENT/PLAN:                                                      Paulina was seen today for inr followup.    Diagnoses and all orders for this visit:    Cerebral venous thrombosis of cortical vein  Long term current use of anticoagulant therapy  -     Prothrombin - INR ([facility])    Subtherapeutic- increase dose of warfarin to 10mg daily, recheck in 7-14 days. Reviewed s/sxs bleeding/clotting and when to seek further care.        Patient needs assistance with ADLs: none identified today  Patient needs assistance with iADLs: none identified today    The following health maintenance items are reviewed in  Epic and correct as of today:  Health Maintenance   Topic Date Due     PREVENTIVE CARE VISIT  Never done     ADVANCE CARE PLANNING  Never done     EYE EXAM  Never done     HEPATITIS C SCREENING  Never done     PHQ-2  01/01/2021     PAP  02/23/2021     DTAP/TDAP/TD IMMUNIZATION (3 - Td) 09/04/2022     COLORECTAL CANCER SCREENING  10/20/2026     INFLUENZA VACCINE  Addressed     Pneumococcal Vaccine: Pediatrics (0 to 5 Years) and At-Risk Patients (6 to 64 Years)  Aged Out     IPV IMMUNIZATION  Aged Out     MENINGITIS IMMUNIZATION  Aged Out     HEPATITIS B IMMUNIZATION  Aged Out     HIV SCREENING  Discontinued       Patient Instructions   Warfarin 10mg daily. Recheck in 7-14 days.      ABDON Hodges CNP  [facility]  Family Practice  [facility]  [phone]    For any issues my office # is [phone]

## 2021-03-22 DIAGNOSIS — Z53.9 ERRONEOUS ENCOUNTER--DISREGARD: Primary | ICD-10-CM

## 2021-03-22 DIAGNOSIS — Z79.01 LONG TERM CURRENT USE OF ANTICOAGULANT THERAPY: ICD-10-CM

## 2021-03-22 DIAGNOSIS — G08 CEREBRAL VENOUS THROMBOSIS OF CORTICAL VEIN: ICD-10-CM

## 2021-03-22 LAB — INR PPP: 3.1 (ref 2–3)

## 2021-03-22 NOTE — PROGRESS NOTES
This encounter was opened in error. Please disregard. Pt called with instructions- to take 7.5mg x1 day, 10mg x6 days per week and recheck in two weeks.

## 2021-03-31 ENCOUNTER — OFFICE VISIT (OUTPATIENT)
Dept: FAMILY MEDICINE | Facility: CLINIC | Age: 41
End: 2021-03-31

## 2021-03-31 VITALS
DIASTOLIC BLOOD PRESSURE: 68 MMHG | HEART RATE: 75 BPM | OXYGEN SATURATION: 99 % | BODY MASS INDEX: 43 KG/M2 | RESPIRATION RATE: 16 BRPM | TEMPERATURE: 97.8 F | WEIGHT: 256.4 LBS | SYSTOLIC BLOOD PRESSURE: 114 MMHG

## 2021-03-31 DIAGNOSIS — G08 CEREBRAL VENOUS THROMBOSIS OF CORTICAL VEIN: ICD-10-CM

## 2021-03-31 DIAGNOSIS — Z79.01 LONG TERM CURRENT USE OF ANTICOAGULANT THERAPY: Primary | ICD-10-CM

## 2021-03-31 DIAGNOSIS — T80.90XA INJECTION SITE REACTION, INITIAL ENCOUNTER: ICD-10-CM

## 2021-03-31 LAB — INR PPP: 2.9 (ref 2–3)

## 2021-03-31 PROCEDURE — 99213 OFFICE O/P EST LOW 20 MIN: CPT | Performed by: NURSE PRACTITIONER

## 2021-03-31 PROCEDURE — 85610 PROTHROMBIN TIME: CPT | Performed by: NURSE PRACTITIONER

## 2021-03-31 PROCEDURE — 36416 COLLJ CAPILLARY BLOOD SPEC: CPT | Performed by: NURSE PRACTITIONER

## 2021-03-31 NOTE — PATIENT INSTRUCTIONS
Keep taking warfarin 10mg six days per week, 7.5 on Thursdays  Recheck in two weeks- if good at that time, then monthly!    Watch the arm- ice, you can try Benadryl too. Let Mikaela know if the arm becomes super swollen, increasingly red and tender, or if you get a fever.

## 2021-04-02 NOTE — PROGRESS NOTES
Problem(s) Oriented visit        SUBJECTIVE:                                                    Paulina Diana is a 40 year old female who presents to clinic today for the following health issues :    CC: Cerebral sinus venous thrombosis    Anticoagulated with warfarin for CSVT. She has been taking warfarin 10mg six times weekly and 7.5mg once per week. Goal INR 2-3, today's INR is 2.9. No missed doses. No changes in diet, alcohol, or any new viral illnesses. Now off Diamox. Bruising has resolved, no mucosal bleeding. No headaches or nausea/vomiting recently. Received second COVID19 vaccine- Moderna on 3/27 and developed redness afterward. Slightly warm and tender, but not generally worsening and she has been afebrile. No bruising noted.      Problem list, Medication list, Allergies, and Medical/Social/Surgical histories reviewed in [facility] and updated as appropriate.   Additional history: as documented    ROS:  Gen, neuro, heme, skin, MSK negative except as listed per HPI    Histories:   Patient Active Problem List   Diagnosis     Atypical nevus     Acquired hypothyroidism     Dermatofibroma of face     Melanocytic nevus, unspecified location     Morbid obesity (H)     Cerebral venous thrombosis of cortical vein     Long term current use of anticoagulant therapy     Anticoagulation monitoring, INR range 2-3     Past Surgical History:   Procedure Laterality Date     DILATION AND CURETTAGE SUCTION N/A 5/27/2016    Procedure: DILATION AND CURETTAGE SUCTION;  Surgeon: Natacha Goyal MD;  Location: [facility]     ENT SURGERY      septal repair     LAPAROSCOPIC EVACUATION ECTOPIC PREGNANCY Left 1/28/2018    Procedure: LAPAROSCOPIC EVACUATION ECTOPIC PREGNANCY;  LAPAROSCOPIC SINGLE SITE EVACUATION OF ECTOPIC PREGNANCY, LEFT SALPINGECTOMY;  Surgeon: Erum Boles MD;  Location:  OR     LAPAROSCOPIC SALPINGECTOMY Left 1/28/2018    Procedure: LAPAROSCOPIC SALPINGECTOMY;;  Surgeon: Erum Boles MD;   Location: [facility] OR     LAPAROSCOPIC SALPINGO-OOPHORECTOMY  11/2/2013    Procedure: LAPAROSCOPIC SALPINGO-OOPHORECTOMY;  Single port laparoscopic, Evacuation of hemo-peritonium and Products of Conception;  Surgeon: Peggy Thornton MD;  Location: [facility] OR     NOSE SURGERY      age 12       Social History     Tobacco Use     Smoking status: Never Smoker     Smokeless tobacco: Never Used     Tobacco comment:  on occasion   Substance Use Topics     Alcohol use: No     Alcohol/week: 0.0 standard drinks     Comment: rare     No family history on file.      Current Outpatient Medications   Medication Sig Dispense Refill     acetaZOLAMIDE (DIAMOX) 250 MG tablet Take 2 tablets (500 mg) by mouth 2 times daily 120 tablet 3     levothyroxine (SYNTHROID/LEVOTHROID) 112 MCG tablet Take 2 tablets (224 mcg) by mouth daily 120 tablet 0     UNABLE TO FIND MEDICATION NAME: IUD       warfarin ANTICOAGULANT (COUMADIN) 5 MG tablet Take 1.5-2 tablets (7.5-10 mg) by mouth daily 7.5mg twice per week, 10mg five times per week 150 tablet 3       OBJECTIVE:                                                    Physical Exam:    /68   Pulse 75   Temp 97.8  F (36.6  C) (Temporal)   Resp 16   Wt 116.3 kg (256 lb 6.4 oz)   SpO2 99%   BMI 43.00 kg/m      CONSTITUTIONAL: Alert non-toxic appearing female in no acute distress  NEUROLOGIC: grossly intact  SKIN: Appropriate color for race, warm and dry without visible ecchymoses; L deltoid with large erythematous patch measuring roughly 8-10cm, warm, but no significant edema, tenderness, drainage, or induration. No apparent ecchymoses. Small 1cm firm lump at injection site.  PSYCHIATRIC: Pleasant and interactive, affect euthymic, makes appropriate eye contact, thought process logical       ASSESSMENT/PLAN:                                                      Paulina was seen today for musculoskeletal problem.    Diagnoses and all orders for this visit:    Long term current use of anticoagulant  therapy  Cerebral venous thrombosis of cortical vein  -     Prothrombin - INR ([facility])    Therapeutic, continue warfarin 10mg 6 days per week, 7.5mg one day per week. Recheck in two weeks.    Injection site reaction, initial encounter    Suspect injection site reaction rather than cellulitis or hematoma. Discussed ice, can trial diphenhydramine. Reviewed s/sxs infection, hematoma, worsening reaction and when to seek further care.          Patient needs assistance with ADLs: none identified today  Patient needs assistance with iADLs: none identified today    The following health maintenance items are reviewed in Epic and correct as of today:  Health Maintenance   Topic Date Due     PREVENTIVE CARE VISIT  Never done     ADVANCE CARE PLANNING  Never done     EYE EXAM  Never done     HEPATITIS C SCREENING  Never done     PHQ-2  01/01/2021     PAP  02/23/2021     COVID-19 Vaccine (2 - Moderna 2-dose series) 04/24/2021     DTAP/TDAP/TD IMMUNIZATION (3 - Td) 09/04/2022     COLORECTAL CANCER SCREENING  10/20/2026     INFLUENZA VACCINE  Addressed     Pneumococcal Vaccine: Pediatrics (0 to 5 Years) and At-Risk Patients (6 to 64 Years)  Aged Out     IPV IMMUNIZATION  Aged Out     MENINGITIS IMMUNIZATION  Aged Out     HEPATITIS B IMMUNIZATION  Aged Out     HIV SCREENING  Discontinued       Patient Instructions   Keep taking warfarin 10mg six days per week, 7.5 on Thursdays  Recheck in two weeks- if good at that time, then monthly!    Watch the arm- ice, you can try Benadryl too. Let Mikaela know if the arm becomes super swollen, increasingly red and tender, or if you get a fever.        ABDON Hodges CNP  [facility]  Family Practice  [facility]  [phone]    For any issues my office # is [phone]

## 2021-04-13 ENCOUNTER — TELEPHONE (OUTPATIENT)
Dept: FAMILY MEDICINE | Facility: CLINIC | Age: 41
End: 2021-04-13

## 2021-04-13 NOTE — TELEPHONE ENCOUNTER
Patient came in for an INR yesterday and due to it being very busy she waiting longer than she was able to wait. I contacted her today to apologize for the wait and explained Mondays are very busy days. She will schedule another day for her INR's. She would like to wait until next week to check her INR. I will check with Mikaela to be sure this is ok.

## 2021-04-18 DIAGNOSIS — Z76.0 ENCOUNTER FOR MEDICATION REFILL: ICD-10-CM

## 2021-04-19 RX ORDER — LEVOTHYROXINE SODIUM 112 UG/1
TABLET ORAL
Qty: 120 TABLET | Refills: 0 | Status: SHIPPED | OUTPATIENT
Start: 2021-04-19 | End: 2021-06-21

## 2021-04-20 ENCOUNTER — OFFICE VISIT (OUTPATIENT)
Dept: FAMILY MEDICINE | Facility: CLINIC | Age: 41
End: 2021-04-20

## 2021-04-20 VITALS
BODY MASS INDEX: 42.73 KG/M2 | SYSTOLIC BLOOD PRESSURE: 116 MMHG | DIASTOLIC BLOOD PRESSURE: 74 MMHG | WEIGHT: 254.8 LBS | TEMPERATURE: 98.1 F

## 2021-04-20 DIAGNOSIS — E03.9 HYPOTHYROIDISM: Primary | ICD-10-CM

## 2021-04-20 DIAGNOSIS — G08 CEREBRAL VENOUS THROMBOSIS OF CORTICAL VEIN: ICD-10-CM

## 2021-04-20 DIAGNOSIS — Z79.01 LONG TERM CURRENT USE OF ANTICOAGULANT THERAPY: ICD-10-CM

## 2021-04-20 LAB — INR PPP: 2.7 (ref 2–3)

## 2021-04-20 PROCEDURE — 85610 PROTHROMBIN TIME: CPT | Performed by: NURSE PRACTITIONER

## 2021-04-20 PROCEDURE — 36416 COLLJ CAPILLARY BLOOD SPEC: CPT | Performed by: NURSE PRACTITIONER

## 2021-04-20 PROCEDURE — 99207 PR NO CHARGE NURSE ONLY: CPT | Performed by: NURSE PRACTITIONER

## 2021-05-12 ENCOUNTER — TELEPHONE (OUTPATIENT)
Dept: OTHER | Facility: CLINIC | Age: 41
End: 2021-05-12

## 2021-05-12 DIAGNOSIS — G08 CEREBRAL VENOUS THROMBOSIS OF CORTICAL VEIN: Primary | ICD-10-CM

## 2021-05-12 NOTE — TELEPHONE ENCOUNTER
Patient stated that at LOV Dr. Dickson mentioned he would like to have an MRI and MRV completed around 10/2021. Patient would like orders to be put in system so she can schedule for them ahead of time.     Informed will send message to nurse and call back when instructions are given.       Radha JUDD

## 2021-05-13 NOTE — TELEPHONE ENCOUNTER
"LOV with Dr. Dickson on 12/14/20.  \"I would favor at least 12 months AC with warfarin. I would not switch her to Xarelto. I would check a d dimer and repeat MRV in October, 2021, and see her back one week later. I would favor longer term AC in this individual beyond one year if she has any other intervening clinical thrombotic difficulties\"    Order for d-dimer, MRV and follow up placed in Epic for October 2021.      Marcia BOWSER, RN    [facility]  Vascular [facility]  Office: [phone]  Fax: [phone]        "

## 2021-05-13 NOTE — TELEPHONE ENCOUNTER
Called patient and relay that order is on chart. Gave patient [phone] for scheduling.     Radha JUDD

## 2021-06-01 DIAGNOSIS — Z79.01 LONG TERM CURRENT USE OF ANTICOAGULANT THERAPY: ICD-10-CM

## 2021-06-01 DIAGNOSIS — G08 CEREBRAL VENOUS THROMBOSIS OF CORTICAL VEIN: ICD-10-CM

## 2021-06-01 LAB — INR PPP: 3.6 (ref 2–3)

## 2021-06-01 PROCEDURE — 85610 PROTHROMBIN TIME: CPT | Performed by: NURSE PRACTITIONER

## 2021-06-01 PROCEDURE — 36416 COLLJ CAPILLARY BLOOD SPEC: CPT | Performed by: NURSE PRACTITIONER

## 2021-06-15 DIAGNOSIS — N94.6 DYSMENORRHEA: Primary | ICD-10-CM

## 2021-06-15 DIAGNOSIS — Z79.01 LONG TERM CURRENT USE OF ANTICOAGULANT THERAPY: ICD-10-CM

## 2021-06-15 DIAGNOSIS — G08 CEREBRAL VENOUS THROMBOSIS OF CORTICAL VEIN: ICD-10-CM

## 2021-06-15 LAB — INR PPP: 2.6 (ref 2–3)

## 2021-06-15 PROCEDURE — 85610 PROTHROMBIN TIME: CPT | Performed by: NURSE PRACTITIONER

## 2021-06-15 PROCEDURE — 36416 COLLJ CAPILLARY BLOOD SPEC: CPT | Performed by: NURSE PRACTITIONER

## 2021-06-15 RX ORDER — CYCLOBENZAPRINE HCL 5 MG
5 TABLET ORAL 3 TIMES DAILY PRN
Qty: 20 TABLET | Refills: 1 | Status: SHIPPED | OUTPATIENT
Start: 2021-06-15 | End: 2021-07-23

## 2021-06-20 DIAGNOSIS — Z76.0 ENCOUNTER FOR MEDICATION REFILL: ICD-10-CM

## 2021-06-21 RX ORDER — LEVOTHYROXINE SODIUM 112 UG/1
TABLET ORAL
Qty: 120 TABLET | Refills: 0 | Status: SHIPPED | OUTPATIENT
Start: 2021-06-21 | End: 2021-08-30

## 2021-06-29 VITALS — SYSTOLIC BLOOD PRESSURE: 120 MMHG | DIASTOLIC BLOOD PRESSURE: 73 MMHG | HEART RATE: 72 BPM

## 2021-06-29 DIAGNOSIS — R79.1 SUPRATHERAPEUTIC INR: ICD-10-CM

## 2021-06-29 DIAGNOSIS — Z79.01 LONG TERM CURRENT USE OF ANTICOAGULANT THERAPY: ICD-10-CM

## 2021-06-29 DIAGNOSIS — G08 CEREBRAL VENOUS THROMBOSIS OF CORTICAL VEIN: Primary | ICD-10-CM

## 2021-06-29 LAB — INR PPP: 4.2 (ref 2–3)

## 2021-06-29 PROCEDURE — 85610 PROTHROMBIN TIME: CPT | Performed by: NURSE PRACTITIONER

## 2021-06-29 PROCEDURE — 36416 COLLJ CAPILLARY BLOOD SPEC: CPT | Performed by: NURSE PRACTITIONER

## 2021-06-29 PROCEDURE — 99213 OFFICE O/P EST LOW 20 MIN: CPT | Mod: 25 | Performed by: NURSE PRACTITIONER

## 2021-06-29 NOTE — PATIENT INSTRUCTIONS
Hold two days then decrease to 7.5mg four days per week and 10mg three days per week. Recheck in one week.

## 2021-06-29 NOTE — PROGRESS NOTES
Problem(s) Oriented visit        SUBJECTIVE:                                                    Paulina Diana is a 41 year old female who presents to clinic today for the following health issues :    CC: Cerebral sinus venous thrombosis    Anticoagulated with warfarin for CSVT. She has been taking warfarin 10mg six times weekly and 7.5mg once per week. Goal INR 2-3, today's INR is 4.2. No missed doses. No changes in diet, alcohol, or any new viral illnesses. She cannot find a reason why her INR would be so abnormal. 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 No headaches or nausea/vomiting recently.     Problem list, Medication list, Allergies, and Medical/Social/Surgical histories reviewed in [facility] and updated as appropriate.   Additional history: as documented    ROS:  Gen, neuro, heme, skin, MSK negative except as listed per HPI    Histories:   Patient Active Problem List   Diagnosis     Atypical nevus     Acquired hypothyroidism     Dermatofibroma of face     Melanocytic nevus, unspecified location     Morbid obesity (H)     Cerebral venous thrombosis of cortical vein     Long term current use of anticoagulant therapy     Anticoagulation monitoring, INR range 2-3     Past Surgical History:   Procedure Laterality Date     DILATION AND CURETTAGE SUCTION N/A 5/27/2016    Procedure: DILATION AND CURETTAGE SUCTION;  Surgeon: Natacha Goyal MD;  Location: [facility]     ENT SURGERY      septal repair     LAPAROSCOPIC EVACUATION ECTOPIC PREGNANCY Left 1/28/2018    Procedure: LAPAROSCOPIC EVACUATION ECTOPIC PREGNANCY;  LAPAROSCOPIC SINGLE SITE EVACUATION OF ECTOPIC PREGNANCY, LEFT SALPINGECTOMY;  Surgeon: Erum Boles MD;  Location:  OR     LAPAROSCOPIC SALPINGECTOMY Left 1/28/2018    Procedure: LAPAROSCOPIC SALPINGECTOMY;;  Surgeon: Erum Boles MD;  Location:  OR     LAPAROSCOPIC SALPINGO-OOPHORECTOMY  11/2/2013    Procedure: LAPAROSCOPIC  SALPINGO-OOPHORECTOMY;  Single port laparoscopic, Evacuation of hemo-peritonium and Products of Conception;  Surgeon: Peggy Thornton MD;  Location: [facility] OR     NOSE SURGERY      age 12       Social History     Tobacco Use     Smoking status: Never Smoker     Smokeless tobacco: Never Used     Tobacco comment:  on occasion   Substance Use Topics     Alcohol use: No     Alcohol/week: 0.0 standard drinks     Comment: rare     No family history on file.      Current Outpatient Medications   Medication Sig Dispense Refill     acetaZOLAMIDE (DIAMOX) 250 MG tablet Take 2 tablets (500 mg) by mouth 2 times daily 120 tablet 3     cyclobenzaprine (FLEXERIL) 5 MG tablet Take 1 tablet (5 mg) by mouth 3 times daily as needed for muscle spasms 20 tablet 1     levothyroxine (SYNTHROID/LEVOTHROID) 112 MCG tablet TAKE 2 TABLETS(224 MCG) BY MOUTH DAILY 120 tablet 0     UNABLE TO FIND MEDICATION NAME: IUD       warfarin ANTICOAGULANT (COUMADIN) 5 MG tablet Take 1.5-2 tablets (7.5-10 mg) by mouth daily 7.5mg twice per week, 10mg five times per week 150 tablet 3       OBJECTIVE:                                                    Physical Exam:    /73   Pulse 72     CONSTITUTIONAL: Alert non-toxic appearing female in no acute distress  NEUROLOGIC: grossly intact  SKIN: No obvious bruising  PSYCHIATRIC: Pleasant and interactive, affect euthymic, makes appropriate eye contact, thought process logical       ASSESSMENT/PLAN:                                                      Paulina was seen today for inr followup.    Diagnoses and all orders for this visit:    Cerebral venous thrombosis of cortical vein  Long term current use of anticoagulant therapy  Supratherapeutic INR  -     Prothrombin - INR ([facility])    Supratherapeutic without evidence of significant bleeding- hold warfarin x2 days then decrease dose to 7.5mg four times weekly and 10mg three times weekly. Recheck in one week. Reviewed bleeding precautions and when to seek  further care.          Patient needs assistance with ADLs: none identified today  Patient needs assistance with iADLs: none identified today    The following health maintenance items are reviewed in Epic and correct as of today:  Health Maintenance   Topic Date Due     PREVENTIVE CARE VISIT  Never done     ADVANCE CARE PLANNING  Never done     EYE EXAM  Never done     HEPATITIS C SCREENING  Never done     PHQ-2  01/01/2021     PAP  02/23/2021     DTAP/TDAP/TD IMMUNIZATION (3 - Td) 09/04/2022     COLORECTAL CANCER SCREENING  10/20/2026     COVID-19 Vaccine  Completed     INFLUENZA VACCINE  Addressed     Pneumococcal Vaccine: Pediatrics (0 to 5 Years) and At-Risk Patients (6 to 64 Years)  Aged Out     IPV IMMUNIZATION  Aged Out     MENINGITIS IMMUNIZATION  Aged Out     HEPATITIS B IMMUNIZATION  Aged Out     HIV SCREENING  Discontinued       Patient Instructions   Hold two days then decrease to 7.5mg four days per week and 10mg three days per week. Recheck in one week.      ABDON Hodges CNP  [facility]  Family Practice  [facility]  [phone]    For any issues my office # is [phone]

## 2021-07-14 NOTE — PATIENT INSTRUCTIONS
7.5mg on Thursdays, 10mg the rest of the days, recheck in two weeks. If normal, then you can go back to monthly.    Sparing use of Flexeril with severe cramps at night   Franny has been denied Repatha through the [employer] assistance program.    [employer] has changed their guidelines. Any patient that has any type of insurance is not eligible.    Socorro Morrison  Prescription   Pharmacy Assistance  [phone]

## 2021-07-19 ASSESSMENT — ENCOUNTER SYMPTOMS
WEAKNESS: 0
SEIZURES: 0
DISTURBANCES IN COORDINATION: 0
BRUISES/BLEEDS EASILY: 1
SPEECH CHANGE: 0
PARALYSIS: 0
NUMBNESS: 0
TINGLING: 0
HEADACHES: 0
TREMORS: 0
LOSS OF CONSCIOUSNESS: 0
DIZZINESS: 0
SWOLLEN GLANDS: 0
MEMORY LOSS: 1

## 2021-07-20 VITALS — DIASTOLIC BLOOD PRESSURE: 80 MMHG | BODY MASS INDEX: 43.5 KG/M2 | SYSTOLIC BLOOD PRESSURE: 114 MMHG | WEIGHT: 259.4 LBS

## 2021-07-20 DIAGNOSIS — Z79.01 LONG TERM CURRENT USE OF ANTICOAGULANT THERAPY: ICD-10-CM

## 2021-07-20 DIAGNOSIS — G08 CEREBRAL VENOUS THROMBOSIS OF CORTICAL VEIN: ICD-10-CM

## 2021-07-20 LAB — INR PPP: 2.9 (ref 2–3)

## 2021-07-20 PROCEDURE — 99213 OFFICE O/P EST LOW 20 MIN: CPT | Performed by: NURSE PRACTITIONER

## 2021-07-20 PROCEDURE — 36416 COLLJ CAPILLARY BLOOD SPEC: CPT | Performed by: NURSE PRACTITIONER

## 2021-07-20 PROCEDURE — 85610 PROTHROMBIN TIME: CPT | Performed by: NURSE PRACTITIONER

## 2021-07-20 NOTE — PATIENT INSTRUCTIONS
Release of information for the Pap (stop on the way out up front)  Keep up current warfarin and recheck in two weeks  Let Mikaela know insurance and mammogram

## 2021-07-21 ENCOUNTER — TELEPHONE (OUTPATIENT)
Dept: FAMILY MEDICINE | Facility: CLINIC | Age: 41
End: 2021-07-21

## 2021-07-21 DIAGNOSIS — Z12.31 ENCOUNTER FOR SCREENING MAMMOGRAM FOR BREAST CANCER: Primary | ICD-10-CM

## 2021-07-21 NOTE — CONFIDENTIAL NOTE
Patient requesting order for screening mammograms. [facility] has discussed with Mikaela Jackman CNP her desire to start mammograms now at age 41. Patient has contacted insurance and states they have approved her to have mammos. [facility]  - they need an order due to age is less than age 40.   Plan: okay per Mikaela Jackman CNP to send order to [facility] [place]. This was done and patient informed.  Tiffanie Zarco RN

## 2021-07-22 ENCOUNTER — OFFICE VISIT (OUTPATIENT)
Dept: NEUROLOGY | Facility: CLINIC | Age: 41
End: 2021-07-22
Payer: COMMERCIAL

## 2021-07-22 VITALS
WEIGHT: 258 LBS | DIASTOLIC BLOOD PRESSURE: 74 MMHG | BODY MASS INDEX: 44.05 KG/M2 | HEIGHT: 64 IN | OXYGEN SATURATION: 97 % | RESPIRATION RATE: 16 BRPM | HEART RATE: 76 BPM | SYSTOLIC BLOOD PRESSURE: 111 MMHG

## 2021-07-22 DIAGNOSIS — G08 CEREBRAL VENOUS THROMBOSIS OF CORTICAL VEIN: Primary | ICD-10-CM

## 2021-07-22 PROCEDURE — 99214 OFFICE O/P EST MOD 30 MIN: CPT | Performed by: PSYCHIATRY & NEUROLOGY

## 2021-07-22 ASSESSMENT — PAIN SCALES - GENERAL: PAINLEVEL: NO PAIN (0)

## 2021-07-22 ASSESSMENT — MIFFLIN-ST. JEOR: SCORE: 1830.28

## 2021-07-22 NOTE — PROGRESS NOTES
36 minutes spent on the date of the encounter doing chart review and review of outside records and documentation and patient visits         Return in about 3 months (around 10/22/2021).    Ghada Earl MD  [facility] NEUROLOGY CLINIC [place]          __________________________________________________________      [facility]    at [facility]  __________________________________________________________    Chief Complaint: No chief complaint on file.      History of Present Illness: Paulina Diana is a 40 year old female presenting for follow-up for CVST.     According to the patient she presented to hospital after experiencing sudden onset R temple severe throbbing headache with nausea vomiting photophobia and phonophobia. She slept for about 12 hours and woke up noticing R arm numbness from hand till elbow and she was dropping things this prompted her to contact her doctor who advised her to visit ER. While in ER she was found to have CVST.  She was hospitalized from 10/17/2020 till 10/21/2020. She was transferred to [facility] for further evaluation. CT head showed hyperdensities suggestive of sinus vein thrombosis, CTV revealed SSS, R transversae sinus, sigmoid sinuse and upper jugular thrombosis and questionable right veinous infarct.  CTP perfusion abnormalities did not correspond with arterial territories. Found to have CVST 2/2 prothrombotic state due to recent ectopic pregnancy for which she was given methotrexate. She was treated with heparin gtt charles intensity treatment, eventually transitioned warfarin with Lovenox bridge.      She reports two weeks prior to headache she had severe abdominal pain and was found to have ectopic pregnancy She was given two doses of Methotrexate. She was 7 to 8 weeks in pregnancy.     She is doing good. No headache. She reports bilateral hand and feet numbness and that that everything takes  like a lemon. She is compliant with Coumadin with a stable INR.She was diagnosed with COVID on 8Th of Nov and had mild symptoms with just sinus infection and cough. She has recovered and doing well.    She denies any episodes of weakness numbness tingling loss of vision blurry vision double vision loss of balance changes in chewing swallowing or speech that lasted for 24 hours and resolved. She also denies hx of recurrent sinus infections, lung infections.    Currently she denies any new headaches or recurrence of headache. She denies any visual obscurations and or pulsatile tinnitus. She denies any loss of vision blurry vision or changes with vision. She is seeing vascular medicine. Plan is to repeat MRV in one year around Oct 2021 and till then she will continue to take anticoagulation. She reports memory issues since January. Memory issues has been stable and has not got worsened. As per patient few events where she has noticed memory issues includes missing details at work and recalling it later. She may drive and miss her exits and then realize it later. She denies any difficulty with any ADLs or IADLS.     Work-up as stated below under Pertinent Investigations     Hypercoagulable workup as follows:   - Protein C  - Protein S  - Cardiolipin  - Antithrombin III  - Factor 2 and 5 mutation  - Factor 5 assay  - Factor 2 assay elevated  - Beta 2 glycoprotein  - Lupus anticoagulant  - Cardiolipin Ab negative  - Homocysteine elevated to 13.2  -MTHFR : heterozygous for the C677T mutation (thermolabile form) in   the MTHFR (5,10-methylenetetrahydrofolate reductase) gene     PIEDAD, SSA, SSB, DsDNA, ACE, RNP, anti Smith were  Negative (not elevated)    - CSF labs for leptomeningeal enhancement:  - Cell count + diff  - total protein  - glucose  - aerobic culture  - lyme disease negative  - HSV negative   - ACE slightly elevated to 2.7  - Oligoclonal Banding CSF negative  - EBV PCR CSF negative  - ACE CSF elevated 2.7  -  Cytology CSF There is an increased population of small cells morphologically consistent  with lymphocytes. While there are  no definitive features of hematolymphoid disorder, correlation with  associated flow cytometry ([id number]) is  recommended. Lymphocytosis maybe associated with infection or other  disorders other than hematolymphoid  diseases.  - Immuno: The total number of cells is low limiting the number of antibodies that  can be analyzed and limiting the   interpretation. Only B-cell antigens were evaluated. There is no immunophenotypic evidence of B-cell   non-Hodgkin lymphoma      CTV 10/17/2020: There is thrombosis within the dural venous sinuses at the superior sagittal sinus, right transverse sinus, sigmoid sinus, and upper jugular level.    MRV 10/18/2020: 1, No diffusion restriction to suggest venous infarct. Scattered leptomeningeal enhancement and associated diffusion restriction throughout the bilateral cerebral hemispheres, likely represents cortical venous congestion.   2. Total occlusion of the superior sagittal sinus, right transverse sinus, right sigmoid sinus, and proximal part of the right internal jugular vein.     MRV / MRI 12/9/2020: Improved patency of the superior sagittal, right transverse, and right sigmoid sinuses, with persistent partially occlusive thrombus. Persistent optic nerve sheath dilation and posterior globes flattening suggesting continued high intracranial pressure.    [facility] Eye exam: 1/25/2021: OCT and visual field within normal range in both eyes.       Impression:   Problem List Items Addressed This Visit        Circulatory    Cerebral venous thrombosis of cortical vein - Primary    Relevant Orders    Vitamin B12    Folate          40 year old female with no significant past medical hx presented to hospital in October for new onset headache and R arm numbness and clumsiness found to have extensive CVST for which she required hospitalization. Prior to this event  "about 2 weeks she was diagnosed with ectopic pregnancy and was given Methotrexate for treatment. Work up during hospitalization showed extensive CVST confirmed on MR imaging plus leptomeningeal enhancement. Follow up testing for hypercoagulable state showed increased homocysteine level and heterozygous MTHFR mutation. There are reports to suggests excessive methotrexate toxicity in patient with this mutations and elevated risk of thrombosis in children with ALL.Further work for leptomeningeal enhancement is detailed above and essentially was negative. In report of yesterdays MRI there was no mention of leptomeningeal enhancement mentioned. She denies any red flags of history of headache and or focal neurological deficits.    She reports of memory issues. MOCA test showed a score of 28/30. MRI brain did not show any parenchymal regions. Based on my evaluation today it appears the memory issues that she is facing are likely related to multifactorial issues likely related to current stress. She denies any symptoms of depression. She has a hx of hypothyroidism . She is off of Diamox as well.       Plan:   - Continue coumadin as prescribed with target INR 2-3  - Duration of anticoagulation to be determined after consultation with Dr Dickson. Plan to continue anticoagulation until October 2021 ( repeat Scan at that time)  - Off of diamox now.  - She is getting thyroid hormone test. I have added vitamin B12 and folate to evaluate for reversible causes of cognitive deficits.   - Counseled her about healthy sleep routine.  - Follow up in 3 months    She will call us with any questions.  For any acute neurologic deficits she was advised to  go directly to the hospital rather than call the clinic.    Ghada Earl MD  Neurology  07/22/2021 4:29 PM  To page me or covering stroke neurology team member, click here: [facility]COM  Choose \"On Call\" tab at top, then search dropdown box for \"Neurology Adult\" & press Enter, look for Neuro " ICU/Stroke    ___________________________________________________________________    Current Medications  Current Outpatient Medications   Medication Sig     acetaZOLAMIDE (DIAMOX) 250 MG tablet Take 2 tablets (500 mg) by mouth 2 times daily     cyclobenzaprine (FLEXERIL) 5 MG tablet Take 1 tablet (5 mg) by mouth 3 times daily as needed for muscle spasms     levothyroxine (SYNTHROID/LEVOTHROID) 112 MCG tablet TAKE 2 TABLETS(224 MCG) BY MOUTH DAILY     UNABLE TO FIND MEDICATION NAME: IUD     warfarin ANTICOAGULANT (COUMADIN) 5 MG tablet Take 1.5-2 tablets (7.5-10 mg) by mouth daily 7.5mg twice per week, 10mg five times per week     No current facility-administered medications for this visit.       Past Medical History  Past Medical History:   Diagnosis Date     Ectopic pregnancy, tubal 1/28/2018     Thyroid disease     hypothyroid       Social History  Social History     Tobacco Use     Smoking status: Never Smoker     Smokeless tobacco: Never Used     Tobacco comment:  on occasion   Substance Use Topics     Alcohol use: No     Alcohol/week: 0.0 standard drinks     Comment: rare     Drug use: No       Family History  No family history on file.    ROS: 10 point relevant ROS neg other than the symptoms noted above in the HPI.    Physical Exam    There were no vitals taken for this visit.    General:  no acute distress  HEENT:  normocephalic/atraumatic  Pulmonary:  no respiratory distress    Neurologic    MOCA 28/30  Mental Status:  alert, oriented x 3, follows commands, speech clear and fluent, naming and repetition normal  Cranial Nerves:  EOMI with normal smooth pursuit, facial movements symmetric, hearing not formally tested but intact to conversation, no dysarthria, shoulder shrug equal bilaterally, tongue protrusion midline  Motor:  no abnormal movements, able to move all limbs antigravity spontaneously with no signs of hemiparesis observed, no pronator drift  Reflexes:  Intact  Sensory:   Inatct  Coordination:  normal finger-to-nose and heel-to-shin bilaterally without dysmetria, rapid alternating movements symmetric  Station/Gait:  Normal    Neuroimaging: as per HPI. I personally reviewed those images and it showed extensive CVST on initial MRI which has improved. Leptomeningeal enhancement on initial MRI , on follow up MRI there is not much enhancement appreciated.    Labs:    Recent Labs   Lab Test 07/20/21  1600 06/29/21  1542 06/15/21  1340   INR 2.9 4.2* 2.6        Recent Labs   Lab Test 10/18/20  0024 12/12/19  1559   CHOL 163 185   HDL 53 62   LDL 78 111*   TRIG 165* 61       No lab results found.    Recent Labs   Lab Test 10/18/20  0024 10/17/20  1752   TROPI <0.015 <0.015

## 2021-07-22 NOTE — PROGRESS NOTES
Problem(s) Oriented visit        SUBJECTIVE:                                                    Paulina Diana is a 41 year old female who presents to clinic today for the following health issues :    CC: Cerebral sinus venous thrombosis    Anticoagulated with warfarin for CSVT. She has been taking warfarin 10mg three times weekly and 7.5mg four times per week. Goal INR 2-3, today's INR is 2.9. No missed doses. No changes in diet, alcohol, or any new viral illnesses. No headaches or nausea/vomiting recently. Had a couple brief self limiting epistaxes that she relates to dry air.    Problem list, Medication list, Allergies, and Medical/Social/Surgical histories reviewed in [facility] and updated as appropriate.   Additional history: as documented    ROS:  Gen, neuro, heme, skin, MSK negative except as listed per HPI    Histories:   Patient Active Problem List   Diagnosis     Atypical nevus     Acquired hypothyroidism     Dermatofibroma of face     Melanocytic nevus, unspecified location     Morbid obesity (H)     Cerebral venous thrombosis of cortical vein     Long term current use of anticoagulant therapy     Anticoagulation monitoring, INR range 2-3     Past Surgical History:   Procedure Laterality Date     DILATION AND CURETTAGE SUCTION N/A 5/27/2016    Procedure: DILATION AND CURETTAGE SUCTION;  Surgeon: Natacha Goyal MD;  Location: [facility]     ENT SURGERY      septal repair     LAPAROSCOPIC EVACUATION ECTOPIC PREGNANCY Left 1/28/2018    Procedure: LAPAROSCOPIC EVACUATION ECTOPIC PREGNANCY;  LAPAROSCOPIC SINGLE SITE EVACUATION OF ECTOPIC PREGNANCY, LEFT SALPINGECTOMY;  Surgeon: Erum Boles MD;  Location:  OR     LAPAROSCOPIC SALPINGECTOMY Left 1/28/2018    Procedure: LAPAROSCOPIC SALPINGECTOMY;;  Surgeon: Erum Boles MD;  Location:  OR     LAPAROSCOPIC SALPINGO-OOPHORECTOMY  11/2/2013    Procedure: LAPAROSCOPIC SALPINGO-OOPHORECTOMY;  Single port laparoscopic, Evacuation of  hemo-peritonium and Products of Conception;  Surgeon: Peggy Thornton MD;  Location: [facility] OR     NOSE SURGERY      age 12       Social History     Tobacco Use     Smoking status: Never Smoker     Smokeless tobacco: Never Used     Tobacco comment:  on occasion   Substance Use Topics     Alcohol use: No     Alcohol/week: 0.0 standard drinks     Comment: rare     No family history on file.      Current Outpatient Medications   Medication Sig Dispense Refill     acetaZOLAMIDE (DIAMOX) 250 MG tablet Take 2 tablets (500 mg) by mouth 2 times daily 120 tablet 3     cyclobenzaprine (FLEXERIL) 5 MG tablet Take 1 tablet (5 mg) by mouth 3 times daily as needed for muscle spasms 20 tablet 1     levothyroxine (SYNTHROID/LEVOTHROID) 112 MCG tablet TAKE 2 TABLETS(224 MCG) BY MOUTH DAILY 120 tablet 0     UNABLE TO FIND MEDICATION NAME: IUD       warfarin ANTICOAGULANT (COUMADIN) 5 MG tablet Take 1.5-2 tablets (7.5-10 mg) by mouth daily 7.5mg twice per week, 10mg five times per week 150 tablet 3       OBJECTIVE:                                                    Physical Exam:    /80   Wt 117.7 kg (259 lb 6.4 oz)   BMI 43.50 kg/m      CONSTITUTIONAL: Alert non-toxic appearing female in no acute distress  NEUROLOGIC: grossly intact  SKIN: No obvious bruising  PSYCHIATRIC: Pleasant and interactive, affect euthymic, makes appropriate eye contact, thought process logical       ASSESSMENT/PLAN:                                                      Paulina was seen today for inr followup.    Diagnoses and all orders for this visit:    Cerebral venous thrombosis of cortical vein  -     Prothrombin - INR ([facility])    Long term current use of anticoagulant therapy  -     Prothrombin - INR ([facility])      Therapeutic, continue warfarin 7.5mg four times per week and 10mg three times per week, recheck in two weeks.    Patient needs assistance with ADLs: none identified today  Patient needs assistance with iADLs: none identified today    The  following health maintenance items are reviewed in Epic and correct as of today:  Health Maintenance   Topic Date Due     PREVENTIVE CARE VISIT  Never done     ADVANCE CARE PLANNING  Never done     EYE EXAM  Never done     HEPATITIS C SCREENING  Never done     PHQ-2  01/01/2021     PAP  02/23/2021     INFLUENZA VACCINE (1) 09/01/2021     DTAP/TDAP/TD IMMUNIZATION (3 - Td or Tdap) 09/04/2022     COLORECTAL CANCER SCREENING  10/20/2026     COVID-19 Vaccine  Completed     Pneumococcal Vaccine: Pediatrics (0 to 5 Years) and At-Risk Patients (6 to 64 Years)  Aged Out     IPV IMMUNIZATION  Aged Out     MENINGITIS IMMUNIZATION  Aged Out     HEPATITIS B IMMUNIZATION  Aged Out     HIV SCREENING  Discontinued       Patient Instructions   Release of information for the Pap (stop on the way out up front)  Keep up current warfarin and recheck in two weeks  Let Mikaela know insurance and mammogram      ABDON Hodges CNP  [facility]  Family Practice  [facility]  [phone]    For any issues my office # is [phone]

## 2021-07-22 NOTE — LETTER
7/22/2021       RE: Paulina Diana  [id number]  [facility] [address]     Dear Colleague,    Thank you for referring your patient, Paulina Diana, to the [facility] [place] at [facility]. Please see a copy of my visit note below.      36 minutes spent on the date of the encounter doing chart review and review of outside records and documentation and patient visits    Return in about 3 months (around 10/22/2021).    Ghada Earl MD  [facility] [place]    __________________________________________________________    [facility]    at [facility]  __________________________________________________________    Chief Complaint: No chief complaint on file.    History of Present Illness: Paulina Diana is a 40 year old female presenting for follow-up for CVST.     According to the patient she presented to hospital after experiencing sudden onset R temple severe throbbing headache with nausea vomiting photophobia and phonophobia. She slept for about 12 hours and woke up noticing R arm numbness from hand till elbow and she was dropping things this prompted her to contact her doctor who advised her to visit ER. While in ER she was found to have CVST.  She was hospitalized from 10/17/2020 till 10/21/2020. She was transferred to [facility] for further evaluation. CT head showed hyperdensities suggestive of sinus vein thrombosis, CTV revealed SSS, R transversae sinus, sigmoid sinuse and upper jugular thrombosis and questionable right veinous infarct.  CTP perfusion abnormalities did not correspond with arterial territories. Found to have CVST 2/2 prothrombotic state due to recent ectopic pregnancy for which she was given methotrexate. She was treated with heparin gtt charles intensity treatment, eventually transitioned warfarin with Lovenox bridge.      She  reports two weeks prior to headache she had severe abdominal pain and was found to have ectopic pregnancy She was given two doses of Methotrexate. She was 7 to 8 weeks in pregnancy.     She is doing good. No headache. She reports bilateral hand and feet numbness and that that everything takes like a lemon. She is compliant with Coumadin with a stable INR.She was diagnosed with COVID on 8Th of Nov and had mild symptoms with just sinus infection and cough. She has recovered and doing well.    She denies any episodes of weakness numbness tingling loss of vision blurry vision double vision loss of balance changes in chewing swallowing or speech that lasted for 24 hours and resolved. She also denies hx of recurrent sinus infections, lung infections.    Currently she denies any new headaches or recurrence of headache. She denies any visual obscurations and or pulsatile tinnitus. She denies any loss of vision blurry vision or changes with vision. She is seeing vascular medicine. Plan is to repeat MRV in one year around Oct 2021 and till then she will continue to take anticoagulation. She reports memory issues since January. Memory issues has been stable and has not got worsened. As per patient few events where she has noticed memory issues includes missing details at work and recalling it later. She may drive and miss her exits and then realize it later. She denies any difficulty with any ADLs or IADLS.     Work-up as stated below under Pertinent Investigations     Hypercoagulable workup as follows:   - Protein C  - Protein S  - Cardiolipin  - Antithrombin III  - Factor 2 and 5 mutation  - Factor 5 assay  - Factor 2 assay elevated  - Beta 2 glycoprotein  - Lupus anticoagulant  - Cardiolipin Ab negative  - Homocysteine elevated to 13.2  -MTHFR : heterozygous for the C677T mutation (thermolabile form) in   the MTHFR (5,10-methylenetetrahydrofolate reductase) gene     PIEDAD, SSA, SSB, DsDNA, ACE, RNP, anti Lindquist were  Negative  (not elevated)    - CSF labs for leptomeningeal enhancement:  - Cell count + diff  - total protein  - glucose  - aerobic culture  - lyme disease negative  - HSV negative   - ACE slightly elevated to 2.7  - Oligoclonal Banding CSF negative  - EBV PCR CSF negative  - ACE CSF elevated 2.7  - Cytology CSF There is an increased population of small cells morphologically consistent  with lymphocytes. While there are  no definitive features of hematolymphoid disorder, correlation with  associated flow cytometry ([id number]) is  recommended. Lymphocytosis maybe associated with infection or other  disorders other than hematolymphoid  diseases.  - Immuno: The total number of cells is low limiting the number of antibodies that  can be analyzed and limiting the   interpretation. Only B-cell antigens were evaluated. There is no immunophenotypic evidence of B-cell   non-Hodgkin lymphoma      CTV 10/17/2020: There is thrombosis within the dural venous sinuses at the superior sagittal sinus, right transverse sinus, sigmoid sinus, and upper jugular level.    MRV 10/18/2020: 1, No diffusion restriction to suggest venous infarct. Scattered leptomeningeal enhancement and associated diffusion restriction throughout the bilateral cerebral hemispheres, likely represents cortical venous congestion.   2. Total occlusion of the superior sagittal sinus, right transverse sinus, right sigmoid sinus, and proximal part of the right internal jugular vein.     MRV / MRI 12/9/2020: Improved patency of the superior sagittal, right transverse, and right sigmoid sinuses, with persistent partially occlusive thrombus. Persistent optic nerve sheath dilation and posterior globes flattening suggesting continued high intracranial pressure.    [facility] Eye exam: 1/25/2021: OCT and visual field within normal range in both eyes.       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 Prior to this event about 2 weeks she was diagnosed with ectopic pregnancy and was given Methotrexate for treatment. Work up during hospitalization showed extensive CVST confirmed on MR imaging plus leptomeningeal enhancement. Follow up testing for hypercoagulable state showed increased homocysteine level and heterozygous MTHFR mutation. There are reports to suggests excessive methotrexate toxicity in patient with this mutations and elevated risk of thrombosis in children with ALL.Further work for leptomeningeal enhancement is detailed above and essentially was negative. In report of yesterdays MRI there was no mention of leptomeningeal enhancement mentioned. She denies any red flags of history of headache and or focal neurological deficits.    She reports of memory issues. MOCA test showed a score of 28/30. MRI brain did not show any parenchymal regions. Based on my evaluation today it appears the memory issues that she is facing are likely related to multifactorial issues likely related to current stress. She denies any symptoms of depression. She has a hx of hypothyroidism . She is off of Diamox as well.       Plan:   - Continue coumadin as prescribed with target INR 2-3  - Duration of anticoagulation to be determined after consultation with Dr Dickson. Plan to continue anticoagulation until October 2021 ( repeat Scan at that time)  - Off of diamox now.  - She is getting thyroid hormone test. I have added vitamin B12 and folate to evaluate for reversible causes of cognitive deficits.   - Counseled her about healthy sleep routine.  - Follow up in 3 months    She will call us with any questions.  For any acute neurologic deficits she was advised to  go  "directly to the hospital rather than call the clinic.    Ghada Earl MD  Neurology  07/22/2021 4:29 PM  To page me or covering stroke neurology team member, click here: [facility]  Choose \"On Call\" tab at top, then search dropdown box for \"Neurology Adult\" & press Enter, look for Neuro ICU/Stroke    ___________________________________________________________________    Current Medications  Current Outpatient Medications   Medication Sig     acetaZOLAMIDE (DIAMOX) 250 MG tablet Take 2 tablets (500 mg) by mouth 2 times daily     cyclobenzaprine (FLEXERIL) 5 MG tablet Take 1 tablet (5 mg) by mouth 3 times daily as needed for muscle spasms     levothyroxine (SYNTHROID/LEVOTHROID) 112 MCG tablet TAKE 2 TABLETS(224 MCG) BY MOUTH DAILY     UNABLE TO FIND MEDICATION NAME: IUD     warfarin ANTICOAGULANT (COUMADIN) 5 MG tablet Take 1.5-2 tablets (7.5-10 mg) by mouth daily 7.5mg twice per week, 10mg five times per week     No current facility-administered medications for this visit.       Past Medical History  Past Medical History:   Diagnosis Date     Ectopic pregnancy, tubal 1/28/2018     Thyroid disease     hypothyroid       Social History  Social History     Tobacco Use     Smoking status: Never Smoker     Smokeless tobacco: Never Used     Tobacco comment:  on occasion   Substance Use Topics     Alcohol use: No     Alcohol/week: 0.0 standard drinks     Comment: rare     Drug use: No       Family History  No family history on file.    ROS: 10 point relevant ROS neg other than the symptoms noted above in the HPI.    Physical Exam    There were no vitals taken for this visit.    General:  no acute distress  HEENT:  normocephalic/atraumatic  Pulmonary:  no respiratory distress    Neurologic    MOCA 28/30  Mental Status:  alert, oriented x 3, follows commands, speech clear and fluent, naming and repetition normal  Cranial Nerves:  EOMI with normal smooth pursuit, facial movements symmetric, hearing not formally tested but " intact to conversation, no dysarthria, shoulder shrug equal bilaterally, tongue protrusion midline  Motor:  no abnormal movements, able to move all limbs antigravity spontaneously with no signs of hemiparesis observed, no pronator drift  Reflexes:  Intact  Sensory:  Inatct  Coordination:  normal finger-to-nose and heel-to-shin bilaterally without dysmetria, rapid alternating movements symmetric  Station/Gait:  Normal    Neuroimaging: as per HPI. I personally reviewed those images and it showed extensive CVST on initial MRI which has improved. Leptomeningeal enhancement on initial MRI , on follow up MRI there is not much enhancement appreciated.    Labs:    Recent Labs   Lab Test 07/20/21  1600 06/29/21  1542 06/15/21  1340   INR 2.9 4.2* 2.6        Recent Labs   Lab Test 10/18/20  0024 12/12/19  1559   CHOL 163 185   HDL 53 62   LDL 78 111*   TRIG 165* 61       No lab results found.    Recent Labs   Lab Test 10/18/20  0024 10/17/20  1752   TROPI <0.015 <0.015       Again, thank you for allowing me to participate in the care of your patient.      Sincerely,    Ghada Earl MD

## 2021-07-26 ENCOUNTER — TRANSFERRED RECORDS (OUTPATIENT)
Dept: FAMILY MEDICINE | Facility: CLINIC | Age: 41
End: 2021-07-26

## 2021-07-28 ENCOUNTER — TELEPHONE (OUTPATIENT)
Dept: OTHER | Facility: CLINIC | Age: 41
End: 2021-07-28

## 2021-07-28 NOTE — TELEPHONE ENCOUNTER
Patient is already scheduled for MRV brain 10/4/21    Please contact to also schedule:      D-dimer early October    In clinic visit with Dr. Dickson one week after MRV, 3+ days after d-dimer.    Yamel Morin RN BSN  [facility]  [phone]

## 2021-08-02 ENCOUNTER — HOSPITAL ENCOUNTER (OUTPATIENT)
Dept: MAMMOGRAPHY | Facility: CLINIC | Age: 41
Discharge: HOME | End: 2021-08-02
Attending: NURSE PRACTITIONER | Admitting: NURSE PRACTITIONER
Payer: COMMERCIAL

## 2021-08-02 DIAGNOSIS — Z12.31 ENCOUNTER FOR SCREENING MAMMOGRAM FOR BREAST CANCER: ICD-10-CM

## 2021-08-02 PROCEDURE — 77063 BREAST TOMOSYNTHESIS BI: CPT

## 2021-08-02 NOTE — TELEPHONE ENCOUNTER
Scheduled as follows:    9/27/2021 Labs  10/4/2021 MRV Brain   10/11/2021 Dr. Dickson in person    Becca PAK

## 2021-08-03 DIAGNOSIS — Z79.01 LONG TERM CURRENT USE OF ANTICOAGULANT THERAPY: ICD-10-CM

## 2021-08-03 DIAGNOSIS — G08 CEREBRAL VENOUS THROMBOSIS OF CORTICAL VEIN: ICD-10-CM

## 2021-08-03 LAB — INR PPP: 1.8 (ref 2–3)

## 2021-08-03 PROCEDURE — 85610 PROTHROMBIN TIME: CPT | Performed by: NURSE PRACTITIONER

## 2021-08-03 PROCEDURE — 99213 OFFICE O/P EST LOW 20 MIN: CPT | Performed by: NURSE PRACTITIONER

## 2021-08-03 PROCEDURE — 36415 COLL VENOUS BLD VENIPUNCTURE: CPT | Performed by: NURSE PRACTITIONER

## 2021-08-03 NOTE — PROGRESS NOTES
Problem(s) Oriented visit        SUBJECTIVE:                                                    Paulina Diana is a 41 year old female who presents to clinic today for the following health issues :    CC: Cerebral sinus venous thrombosis    Anticoagulated with warfarin for CSVT. She has been taking warfarin 10mg three times weekly and 7.5mg four times per week. Goal INR 2-3, today's INR is 1.8. No missed doses. No changes in diet, alcohol, or any new viral illnesses. No headaches or nausea/vomiting recently. Having some oozing bleeding from scrapes and cuts at work.    Problem list, Medication list, Allergies, and Medical/Social/Surgical histories reviewed in [facility] and updated as appropriate.   Additional history: as documented    ROS:  Gen, neuro, heme, skin, MSK negative except as listed per HPI    Histories:   Patient Active Problem List   Diagnosis     Atypical nevus     Acquired hypothyroidism     Dermatofibroma of face     Melanocytic nevus, unspecified location     Morbid obesity (H)     Cerebral venous thrombosis of cortical vein     Long term current use of anticoagulant therapy     Anticoagulation monitoring, INR range 2-3     Past Surgical History:   Procedure Laterality Date     DILATION AND CURETTAGE SUCTION N/A 5/27/2016    Procedure: DILATION AND CURETTAGE SUCTION;  Surgeon: Natacha Goyal MD;  Location: [facility]     ENT SURGERY      septal repair     LAPAROSCOPIC EVACUATION ECTOPIC PREGNANCY Left 1/28/2018    Procedure: LAPAROSCOPIC EVACUATION ECTOPIC PREGNANCY;  LAPAROSCOPIC SINGLE SITE EVACUATION OF ECTOPIC PREGNANCY, LEFT SALPINGECTOMY;  Surgeon: Erum Boles MD;  Location:  OR     LAPAROSCOPIC SALPINGECTOMY Left 1/28/2018    Procedure: LAPAROSCOPIC SALPINGECTOMY;;  Surgeon: Erum Boles MD;  Location:  OR     LAPAROSCOPIC SALPINGO-OOPHORECTOMY  11/2/2013    Procedure: LAPAROSCOPIC SALPINGO-OOPHORECTOMY;  Single port laparoscopic, Evacuation of hemo-peritonium and  Products of Conception;  Surgeon: Peggy Thornton MD;  Location: [facility] OR     NOSE SURGERY      age 12       Social History     Tobacco Use     Smoking status: Never Smoker     Smokeless tobacco: Never Used     Tobacco comment:  on occasion   Substance Use Topics     Alcohol use: No     Alcohol/week: 0.0 standard drinks     Comment: rare     No family history on file.      Current Outpatient Medications   Medication Sig Dispense Refill     levothyroxine (SYNTHROID/LEVOTHROID) 112 MCG tablet TAKE 2 TABLETS(224 MCG) BY MOUTH DAILY 120 tablet 0     UNABLE TO FIND MEDICATION NAME: IUD       warfarin ANTICOAGULANT (COUMADIN) 5 MG tablet Take 1.5-2 tablets (7.5-10 mg) by mouth daily 7.5mg twice per week, 10mg five times per week 150 tablet 3       OBJECTIVE:                                                    Physical Exam:    There were no vitals taken for this visit.    CONSTITUTIONAL: Alert non-toxic appearing female in no acute distress  NEUROLOGIC: grossly intact  SKIN: No obvious bruising; some oozing from a wound on the thumb  PSYCHIATRIC: Pleasant and interactive, affect euthymic, makes appropriate eye contact, thought process logical       ASSESSMENT/PLAN:                                                      Paulina was seen today for inr followup.    Diagnoses and all orders for this visit:    Cerebral venous thrombosis of cortical vein  -     Prothrombin - INR ([facility])  -     Vitamin B12 and Folate ([facility])  -     VENOUS COLLECTION    Checking B12 and folate per neuro.    Long term current use of anticoagulant therapy  -     Prothrombin - INR ([facility])      Subtherapeutic but still having oozing sores- she tends to have a delayed reaction to dose adjustments. Continue warfarin 7.5mg four times per week and 10mg three times per week, recheck in two weeks, adjust dose if still abnormal then.    Patient needs assistance with ADLs: none identified today  Patient needs assistance with iADLs: none identified today    The  following health maintenance items are reviewed in Epic and correct as of today:  Health Maintenance   Topic Date Due     PREVENTIVE CARE VISIT  Never done     ADVANCE CARE PLANNING  Never done     EYE EXAM  Never done     HEPATITIS C SCREENING  Never done     PAP  02/23/2021     INFLUENZA VACCINE (1) 09/01/2021     DTAP/TDAP/TD IMMUNIZATION (3 - Td or Tdap) 09/04/2022     COLORECTAL CANCER SCREENING  10/20/2026     PHQ-2  Completed     COVID-19 Vaccine  Completed     Pneumococcal Vaccine: Pediatrics (0 to 5 Years) and At-Risk Patients (6 to 64 Years)  Aged Out     IPV IMMUNIZATION  Aged Out     MENINGITIS IMMUNIZATION  Aged Out     HEPATITIS B IMMUNIZATION  Aged Out     HIV SCREENING  Discontinued       Patient Instructions   Continue the current plan and recheck in 1-2 weeks  Try a mepilex dressing      ABDON Hodges CNP  [facility]  Family Practice  [facility]  [phone]    For any issues my office # is [phone]

## 2021-08-05 LAB
FOLATE: 5.6 NG/ML
VIT B12 SERPL-MCNC: 370 PG/ML (ref 232–1245)

## 2021-08-20 DIAGNOSIS — G08 CEREBRAL VENOUS THROMBOSIS OF CORTICAL VEIN: ICD-10-CM

## 2021-08-20 DIAGNOSIS — Z79.01 LONG TERM CURRENT USE OF ANTICOAGULANT THERAPY: ICD-10-CM

## 2021-08-20 LAB — INR PPP: 2.4 (ref 2–3)

## 2021-08-20 PROCEDURE — 85610 PROTHROMBIN TIME: CPT | Performed by: NURSE PRACTITIONER

## 2021-08-20 PROCEDURE — 36416 COLLJ CAPILLARY BLOOD SPEC: CPT | Performed by: NURSE PRACTITIONER

## 2021-08-29 DIAGNOSIS — Z76.0 ENCOUNTER FOR MEDICATION REFILL: ICD-10-CM

## 2021-08-30 RX ORDER — LEVOTHYROXINE SODIUM 112 UG/1
TABLET ORAL
Qty: 120 TABLET | Refills: 0 | Status: SHIPPED | OUTPATIENT
Start: 2021-08-30 | End: 2021-10-31

## 2021-09-10 VITALS — SYSTOLIC BLOOD PRESSURE: 124 MMHG | DIASTOLIC BLOOD PRESSURE: 76 MMHG | WEIGHT: 258.2 LBS | BODY MASS INDEX: 44.32 KG/M2

## 2021-09-10 DIAGNOSIS — Z79.01 LONG TERM CURRENT USE OF ANTICOAGULANT THERAPY: ICD-10-CM

## 2021-09-10 DIAGNOSIS — G08 CEREBRAL VENOUS THROMBOSIS OF CORTICAL VEIN: ICD-10-CM

## 2021-09-10 LAB — INR PPP: 2.5 (ref 2–3)

## 2021-09-10 PROCEDURE — 36416 COLLJ CAPILLARY BLOOD SPEC: CPT | Performed by: NURSE PRACTITIONER

## 2021-09-10 PROCEDURE — 85610 PROTHROMBIN TIME: CPT | Performed by: NURSE PRACTITIONER

## 2021-09-27 ENCOUNTER — LAB (OUTPATIENT)
Dept: LAB | Facility: CLINIC | Age: 41
End: 2021-09-27
Attending: INTERNAL MEDICINE
Payer: COMMERCIAL

## 2021-09-27 ENCOUNTER — TRANSFERRED RECORDS (OUTPATIENT)
Dept: FAMILY MEDICINE | Facility: CLINIC | Age: 41
End: 2021-09-27

## 2021-09-27 DIAGNOSIS — G08 CEREBRAL VENOUS THROMBOSIS OF CORTICAL VEIN: ICD-10-CM

## 2021-09-27 LAB — D DIMER PPP FEU-MCNC: <0.27 UG/ML FEU (ref 0–0.5)

## 2021-09-27 PROCEDURE — 36415 COLL VENOUS BLD VENIPUNCTURE: CPT

## 2021-09-27 PROCEDURE — 85379 FIBRIN DEGRADATION QUANT: CPT

## 2021-10-01 ENCOUNTER — TRANSFERRED RECORDS (OUTPATIENT)
Dept: FAMILY MEDICINE | Facility: CLINIC | Age: 41
End: 2021-10-01

## 2021-10-04 ENCOUNTER — TELEPHONE (OUTPATIENT)
Dept: OTHER | Facility: CLINIC | Age: 41
End: 2021-10-04

## 2021-10-04 ENCOUNTER — HOSPITAL ENCOUNTER (OUTPATIENT)
Dept: MRI IMAGING | Facility: CLINIC | Age: 41
Discharge: HOME | End: 2021-10-04
Attending: INTERNAL MEDICINE | Admitting: INTERNAL MEDICINE
Payer: COMMERCIAL

## 2021-10-04 DIAGNOSIS — G08 CEREBRAL VENOUS THROMBOSIS OF CORTICAL VEIN: ICD-10-CM

## 2021-10-04 PROCEDURE — 70546 MR ANGIOGRAPH HEAD W/O&W/DYE: CPT

## 2021-10-04 PROCEDURE — A9585 GADOBUTROL INJECTION: HCPCS | Performed by: INTERNAL MEDICINE

## 2021-10-04 PROCEDURE — 255N000002 HC RX 255 OP 636: Performed by: INTERNAL MEDICINE

## 2021-10-04 RX ORDER — WARFARIN SODIUM 5 MG/1
TABLET ORAL
Qty: 150 TABLET | Refills: 3 | Status: SHIPPED | OUTPATIENT
Start: 2021-10-04 | End: 2022-01-25

## 2021-10-04 RX ORDER — GADOBUTROL 604.72 MG/ML
15 INJECTION INTRAVENOUS ONCE
Status: COMPLETED | OUTPATIENT
Start: 2021-10-04 | End: 2021-10-04

## 2021-10-04 RX ADMIN — GADOBUTROL 12 ML: 604.72 INJECTION INTRAVENOUS at 09:21

## 2021-10-04 NOTE — TELEPHONE ENCOUNTER
[facility]    Who is the name of the provider?:  Randolph      What is the location you see this provider at?: [place]    Reason for call:  Has questions re MRI results.  Has seen in MyChart but does not understand.     Can we leave a detailed message on this number?  YES

## 2021-10-04 NOTE — TELEPHONE ENCOUNTER
Called patient, reassured her that her MRV showed improvement and Dr. Dickson would explain further at her appointment on 10/11/21.    Yamel Morin RN BSN  [facility]  [phone]

## 2021-10-14 VITALS — SYSTOLIC BLOOD PRESSURE: 112 MMHG | DIASTOLIC BLOOD PRESSURE: 74 MMHG | WEIGHT: 261.2 LBS | BODY MASS INDEX: 44.83 KG/M2

## 2021-10-14 DIAGNOSIS — Z79.01 LONG TERM CURRENT USE OF ANTICOAGULANT THERAPY: ICD-10-CM

## 2021-10-14 DIAGNOSIS — G08 CEREBRAL VENOUS THROMBOSIS OF CORTICAL VEIN: ICD-10-CM

## 2021-10-14 LAB — INR PPP: 2.1 (ref 2–3)

## 2021-10-14 PROCEDURE — 36416 COLLJ CAPILLARY BLOOD SPEC: CPT | Performed by: NURSE PRACTITIONER

## 2021-10-14 PROCEDURE — 85610 PROTHROMBIN TIME: CPT | Performed by: NURSE PRACTITIONER

## 2021-10-24 ENCOUNTER — HEALTH MAINTENANCE LETTER (OUTPATIENT)
Age: 41
End: 2021-10-24

## 2021-10-31 DIAGNOSIS — Z76.0 ENCOUNTER FOR MEDICATION REFILL: ICD-10-CM

## 2021-10-31 RX ORDER — LEVOTHYROXINE SODIUM 112 UG/1
TABLET ORAL
Qty: 120 TABLET | Refills: 0 | Status: SHIPPED | OUTPATIENT
Start: 2021-10-31 | End: 2022-01-17

## 2021-11-02 ENCOUNTER — OFFICE VISIT (OUTPATIENT)
Dept: OTHER | Facility: CLINIC | Age: 41
End: 2021-11-02
Attending: INTERNAL MEDICINE
Payer: COMMERCIAL

## 2021-11-02 VITALS
HEART RATE: 69 BPM | OXYGEN SATURATION: 96 % | WEIGHT: 262 LBS | DIASTOLIC BLOOD PRESSURE: 83 MMHG | RESPIRATION RATE: 16 BRPM | HEIGHT: 64 IN | BODY MASS INDEX: 44.73 KG/M2 | SYSTOLIC BLOOD PRESSURE: 130 MMHG

## 2021-11-02 DIAGNOSIS — G08 CEREBRAL VENOUS THROMBOSIS OF CORTICAL VEIN: Primary | ICD-10-CM

## 2021-11-02 PROCEDURE — G0463 HOSPITAL OUTPT CLINIC VISIT: HCPCS

## 2021-11-02 PROCEDURE — 99214 OFFICE O/P EST MOD 30 MIN: CPT | Performed by: INTERNAL MEDICINE

## 2021-11-02 ASSESSMENT — ENCOUNTER SYMPTOMS
BLOOD IN STOOL: 0
BRUISES/BLEEDS EASILY: 1
JAUNDICE: 0
NAUSEA: 0
HEARTBURN: 1
BLOATING: 0
ABDOMINAL PAIN: 0
VOMITING: 0
BOWEL INCONTINENCE: 0
RECTAL PAIN: 0
CONSTIPATION: 1
DIARRHEA: 0

## 2021-11-02 ASSESSMENT — MIFFLIN-ST. JEOR: SCORE: 1848.42

## 2021-11-02 NOTE — PROGRESS NOTES
Paulina Diana is a 41 year old female who is presenting at the current time to discuss her diagnosi(es) of     Cerebral venous thrombosis of cortical vein  .           HPI: Paulina Diana is a 41 year old [id number] female admitted 8/10/20 to [facility] for presumed ectopic cornu pregnancy S/P IM MTX w/ resolution of ectopic w/o prior h/o VTE who then presented to [facility] 10/17/2020 with persistent headache x 3 days that woke up her from sleep and was found to have CVST. Symptoms included photophobia, nausea and vomiting. In addition she had numbness and clumsiness of right hand. She was transferred to [facility] for further evaluation. CT head showed hyperdensities suggestive of sinus vein thrombosis, CTV revealed SSS, R transversae sinus, sigmoid sinus and upper jugular thrombosis and questionable right venous infarct.  CT perfusion abnormalities did not correspond with arterial territories. Found to have CVST 2/2 prothrombotic state due to recent ectopic pregnancy. She was treated with heparin gtt high intensity treatment, and eventually transitioned to warfarin with Lovenox bridge.  Also found incidentally during workup was leptomeningeal enhancement on MRI Brain, being worked up by neurology.     She is a prothrombin heterozygote with elevated homocysteine.        Since having been last seen, MRV has documented recanalization of the CVSTs, and her MTHFR mutation was noted to be normal. Complement levels drawn due to ANCA elevation were normal. She has no residual neurologic sxs, and no headaches.     When last seen last year,  I stated would favor at least 12 months AC with warfarin. I would not switch her to Xarelto. I would check a d dimer and repeat MRV in , and see her back one week later. I would favor longer term AC in this individual beyond one year if she has any other intervening clinical thrombotic difficulties. D dimer is now normal, and MRV reveals almost complete recanalization.             Review Of  Systems  Skin: negative  Eyes: negative  Ears/Nose/Throat: negative  Respiratory: No shortness of breath, dyspnea on exertion, cough, or hemoptysis  Cardiovascular: negative  Gastrointestinal: negative  Genitourinary: negative  Musculoskeletal: negative  Neurologic: negative  Psychiatric: negative  Hematologic/Lymphatic/Immunologic: negative  Endocrine: negative      PAST MEDICAL HISTORY:                  Past Medical History:   Diagnosis Date     Ectopic pregnancy, tubal 1/28/2018     Thyroid disease     hypothyroid       PAST SURGICAL HISTORY:                  Past Surgical History:   Procedure Laterality Date     DILATION AND CURETTAGE SUCTION N/A 5/27/2016    Procedure: DILATION AND CURETTAGE SUCTION;  Surgeon: Natacha Goyal MD;  Location: [facility]     ENT SURGERY      septal repair     LAPAROSCOPIC EVACUATION ECTOPIC PREGNANCY Left 1/28/2018    Procedure: LAPAROSCOPIC EVACUATION ECTOPIC PREGNANCY;  LAPAROSCOPIC SINGLE SITE EVACUATION OF ECTOPIC PREGNANCY, LEFT SALPINGECTOMY;  Surgeon: Erum Boles MD;  Location:  OR     LAPAROSCOPIC SALPINGECTOMY Left 1/28/2018    Procedure: LAPAROSCOPIC SALPINGECTOMY;;  Surgeon: Erum Boles MD;  Location:  OR     LAPAROSCOPIC SALPINGO-OOPHORECTOMY  11/2/2013    Procedure: LAPAROSCOPIC SALPINGO-OOPHORECTOMY;  Single port laparoscopic, Evacuation of hemo-peritonium and Products of Conception;  Surgeon: Peggy Thornton MD;  Location:  OR     NOSE SURGERY      age 12       CURRENT MEDICATIONS:                  Current Outpatient Medications   Medication Sig Dispense Refill     levothyroxine (SYNTHROID/LEVOTHROID) 112 MCG tablet TAKE 2 TABLETS(224 MCG) BY MOUTH DAILY 120 tablet 0     UNABLE TO FIND MEDICATION NAME: IUD       warfarin ANTICOAGULANT (COUMADIN) 5 MG tablet TAKE 1.5- 2 TABLETS BY MOUTH DAILY. 1.5 TABLETS TWICE PER WEEK, 2 TABLETS FIVE TIMES PER WEEK 150 tablet 3       ALLERGIES:                No Known Allergies    SOCIAL HISTORY:                   Social History     Socioeconomic History     Marital status:      Spouse name: Not on file     Number of children: Not on file     Years of education: Not on file     Highest education level: Not on file   Occupational History     Not on file   Tobacco Use     Smoking status: Never Smoker     Smokeless tobacco: Never Used     Tobacco comment:  on occasion   Substance and Sexual Activity     Alcohol use: No     Alcohol/week: 0.0 standard drinks     Comment: rare     Drug use: No     Sexual activity: Yes     Partners: Male   Other Topics Concern     Not on file   Social History Narrative     Not on file     Social Determinants of Health     Financial Resource Strain:      Difficulty of Paying Living Expenses:    Food Insecurity:      Worried About Running Out of Food in the Last Year:      Ran Out of Food in the Last Year:    Transportation Needs:      Lack of Transportation (Medical):      Lack of Transportation (Non-Medical):    Physical Activity:      Days of Exercise per Week:      Minutes of Exercise per Session:    Stress:      Feeling of Stress :    Social Connections:      Frequency of Communication with Friends and Family:      Frequency of Social Gatherings with Friends and Family:      Attends Jew Services:      Active Member of Clubs or Organizations:      Attends Club or Organization Meetings:      Marital Status:    Intimate Partner Violence:      Fear of Current or Ex-Partner:      Emotionally Abused:      Physically Abused:      Sexually Abused:        FAMILY HISTORY:                 No family history on file.      Physical exam Reveals:    O/P: WNL  HEENT: WNL  NECK: No JVD, thyromegaly, or lymphadenopathy  HEART: RRR, no murmurs, gallops, or rubs  LUNGS: CTA bilaterally without rales, wheezes, or rhonchi  GI: NABS, nondistended, nontender, soft  EXT:without cyanosis, clubbing, or edema  NEURO: nonfocal  : no flank tenderness        MR VENOGRAM OF THE HEAD WITHOUT  AND WITH CONTRAST  10/4/2021 9:50 AM      HISTORY: Cerebral venous thrombosis of cortical vein.       TECHNIQUE: 2D TOF and 2D phase contrast MR venogram without contrast  material. 3D TOF MR venogram with 12 mL Gadavist.     COMPARISON: MRV 12/9/2020, 10/18/2020.     FINDINGS: Improved patency of the superior sagittal sinus, right  transverse sinus, sigmoid sinus, and jugular bulb. A few wispy areas  of hypointensity are present within the posterior superior sagittal  sinus, right transverse sinus, and right sigmoid sinus, less  conspicuous compared to the prior exam, probably representing chronic  septa related to recanalization. Dural venous sinuses are otherwise  widely patent. Cortical veins are without appreciable abnormality.                                                                      IMPRESSION: Continued improvement in patency of the superior sagittal  sinus, right transverse sinus, and right sigmoid sinus.     RITO VELA MD         Examination: 12/10/2020 8:13 AM  MRI of the brain without and with intravenous contrast  MR venography without and with intravenous contrast     HISTORY: Venous sinus thrombosis, leptomeningeal enhancement.     COMPARISON: MRI/MRV 12/9/2020. CT venogram 10/17/2020.     TECHNIQUE: Multiplanar multisequence MRI of the brain without and with  intravenous contrast. Noncontrast two-dimensional time-of-flight and  three-dimensional contrast-enhanced MR venography were also performed.     FINDINGS: Since prior study, there has been improvement in the patency  of the previously thrombosed superior sagittal sinus, right transverse  sinus, sigmoid sinus, and particularly upper right internal jugular  vein. However, there is new stenosis of the upper right internal  jugular vein just inferior to the skull base. Dilated optic nerve  sheaths persist, with continued flattening of the posterior globes,  consistent with persistent increased intracranial pressure. No  intracranial  mass, hydrocephalus, or restricted diffusion.                                                                      Impression: Improved patency of the superior sagittal, right  transverse, and right sigmoid sinuses, with persistent partially  occlusive thrombus. Persistent optic nerve sheath dilation and  posterior globes flattening suggesting continued high intracranial  pressure.     PAOLO FERNANDEZ MD     Component      Latest Ref Rng & Units 11/27/2020   Complement Activity Total Turbidimetric      38.7 - 89.9 U/mL 75.3   Complement C4      13 - 39 mg/dL 25   Complement C3      81 - 157 mg/dL 127   Copath Report       Patient Name: WALTER HAYWARD . . .   D-Dimer      0.0 - 0.50 ug/ml FEU 0.3   INR      2 - 3        Component      Latest Ref Rng & Units 11/30/2020   Complement Activity Total Turbidimetric      38.7 - 89.9 U/mL     Complement C4      13 - 39 mg/dL     Complement C3      81 - 157 mg/dL     Copath Report           D-Dimer      0.0 - 0.50 ug/ml FEU     INR      2 - 3 2.7         Patient Name: WALTER HAYWARD   MR#: [id number]   Specimen #: [id number]   Collected: 11/27/2020 10:16   Received: 11/27/2020 12:46   Reported: 11/30/2020 14:32   Ordering Phy(s): ANSHU SOLIS     For improved result formatting, select 'View Enhanced Report Format' under    Linked Documents section.   _________________________________________     TEST(S) REQUESTED:   Methylene Tetrahydrofolate Reductase Molecular Analysis     SPECIMEN DESCRIPTION:   Blood     RESULTS:     MTHFR (5,10-methylenetetrahydrofolate reductase) Gene:     MTHFR Gene C677T RESULTS: HETEROZYGOTE     INTERPRETATION:   The patient is heterozygous for the C677T mutation (thermolabile form) in   the MTHFR   (5,10-methylenetetrahydrofolate reductase) gene. Heterozygosity for MTHFR   does not predispose patients to   hyperhomocysteinemia. Patients with heterozygous or homozygous mutations   for the MTHFR C677T mutation may have   more toxicity with  methotrexate therapy and children undergoing treatment   for ALL with thrombotic risk   (including these mutations)may have a higher risk of thrombosis.     For genetic counseling inquiries please contact Otilio Tyson at   [phone].     METHODOLOGY: The region of genomic DNA was isolated containing the C677T   mutation (thermolabile form) of the   MTHFR gene and amplified using the polymerase chain reaction. The   amplified products were digested with   restriction endonuclease Hinf I and products were analyzed by gel   electrophoresis.     COMMENTS:   If a patient is the recipient of an allogeneic bone marrow transplant,   this test must be done on a   pre-transplant sample or buccal swab.  A previous allogeneic bone marrow   transplant will interfere with test   results.  Call the [facility]([phone]) for   instructions on sample collection for these   patients.     This test was developed and its performance characteristics determined by   [facility]. It has not been cleared or approved by the FDA.   The laboratory is regulated under CLIA   as qualified to perform high-complexity testing. This test is used for   clinical purposes. It should not be   regarded as investigational or for research.     A resident/fellow in an accredited training program was involved in the   selection of testing, review of   laboratory data, and/or interpretation of this case.  I, as the senior   physician, attest that I: (i) confirmed   appropriate testing, (ii) examined the relevant raw data for the   specimen(s); and (iii) rendered or confirmed   the interpretation(s).     Electronically Signed Out By:   RIGO Hollingsworth     CPT Codes:   A: 10199-FDDPJGF, -USMRVA     TESTING LAB LOCATION:   [address] [address] [phone]      A/P:     (G08) Cerebral  venous thrombosis of cortical vein  (primary encounter diagnosis)  Comment: She is ACd with warfarin and is tolerating this w/o bleeding or bruising. She is a prothrombin and MTHFR coheterozygote. Vitamin supplementation has not been shown to reduce recurrent VTE. She was also previously recently pregnant. She has a Mirena IUD  Plan: I would favor at least 12 months AC with warfarin. I would not switch her to Xarelto. I would check a d dimer and repeat MRV in October, 2021, and see her back one week later. I would favor longer term AC in this individual beyond one year if she has any other intervening clinical thrombotic difficulties.         A/P:    (G08) Cerebral venous thrombosis of cortical vein  (primary encounter diagnosis)  Comment: She has been ACd for over 12 months. She is doing well clinically on the above. No bruising, no bleeding.   Plan: Stop AC. Check d dimer in one month. If has recurrence, or if d dimer markedly increases, resume AC.

## 2021-11-02 NOTE — PROGRESS NOTES
"[facility]        Patient is here for a follow up  to discuss about lab results    /83 (BP Location: Right arm, Patient Position: Chair, Cuff Size: Adult Large)   Pulse 69   Resp 16   Ht 5' 4\" (1.626 m)   Wt 262 lb (118.8 kg)   SpO2 96%   BMI 44.97 kg/m      The provider has been notified that the patient has no concerns.     Questions patient would like addressed today are: N/A.    Refills are needed: No    Has homecare services and agency name:  [facility]      "

## 2021-11-03 ENCOUNTER — TELEPHONE (OUTPATIENT)
Dept: OTHER | Facility: CLINIC | Age: 41
End: 2021-11-03

## 2021-11-03 ENCOUNTER — OFFICE VISIT (OUTPATIENT)
Dept: NEUROLOGY | Facility: CLINIC | Age: 41
End: 2021-11-03
Payer: COMMERCIAL

## 2021-11-03 VITALS
SYSTOLIC BLOOD PRESSURE: 120 MMHG | DIASTOLIC BLOOD PRESSURE: 73 MMHG | WEIGHT: 262 LBS | HEART RATE: 68 BPM | BODY MASS INDEX: 44.97 KG/M2 | OXYGEN SATURATION: 99 % | RESPIRATION RATE: 16 BRPM

## 2021-11-03 DIAGNOSIS — G08 CEREBRAL VENOUS THROMBOSIS OF CORTICAL VEIN: Primary | ICD-10-CM

## 2021-11-03 PROCEDURE — 99213 OFFICE O/P EST LOW 20 MIN: CPT | Performed by: PSYCHIATRY & NEUROLOGY

## 2021-11-03 NOTE — TELEPHONE ENCOUNTER
Follow up to 10/2/21    Patient needs a d-dimer in one month.  Forwarding to Dr. Dickson to determine if follow up with Merlyn Nye PA-C would be appropriate.    Yamel Morin RN BSN  [facility]  [phone]

## 2021-11-03 NOTE — PROGRESS NOTES
25 minutes spent on the date of the encounter doing chart review and review of outside records and documentation and patient visits         No follow-ups on file.    Ghada Earl MD  [facility] NEUROLOGY CLINIC [place]          __________________________________________________________      [facility]    at [facility] [place] [facility] [place]  __________________________________________________________    Chief Complaint: Patient presents with:  RECHECK: [facility] RETURN STROKE - 3 mo. f/u      History of Present Illness: Paulina Diana is a 41 year old female presenting for follow-up for CVST.     According to the patient she presented to hospital after experiencing sudden onset R temple severe throbbing headache with nausea vomiting photophobia and phonophobia. She slept for about 12 hours and woke up noticing R arm numbness from hand till elbow and she was dropping things this prompted her to contact her doctor who advised her to visit ER. While in ER she was found to have CVST.  She was hospitalized from 10/17/2020 till 10/21/2020. She was transferred to [facility] for further evaluation. CT head showed hyperdensities suggestive of sinus vein thrombosis, CTV revealed SSS, R transversae sinus, sigmoid sinuse and upper jugular thrombosis and questionable right veinous infarct.  CTP perfusion abnormalities did not correspond with arterial territories. Found to have CVST 2/2 prothrombotic state due to recent ectopic pregnancy for which she was given methotrexate. She was treated with heparin gtt charles intensity treatment, eventually transitioned warfarin with Lovenox bridge.      She reports two weeks prior to headache she had severe abdominal pain and was found to have ectopic pregnancy She was given two doses of Methotrexate. She was 7 to 8 weeks in pregnancy.     She is doing good. No headache. She reports bilateral hand and feet numbness and that that  everything takes like a lemon. She is compliant with Coumadin with a stable INR.She was diagnosed with COVID on 8Th of Nov and had mild symptoms with just sinus infection and cough. She has recovered and doing well.    She denies any episodes of weakness numbness tingling loss of vision blurry vision double vision loss of balance changes in chewing swallowing or speech that lasted for 24 hours and resolved. She also denies hx of recurrent sinus infections, lung infections.    Currently she denies any new headaches or recurrence of headache. She denies any visual obscurations and or pulsatile tinnitus. She denies any loss of vision blurry vision or changes with vision. She is seeing vascular medicine. Plan is to repeat MRV in one year around Oct 2021 and till then she will continue to take anticoagulation. She reports memory issues since January. Memory issues has been stable and has not got worsened. As per patient few events where she has noticed memory issues includes missing details at work and recalling it later. She may drive and miss her exits and then realize it later. She denies any difficulty with any ADLs or IADLS.     Work-up as stated below under Pertinent Investigations     Hypercoagulable workup as follows:   - Protein C  - Protein S  - Cardiolipin  - Antithrombin III  - Factor 2 and 5 mutation  - Factor 5 assay  - Factor 2 assay elevated  - Beta 2 glycoprotein  - Lupus anticoagulant  - Cardiolipin Ab negative  - Homocysteine elevated to 13.2  -MTHFR : heterozygous for the C677T mutation (thermolabile form) in   the MTHFR (5,10-methylenetetrahydrofolate reductase) gene     PIEDAD, SSA, SSB, DsDNA, ACE, RNP, anti Smith were  Negative (not elevated)    - CSF labs for leptomeningeal enhancement:  - Cell count + diff  - total protein  - glucose  - aerobic culture  - lyme disease negative  - HSV negative   - ACE slightly elevated to 2.7  - Oligoclonal Banding CSF negative  - EBV PCR CSF negative  - ACE CSF  elevated 2.7  - Cytology CSF There is an increased population of small cells morphologically consistent  with lymphocytes. While there are  no definitive features of hematolymphoid disorder, correlation with  associated flow cytometry ([id number]) is  recommended. Lymphocytosis maybe associated with infection or other  disorders other than hematolymphoid  diseases.  - Immuno: The total number of cells is low limiting the number of antibodies that  can be analyzed and limiting the   interpretation. Only B-cell antigens were evaluated. There is no immunophenotypic evidence of B-cell   non-Hodgkin lymphoma      CTV 10/17/2020: There is thrombosis within the dural venous sinuses at the superior sagittal sinus, right transverse sinus, sigmoid sinus, and upper jugular level.    MRV 10/18/2020: 1, No diffusion restriction to suggest venous infarct. Scattered leptomeningeal enhancement and associated diffusion restriction throughout the bilateral cerebral hemispheres, likely represents cortical venous congestion.   2. Total occlusion of the superior sagittal sinus, right transverse sinus, right sigmoid sinus, and proximal part of the right internal jugular vein.     MRV / MRI 12/9/2020: Improved patency of the superior sagittal, right transverse, and right sigmoid sinuses, with persistent partially occlusive thrombus. Persistent optic nerve sheath dilation and posterior globes flattening suggesting continued high intracranial pressure.    [facility] Eye exam: 1/25/2021: OCT and visual field within normal range in both eyes.     MRV 10/4/2021:Continued improvement in patency of the superior sagittal sinus, right transverse sinus, and right sigmoid sinus.    She was seen yesterday by Dr Dickson and he recommended to stop coumadin. She is doing well otherwise and reports memory is better now.  For her memory concerns B12 folate was normal. She reports improvement in her memory.       Impression:   Problem List Items Addressed This  "Visit        Circulatory    Cerebral venous thrombosis of cortical vein - Primary          41 year old female with no significant past medical hx presented to hospital in October for new onset headache and R arm numbness and clumsiness found to have extensive CVST for which she required hospitalization. Prior to this event about 2 weeks she was diagnosed with ectopic pregnancy and was given Methotrexate for treatment. Work up during hospitalization showed extensive CVST confirmed on MR imaging plus leptomeningeal enhancement. Follow up testing for hypercoagulable state showed increased homocysteine level and heterozygous MTHFR mutation. There are reports to suggests excessive methotrexate toxicity in patient with this mutations and elevated risk of thrombosis in children with ALL.Further work for leptomeningeal enhancement is detailed above and essentially was negative. In report of yesterdays MRI there was no mention of leptomeningeal enhancement mentioned. She denies any red flags of history of headache and or focal neurological deficits. Follow up MRI in Oct 2021 showed improvement. She was seen yesterday in vascular medicine clinic and they recommended to stop coumadin.         Plan:   - Agree with discontinuation of coumadin  - Follow up as needed    She will call us with any questions.  For any acute neurologic deficits she was advised to  go directly to the hospital rather than call the clinic.    Ghada Earl MD  Neurology  11/03/2021 10:30 AM  To page me or covering stroke neurology team member, click here: AMCOM  Choose \"On Call\" tab at top, then search dropdown box for \"Neurology Adult\" & press Enter, look for Neuro ICU/Stroke    ___________________________________________________________________    Current Medications  Current Outpatient Medications   Medication Sig     levothyroxine (SYNTHROID/LEVOTHROID) 112 MCG tablet TAKE 2 TABLETS(224 MCG) BY MOUTH DAILY     UNABLE TO FIND MEDICATION NAME: IUD "     warfarin ANTICOAGULANT (COUMADIN) 5 MG tablet TAKE 1.5- 2 TABLETS BY MOUTH DAILY. 1.5 TABLETS TWICE PER WEEK, 2 TABLETS FIVE TIMES PER WEEK     No current facility-administered medications for this visit.       Past Medical History  Past Medical History:   Diagnosis Date     Ectopic pregnancy, tubal 1/28/2018     Thyroid disease     hypothyroid       Social History  Social History     Tobacco Use     Smoking status: Never Smoker     Smokeless tobacco: Never Used     Tobacco comment:  on occasion   Substance Use Topics     Alcohol use: No     Alcohol/week: 0.0 standard drinks     Comment: rare     Drug use: No       Family History  No family history on file.    ROS: 10 point relevant ROS neg other than the symptoms noted above in the HPI.    Physical Exam    /73   Pulse 68   Resp 16   Wt 118.8 kg (262 lb)   LMP 10/20/2021   SpO2 99%   BMI 44.97 kg/m      General:  no acute distress  HEENT:  normocephalic/atraumatic  Pulmonary:  no respiratory distress    Neurologic    MOCA 28/30  Mental Status:  alert, oriented x 3, follows commands, speech clear and fluent, naming and repetition normal  Cranial Nerves:  EOMI with normal smooth pursuit, facial movements symmetric, hearing not formally tested but intact to conversation, no dysarthria, shoulder shrug equal bilaterally, tongue protrusion midline  Motor:  no abnormal movements, able to move all limbs antigravity spontaneously with no signs of hemiparesis observed, no pronator drift  Reflexes:  Intact  Sensory:  Inatct  Coordination:  normal finger-to-nose and heel-to-shin bilaterally without dysmetria, rapid alternating movements symmetric  Station/Gait:  Normal    Neuroimaging: as per HPI. I personally reviewed those images and it showed extensive CVST on initial MRI which has improved. Leptomeningeal enhancement on initial MRI , on follow up MRI there is not much enhancement appreciated.    Labs:    Recent Labs   Lab Test 10/14/21  0802  09/10/21  1110 08/20/21  0000   INR 2.1 2.5 2.4        Recent Labs   Lab Test 10/18/20  0024 12/12/19  1559   CHOL 163 185   HDL 53 62   LDL 78 111*   TRIG 165* 61       No lab results found.    Recent Labs   Lab Test 10/18/20  0024 10/17/20  1752   TROPI <0.015 <0.015       Answers for HPI/ROS submitted by the patient on 11/2/2021  General Symptoms: No  Skin Symptoms: No  HENT Symptoms: No  EYE SYMPTOMS: No  HEART SYMPTOMS: No  LUNG SYMPTOMS: No  INTESTINAL SYMPTOMS: Yes  URINARY SYMPTOMS: No  GYNECOLOGIC SYMPTOMS: No  BREAST SYMPTOMS: No  SKELETAL SYMPTOMS: No  BLOOD SYMPTOMS: Yes  NERVOUS SYSTEM SYMPTOMS: No  MENTAL HEALTH SYMPTOMS: No  Heart burn or indigestion: Yes  Nausea: No  Vomiting: No  Abdominal pain: No  Bloating: No  Constipation: Yes  Diarrhea: No  Blood in stool: No  Black stools: No  Rectal or Anal pain: No  Fecal incontinence: No  Yellowing of skin or eyes: No  Vomit with blood: No  Change in stools: No  Anemia: No  Easy bleeding or bruising: Yes  Edema or swelling: No

## 2021-11-03 NOTE — NURSING NOTE
Chief Complaint   Patient presents with     RECHECK     [facility] RETURN STROKE - 3 mo. f/u     Ethan Ashraf

## 2021-11-03 NOTE — TELEPHONE ENCOUNTER
Follow up to 11/2/21    Please arrange for:      D-dimer (non-fasting lab)  Around 12/2/21    In clinic visit with Merlyn Nye PA-C 2-3 days later.    Yamel Morin RN BSN  [facility]  [phone]

## 2021-11-03 NOTE — LETTER
11/3/2021       RE: Paulina Diana  [id number]  [place] [address]     Dear Colleague,    Thank you for referring your patient, Paulina Diana, to the [facility] NEUROLOGY CLINIC [place] at [facility]. Please see a copy of my visit note below.          25 minutes spent on the date of the encounter doing chart review and review of outside records and documentation and patient visits         No follow-ups on file.    Ghada Earl MD  [facility] NEUROLOGY CLINIC [place]          __________________________________________________________      [facility]    at [facility]  __________________________________________________________    Chief Complaint: Patient presents with:  RECHECK: [facility] RETURN STROKE - 3 mo. f/u      History of Present Illness: Paulina Diana is a 41 year old female presenting for follow-up for CVST.     According to the patient she presented to hospital after experiencing sudden onset R temple severe throbbing headache with nausea vomiting photophobia and phonophobia. She slept for about 12 hours and woke up noticing R arm numbness from hand till elbow and she was dropping things this prompted her to contact her doctor who advised her to visit ER. While in ER she was found to have CVST.  She was hospitalized from 10/17/2020 till 10/21/2020. She was transferred to [facility] for further evaluation. CT head showed hyperdensities suggestive of sinus vein thrombosis, CTV revealed SSS, R transversae sinus, sigmoid sinuse and upper jugular thrombosis and questionable right veinous infarct.  CTP perfusion abnormalities did not correspond with arterial territories. Found to have CVST 2/2 prothrombotic state due to recent ectopic pregnancy for which she was given methotrexate. She was treated with heparin gtt charles intensity treatment, eventually transitioned warfarin with  Lovenox bridge.      She reports two weeks prior to headache she had severe abdominal pain and was found to have ectopic pregnancy She was given two doses of Methotrexate. She was 7 to 8 weeks in pregnancy.     She is doing good. No headache. She reports bilateral hand and feet numbness and that that everything takes like a lemon. She is compliant with Coumadin with a stable INR.She was diagnosed with COVID on 8Th of Nov and had mild symptoms with just sinus infection and cough. She has recovered and doing well.    She denies any episodes of weakness numbness tingling loss of vision blurry vision double vision loss of balance changes in chewing swallowing or speech that lasted for 24 hours and resolved. She also denies hx of recurrent sinus infections, lung infections.    Currently she denies any new headaches or recurrence of headache. She denies any visual obscurations and or pulsatile tinnitus. She denies any loss of vision blurry vision or changes with vision. She is seeing vascular medicine. Plan is to repeat MRV in one year around Oct 2021 and till then she will continue to take anticoagulation. She reports memory issues since January. Memory issues has been stable and has not got worsened. As per patient few events where she has noticed memory issues includes missing details at work and recalling it later. She may drive and miss her exits and then realize it later. She denies any difficulty with any ADLs or IADLS.     Work-up as stated below under Pertinent Investigations     Hypercoagulable workup as follows:   - Protein C  - Protein S  - Cardiolipin  - Antithrombin III  - Factor 2 and 5 mutation  - Factor 5 assay  - Factor 2 assay elevated  - Beta 2 glycoprotein  - Lupus anticoagulant  - Cardiolipin Ab negative  - Homocysteine elevated to 13.2  -MTHFR : heterozygous for the C677T mutation (thermolabile form) in   the MTHFR (5,10-methylenetetrahydrofolate reductase) gene     PIEDAD, SSA, SSB, DsDNA, ACE, RNP,  anti Lindquist were  Negative (not elevated)    - CSF labs for leptomeningeal enhancement:  - Cell count + diff  - total protein  - glucose  - aerobic culture  - lyme disease negative  - HSV negative   - ACE slightly elevated to 2.7  - Oligoclonal Banding CSF negative  - EBV PCR CSF negative  - ACE CSF elevated 2.7  - Cytology CSF There is an increased population of small cells morphologically consistent  with lymphocytes. While there are  no definitive features of hematolymphoid disorder, correlation with  associated flow cytometry ([id number]) is  recommended. Lymphocytosis maybe associated with infection or other  disorders other than hematolymphoid  diseases.  - Immuno: The total number of cells is low limiting the number of antibodies that  can be analyzed and limiting the   interpretation. Only B-cell antigens were evaluated. There is no immunophenotypic evidence of B-cell   non-Hodgkin lymphoma      CTV 10/17/2020: There is thrombosis within the dural venous sinuses at the superior sagittal sinus, right transverse sinus, sigmoid sinus, and upper jugular level.    MRV 10/18/2020: 1, No diffusion restriction to suggest venous infarct. Scattered leptomeningeal enhancement and associated diffusion restriction throughout the bilateral cerebral hemispheres, likely represents cortical venous congestion.   2. Total occlusion of the superior sagittal sinus, right transverse sinus, right sigmoid sinus, and proximal part of the right internal jugular vein.     MRV / MRI 12/9/2020: Improved patency of the superior sagittal, right transverse, and right sigmoid sinuses, with persistent partially occlusive thrombus. Persistent optic nerve sheath dilation and posterior globes flattening suggesting continued high intracranial pressure.    [facility] Eye exam: 1/25/2021: OCT and visual field within normal range in both eyes.     MRV 10/4/2021:Continued improvement in patency of the superior sagittal sinus, right transverse sinus,  "and right sigmoid sinus.    She was seen yesterday by Dr Dickson and he recommended to stop coumadin. She is doing well otherwise and reports memory is better now.  For her memory concerns B12 folate was normal. She reports improvement in her memory.       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 Prior to this event about 2 weeks she was diagnosed with ectopic pregnancy and was given Methotrexate for treatment. Work up during hospitalization showed extensive CVST confirmed on MR imaging plus leptomeningeal enhancement. Follow up testing for hypercoagulable state showed increased homocysteine level and heterozygous MTHFR mutation. There are reports to suggests excessive methotrexate toxicity in patient with this mutations and elevated risk of thrombosis in children with ALL.Further work for leptomeningeal enhancement is detailed above and essentially was negative. In report of yesterdays MRI there was no mention of leptomeningeal enhancement mentioned. She denies any red flags of history of headache and or focal neurological deficits. Follow up MRI in Oct 2021 showed improvement. She was seen yesterday in vascular medicine clinic and they recommended to stop coumadin.         Plan:   - Agree with discontinuation of coumadin  - Follow up as needed    She will call us with any questions.  For any acute neurologic deficits she was advised to  go directly to the hospital rather than call the clinic.    Ghada Earl MD  Neurology  11/03/2021 10:30 AM  To page me or covering stroke neurology team member, click here: AMCOM  Choose \"On Call\" tab at top, then search dropdown box for \"Neurology Adult\" & press Enter, " look for Neuro ICU/Stroke    ___________________________________________________________________    Current Medications  Current Outpatient Medications   Medication Sig     levothyroxine (SYNTHROID/LEVOTHROID) 112 MCG tablet TAKE 2 TABLETS(224 MCG) BY MOUTH DAILY     UNABLE TO FIND MEDICATION NAME: IUD     warfarin ANTICOAGULANT (COUMADIN) 5 MG tablet TAKE 1.5- 2 TABLETS BY MOUTH DAILY. 1.5 TABLETS TWICE PER WEEK, 2 TABLETS FIVE TIMES PER WEEK     No current facility-administered medications for this visit.       Past Medical History  Past Medical History:   Diagnosis Date     Ectopic pregnancy, tubal 1/28/2018     Thyroid disease     hypothyroid       Social History  Social History     Tobacco Use     Smoking status: Never Smoker     Smokeless tobacco: Never Used     Tobacco comment:  on occasion   Substance Use Topics     Alcohol use: No     Alcohol/week: 0.0 standard drinks     Comment: rare     Drug use: No       Family History  No family history on file.    ROS: 10 point relevant ROS neg other than the symptoms noted above in the HPI.    Physical Exam    /73   Pulse 68   Resp 16   Wt 118.8 kg (262 lb)   LMP 10/20/2021   SpO2 99%   BMI 44.97 kg/m      General:  no acute distress  HEENT:  normocephalic/atraumatic  Pulmonary:  no respiratory distress    Neurologic    MOCA 28/30  Mental Status:  alert, oriented x 3, follows commands, speech clear and fluent, naming and repetition normal  Cranial Nerves:  EOMI with normal smooth pursuit, facial movements symmetric, hearing not formally tested but intact to conversation, no dysarthria, shoulder shrug equal bilaterally, tongue protrusion midline  Motor:  no abnormal movements, able to move all limbs antigravity spontaneously with no signs of hemiparesis observed, no pronator drift  Reflexes:  Intact  Sensory:  Inatct  Coordination:  normal finger-to-nose and heel-to-shin bilaterally without dysmetria, rapid alternating movements  symmetric  Station/Gait:  Normal    Neuroimaging: as per HPI. I personally reviewed those images and it showed extensive CVST on initial MRI which has improved. Leptomeningeal enhancement on initial MRI , on follow up MRI there is not much enhancement appreciated.    Labs:    Recent Labs   Lab Test 10/14/21  0854 09/10/21  1110 08/20/21  0000   INR 2.1 2.5 2.4        Recent Labs   Lab Test 10/18/20  0024 12/12/19  1559   CHOL 163 185   HDL 53 62   LDL 78 111*   TRIG 165* 61       No lab results found.    Recent Labs   Lab Test 10/18/20  0024 10/17/20  1752   TROPI <0.015 <0.015       Answers for HPI/ROS submitted by the patient on 11/2/2021  General Symptoms: No  Skin Symptoms: No  HENT Symptoms: No  EYE SYMPTOMS: No  HEART SYMPTOMS: No  LUNG SYMPTOMS: No  INTESTINAL SYMPTOMS: Yes  URINARY SYMPTOMS: No  GYNECOLOGIC SYMPTOMS: No  BREAST SYMPTOMS: No  SKELETAL SYMPTOMS: No  BLOOD SYMPTOMS: Yes  NERVOUS SYSTEM SYMPTOMS: No  MENTAL HEALTH SYMPTOMS: No  Heart burn or indigestion: Yes  Nausea: No  Vomiting: No  Abdominal pain: No  Bloating: No  Constipation: Yes  Diarrhea: No  Blood in stool: No  Black stools: No  Rectal or Anal pain: No  Fecal incontinence: No  Yellowing of skin or eyes: No  Vomit with blood: No  Change in stools: No  Anemia: No  Easy bleeding or bruising: Yes  Edema or swelling: No          Again, thank you for allowing me to participate in the care of your patient.      Sincerely,    Ghada Earl MD

## 2021-11-12 NOTE — TELEPHONE ENCOUNTER
2 attempts have been made to schedule appointment with no return calls from patient. No further attempts to be made.     Becca PAK   Routing to nurse DARCY as GILMER

## 2021-12-13 ENCOUNTER — LAB (OUTPATIENT)
Dept: LAB | Facility: CLINIC | Age: 41
End: 2021-12-13
Payer: COMMERCIAL

## 2021-12-13 DIAGNOSIS — G08 CEREBRAL VENOUS THROMBOSIS OF CORTICAL VEIN: ICD-10-CM

## 2021-12-13 LAB — D DIMER PPP FEU-MCNC: <0.27 UG/ML FEU (ref 0–0.5)

## 2021-12-13 PROCEDURE — 85379 FIBRIN DEGRADATION QUANT: CPT

## 2021-12-13 PROCEDURE — 36415 COLL VENOUS BLD VENIPUNCTURE: CPT

## 2022-01-18 ENCOUNTER — TELEPHONE (OUTPATIENT)
Dept: OTHER | Facility: CLINIC | Age: 42
End: 2022-01-18
Payer: COMMERCIAL

## 2022-01-18 NOTE — TELEPHONE ENCOUNTER
LM for patient that she is good to go for her appt with SRINIVAS Graff. No blood work needed before the appt.

## 2022-01-18 NOTE — TELEPHONE ENCOUNTER
Does she need to complete another D-dimer lab prior to her visit with Merlyn Nye on 1/25/22? Last d-dimer was completed on 12/13/21 (see was never seen to discuss those results).     Please advise.      Denise Jaquez    [facility]   [phone]

## 2022-01-18 NOTE — TELEPHONE ENCOUNTER
Patient does not need another d-dimer prior to seeing Merlyn Nye PA-C.    Yamel Morin RN BSN  [facility]  [phone]

## 2022-01-25 ENCOUNTER — OFFICE VISIT (OUTPATIENT)
Dept: OTHER | Facility: CLINIC | Age: 42
End: 2022-01-25
Attending: INTERNAL MEDICINE
Payer: COMMERCIAL

## 2022-01-25 VITALS
OXYGEN SATURATION: 98 % | HEART RATE: 83 BPM | DIASTOLIC BLOOD PRESSURE: 79 MMHG | BODY MASS INDEX: 46.17 KG/M2 | SYSTOLIC BLOOD PRESSURE: 115 MMHG | WEIGHT: 269 LBS

## 2022-01-25 DIAGNOSIS — G08 CEREBRAL VENOUS THROMBOSIS OF CORTICAL VEIN: ICD-10-CM

## 2022-01-25 PROCEDURE — 99213 OFFICE O/P EST LOW 20 MIN: CPT | Performed by: PHYSICIAN ASSISTANT

## 2022-01-25 PROCEDURE — G0463 HOSPITAL OUTPT CLINIC VISIT: HCPCS

## 2022-01-25 NOTE — PROGRESS NOTES
[facility]        Patient is here for a  follow up.     Pt is currently taking no meds that would impact our treatment plan.    /79 (BP Location: Right arm, Patient Position: Chair, Cuff Size: Adult Large)   Pulse 83   Wt 269 lb (122 kg)   SpO2 98%   Breastfeeding No   BMI 46.17 kg/m      The provider has been notified that the patient has no concerns.     Questions patient would like addressed today are: N/A.    Refills are needed: N/A    Has homecare services and agency name:  Teri Pitts MA

## 2022-01-25 NOTE — PROGRESS NOTES
[facility] CENTER  VASCULAR MEDICINE FOLLOW-UP VISIT      PRIMARY HEALTH CARE PROVIDER:  Ismael Ribera    REASON FOR VISIT:  Follow-up d.dimer results.       HPI: Paulina Diana is a very pleasant 41 year old  [id number] female admitted 8/10/20 to [facility] for presumed ectopic cornu pregnancy S/P IM MTX w/ resolution of ectopic w/o prior h/o VTE who then presented to R 10/17/2020 with persistent headache x 3 days that woke up her from sleep and was found to have CVST. Symptoms included photophobia, nausea and vomiting. In addition she had numbness and clumsiness of right hand. She was transferred to [facility] for further evaluation. CT head showed hyperdensities suggestive of sinus vein thrombosis, CTV revealed SSS, R transversae sinus, sigmoid sinus and upper jugular thrombosis and questionable right venous infarct.  CT perfusion abnormalities did not correspond with arterial territories. Found to have CVST 2/2 prothrombotic state due to recent ectopic pregnancy. She was treated with heparin gtt high intensity treatment, and eventually transitioned to warfarin with Lovenox bridge.  Also found incidentally during workup was leptomeningeal enhancement on MRI Brain, being worked up by neurology.     She is a prothrombin heterozygote with elevated homocysteine.     MRV in 10/2021 has documented recanalization of the CVSTs, and her MTHFR mutation was noted to be normal. Complement levels drawn due to ANCA elevation were normal. She has no residual neurologic sxs, and no headaches.      She had completed over 12 months of anticoagulation. Dr. Dickson discontinued her warfarin in 2021. She now returns to discuss her follow-up d.dimer results. She is presently doing well.       PAST MEDICAL HISTORY  Past Medical History:   Diagnosis Date     Ectopic pregnancy, tubal 2018     Thyroid disease     hypothyroid       PAST SURGICAL HISTORY:  Past Surgical History:   Procedure Laterality Date     DILATION AND  CURETTAGE SUCTION N/A 5/27/2016    Procedure: DILATION AND CURETTAGE SUCTION;  Surgeon: Natacha Goyal MD;  Location: [facility]     ENT SURGERY      septal repair     LAPAROSCOPIC EVACUATION ECTOPIC PREGNANCY Left 1/28/2018    Procedure: LAPAROSCOPIC EVACUATION ECTOPIC PREGNANCY;  LAPAROSCOPIC SINGLE SITE EVACUATION OF ECTOPIC PREGNANCY, LEFT SALPINGECTOMY;  Surgeon: Erum Boles MD;  Location:  OR     LAPAROSCOPIC SALPINGECTOMY Left 1/28/2018    Procedure: LAPAROSCOPIC SALPINGECTOMY;;  Surgeon: Erum Boles MD;  Location:  OR     LAPAROSCOPIC SALPINGO-OOPHORECTOMY  11/2/2013    Procedure: LAPAROSCOPIC SALPINGO-OOPHORECTOMY;  Single port laparoscopic, Evacuation of hemo-peritonium and Products of Conception;  Surgeon: Peggy Thornton MD;  Location:  OR     NOSE SURGERY      age 12         CURRENT MEDICATIONS  levothyroxine (SYNTHROID/LEVOTHROID) 112 MCG tablet, TAKE 2 TABLETS(224 MCG) BY MOUTH DAILY  UNABLE TO FIND, MEDICATION NAME: IUD    No current facility-administered medications on file prior to visit.      ALLERGIES   No Known Allergies    FAMILY HISTORY  No family history on file.    SOCIAL HISTORY  Social History     Socioeconomic History     Marital status:      Spouse name: Not on file     Number of children: Not on file     Years of education: Not on file     Highest education level: Not on file   Occupational History     Not on file   Tobacco Use     Smoking status: Never Smoker     Smokeless tobacco: Never Used     Tobacco comment:  on occasion   Substance and Sexual Activity     Alcohol use: No     Alcohol/week: 0.0 standard drinks     Comment: rare     Drug use: No     Sexual activity: Yes     Partners: Male       ROS:   Complete ROS negative other than what is stated in HPI.     EXAM:  /79 (BP Location: Right arm, Patient Position: Chair, Cuff Size: Adult Large)   Pulse 83   Wt 122 kg (269 lb)   SpO2 98%   Breastfeeding No   BMI 46.17 kg/m     In general, the patient is a pleasant female in no apparent distress.    HEENT: NC/AT.  PERRLA.  EOMI.  Sclerae white, not injected.    Neck: No adenopathy.  Carotids +2/2 bilaterally without bruits.   Heart: RRR. Normal S1, S2 splits physiologically. No murmur, rub, click, or gallop.   Lungs: CTA.  No ronchi, wheezes, rales.    Abdomen: Soft, nontender, nondistended.   Extremities: No clubbing, cyanosis, or edema. No wounds.     Labs:    Component      Latest Ref Rng & Units 11/27/2020 9/27/2021 12/13/2021   D-Dimer      0.0 - 0.50 ug/ml FEU 0.3     D-Dimer Quantitative      0.00 - 0.50 ug/mL FEU  <0.27 <0.27       Procedures:   MR VENOGRAM OF THE HEAD WITHOUT AND WITH CONTRAST  10/4/2021 9:50 AM      HISTORY: Cerebral venous thrombosis of cortical vein.       TECHNIQUE: 2D TOF and 2D phase contrast MR venogram without contrast  material. 3D TOF MR venogram with 12 mL Gadavist.     COMPARISON: MRV 12/9/2020, 10/18/2020.     FINDINGS: Improved patency of the superior sagittal sinus, right  transverse sinus, sigmoid sinus, and jugular bulb. A few wispy areas  of hypointensity are present within the posterior superior sagittal  sinus, right transverse sinus, and right sigmoid sinus, less  conspicuous compared to the prior exam, probably representing chronic  septa related to recanalization. Dural venous sinuses are otherwise  widely patent. Cortical veins are without appreciable abnormality.                                                                      IMPRESSION: Continued improvement in patency of the superior sagittal  sinus, right transverse sinus, and right sigmoid sinus.    Assessment and Plan:   (G08) Cerebral venous thrombosis of cortical vein  (primary encounter diagnosis)    -MRV in 10/2021 has documented recanalization of the CVSTs, and her MTHFR mutation was noted to be normal. Complement levels drawn due to ANCA elevation were normal. She has no residual neurologic sxs, and no headaches.  -Doing  well after stopping anticoagulation in 11/2021 with normal d.dimer.     Plan:  -Continue off anticoagulation.  -Start aspirin 81 mg daily.   -Follow-up with Vascular Medicine as needed.       Merlyn Nye PA-C      25 minutes spent on the date of the encounter doing chart review, history and exam, documentation, and further activities as noted above.

## 2022-02-07 DIAGNOSIS — E03.9 ACQUIRED HYPOTHYROIDISM: ICD-10-CM

## 2022-02-07 PROCEDURE — 36415 COLL VENOUS BLD VENIPUNCTURE: CPT | Performed by: NURSE PRACTITIONER

## 2022-02-09 ENCOUNTER — MYC MEDICAL ADVICE (OUTPATIENT)
Dept: FAMILY MEDICINE | Facility: CLINIC | Age: 42
End: 2022-02-09

## 2022-02-09 DIAGNOSIS — E03.9 ACQUIRED HYPOTHYROIDISM: ICD-10-CM

## 2022-02-09 LAB — TSH BLD-ACNC: <0.005 UIU/ML (ref 0.45–4.5)

## 2022-02-09 RX ORDER — LEVOTHYROXINE SODIUM 200 UG/1
200 TABLET ORAL DAILY
Qty: 60 TABLET | Refills: 0 | Status: SHIPPED | OUTPATIENT
Start: 2022-02-09 | End: 2022-03-23

## 2022-02-09 NOTE — PROGRESS NOTES
Called Paulina- TSH very low, will decrease levothyroxine to 200mcg daily. Recheck TSH in eight weeks. She verbalized understanding of and agreement with plan.  Mikaela Jackman [facility]

## 2022-02-13 ENCOUNTER — HEALTH MAINTENANCE LETTER (OUTPATIENT)
Age: 42
End: 2022-02-13

## 2022-02-25 ENCOUNTER — TELEPHONE (OUTPATIENT)
Dept: OTHER | Facility: CLINIC | Age: 42
End: 2022-02-25
Payer: COMMERCIAL

## 2022-02-25 NOTE — TELEPHONE ENCOUNTER
Future Appointments   Date Time Provider Department Center   3/10/2022 12:10 PM Maxx Dickson MD [facility]     Called patient and schedule above appointment.  Sooner appointments were offered but patient was unable due to work schedule.    Yamel Morin RN BSN  [facility]  [phone]

## 2022-02-25 NOTE — TELEPHONE ENCOUNTER
Follow up plan from 1/25/22 office visit with Merlyn Nye PA-C:    Plan:  -Continue off anticoagulation.  -Start aspirin 81 mg daily.   -Follow-up with Vascular Medicine as needed.

## 2022-02-25 NOTE — TELEPHONE ENCOUNTER
[facility]    Who is the name of the provider?:  Dr. Dickson      What is the location you see this provider at?: [place]    Can we leave a detailed message on this number? On cell number yes.    Reason for call:  Paulina states when her blood thinner medication was too high her knuckles turned purple. She was switched to low dose Asprin and states that her knuckles are starting to turn purple again.     Please contact to discuss.     If calling before 4:00 pm, please call her at [facility] - [phone].      Denise Jaquez    [facility]   [phone]

## 2022-03-22 ENCOUNTER — MYC MEDICAL ADVICE (OUTPATIENT)
Dept: OTHER | Facility: CLINIC | Age: 42
End: 2022-03-22
Payer: COMMERCIAL

## 2022-03-22 DIAGNOSIS — G08 CEREBRAL VENOUS THROMBOSIS OF CORTICAL VEIN: Primary | ICD-10-CM

## 2022-03-23 RX ORDER — LEVOTHYROXINE SODIUM 200 UG/1
200 TABLET ORAL DAILY
Qty: 60 TABLET | Refills: 0 | Status: SHIPPED | OUTPATIENT
Start: 2022-03-23 | End: 2022-04-01

## 2022-03-23 NOTE — TELEPHONE ENCOUNTER
Patient is to take 200mcg daily of levothyroxine and recheck TSH in 6 weeks. Message left for patient with instructions and plan. Future lab order in [facility]. Xena Pineda

## 2022-03-23 NOTE — TELEPHONE ENCOUNTER
I am not clinically worried about these sxs, but as the patient is concerned about this please arrange an INR check and a f/u visit with me to examine her and discuss furhter within context of said INR results.

## 2022-03-23 NOTE — TELEPHONE ENCOUNTER
Elia contact patient and arrange for:      Non-fasting lab draw (INR)    In clinic visit the next day.    Yamel Morin RN BSN  [facility]  [phone]

## 2022-03-23 NOTE — TELEPHONE ENCOUNTER
Scheduled as follows:     Future Appointments   Date Time Provider Department Center   3/28/2022 11:00 AM [facility] LAB ECLABR [facility]   4/7/2022  3:10 PM Maxx Dickson MD [facility]     This date worked best for her labs. Unable to schedule with Dr. Dickson the day after.     Denise Jaquez    [facility]   [phone]

## 2022-03-31 ENCOUNTER — LAB (OUTPATIENT)
Dept: LAB | Facility: CLINIC | Age: 42
End: 2022-03-31
Payer: COMMERCIAL

## 2022-03-31 DIAGNOSIS — G08 CEREBRAL VENOUS THROMBOSIS OF CORTICAL VEIN: ICD-10-CM

## 2022-03-31 LAB — INR PPP: 1.01 (ref 0.85–1.15)

## 2022-03-31 PROCEDURE — 85610 PROTHROMBIN TIME: CPT

## 2022-03-31 PROCEDURE — 36415 COLL VENOUS BLD VENIPUNCTURE: CPT

## 2022-04-01 DIAGNOSIS — E03.9 ACQUIRED HYPOTHYROIDISM: ICD-10-CM

## 2022-04-01 NOTE — TELEPHONE ENCOUNTER
Levothyroxine 112 mcg    LOV 4/20/21    Last labs 2/7/22  Ref Range & Units 1 mo ago 1 yr ago 2 yr ago 3 yr ago 4 yr ago 5 yr ago 6 yr ago      TSH 0.450 - 4.500 uIU/mL <0.005 Low   0.016 Low   27.460 High   12.510 High   14.990 High   4.660 High   2.050      2/9/2022  3:59 PM CST Back to Top        Called Paulina with results, levothyroxine dose adjusted, recheck TSH in 8 weeks     Left message to call back [facility] - needs lab appt soon  April 1, 2022  Mellissa Gonzalez MA

## 2022-04-04 RX ORDER — LEVOTHYROXINE SODIUM 200 UG/1
200 TABLET ORAL DAILY
Qty: 30 TABLET | Refills: 0 | Status: SHIPPED | OUTPATIENT
Start: 2022-04-04 | End: 2022-07-19

## 2022-04-05 NOTE — TELEPHONE ENCOUNTER
Please call her to have her get her TSH checked- we need to make sure she's on the right dose and I adjusted it. Lm to call clinic to schedule

## 2022-07-13 ENCOUNTER — MYC MEDICAL ADVICE (OUTPATIENT)
Dept: NEUROLOGY | Facility: CLINIC | Age: 42
End: 2022-07-13

## 2022-07-18 DIAGNOSIS — E03.9 ACQUIRED HYPOTHYROIDISM: ICD-10-CM

## 2022-07-18 PROCEDURE — 36415 COLL VENOUS BLD VENIPUNCTURE: CPT | Performed by: NURSE PRACTITIONER

## 2022-07-19 ENCOUNTER — LAB (OUTPATIENT)
Dept: LAB | Facility: CLINIC | Age: 42
End: 2022-07-19
Payer: COMMERCIAL

## 2022-07-19 ENCOUNTER — MYC MEDICAL ADVICE (OUTPATIENT)
Dept: FAMILY MEDICINE | Facility: CLINIC | Age: 42
End: 2022-07-19

## 2022-07-19 DIAGNOSIS — E03.9 ACQUIRED HYPOTHYROIDISM: Primary | ICD-10-CM

## 2022-07-19 DIAGNOSIS — G08 CEREBRAL VENOUS THROMBOSIS OF CORTICAL VEIN: ICD-10-CM

## 2022-07-19 LAB — TSH BLD-ACNC: <0.005 UIU/ML (ref 0.45–4.5)

## 2022-07-19 PROCEDURE — 36415 COLL VENOUS BLD VENIPUNCTURE: CPT

## 2022-07-19 PROCEDURE — 82746 ASSAY OF FOLIC ACID SERUM: CPT

## 2022-07-19 PROCEDURE — 82607 VITAMIN B-12: CPT

## 2022-07-19 RX ORDER — LEVOTHYROXINE SODIUM 175 UG/1
175 TABLET ORAL DAILY
Qty: 90 TABLET | Refills: 0 | Status: SHIPPED | OUTPATIENT
Start: 2022-07-19 | End: 2022-11-17

## 2022-07-19 NOTE — TELEPHONE ENCOUNTER
Patient sent mychart inquiring on low TSH level drawn yesterday.     Component TSH TSH   Latest Ref Rng & Units 0.450 - 4.500 uIU/mL 0.40 - 4.00 mU/L   6/23/2020 0.016 (L)    10/18/2020  0.30 (L)   10/19/2020  0.49   2/7/2022 <0.005 (L)    7/18/2022 <0.005 (L)        Patient has been taking levothyroxine 200mcg QD since Feb 2022 when dose was decreased from 224mcg QD (two 112mcg tabs/day).     Routed to Mikaela Zarco RN

## 2022-07-20 DIAGNOSIS — E03.9 ACQUIRED HYPOTHYROIDISM: Primary | ICD-10-CM

## 2022-07-20 LAB
FOLATE SERPL-MCNC: 11.6 NG/ML (ref 4.6–34.8)
VIT B12 SERPL-MCNC: 286 PG/ML (ref 232–1245)

## 2022-10-16 ENCOUNTER — HEALTH MAINTENANCE LETTER (OUTPATIENT)
Age: 42
End: 2022-10-16

## 2022-11-15 DIAGNOSIS — E03.9 ACQUIRED HYPOTHYROIDISM: Primary | ICD-10-CM

## 2022-11-15 PROCEDURE — 36415 COLL VENOUS BLD VENIPUNCTURE: CPT | Performed by: FAMILY MEDICINE

## 2022-11-16 LAB
T4 FREE SERPL-MCNC: 1.16 NG/DL (ref 0.82–1.77)
TSH BLD-ACNC: 0.94 UIU/ML (ref 0.45–4.5)

## 2022-11-17 ENCOUNTER — MYC MEDICAL ADVICE (OUTPATIENT)
Dept: FAMILY MEDICINE | Facility: CLINIC | Age: 42
End: 2022-11-17

## 2022-11-17 DIAGNOSIS — E03.9 ACQUIRED HYPOTHYROIDISM: ICD-10-CM

## 2022-11-17 RX ORDER — LEVOTHYROXINE SODIUM 150 UG/1
150 TABLET ORAL DAILY
Qty: 90 TABLET | Refills: 1 | Status: SHIPPED | OUTPATIENT
Start: 2022-11-17 | End: 2023-08-31

## 2023-03-26 ENCOUNTER — HEALTH MAINTENANCE LETTER (OUTPATIENT)
Age: 43
End: 2023-03-26

## 2023-05-11 ENCOUNTER — TELEPHONE (OUTPATIENT)
Dept: OTHER | Facility: CLINIC | Age: 43
End: 2023-05-11
Payer: COMMERCIAL

## 2023-05-11 NOTE — TELEPHONE ENCOUNTER
Patient has not been seen at [facility] since 1/25/22.  Patient needs to be scheduled for telephone visit with Merlyn Nye PA-C to discuss if she needs to remain on ASA.    Appt note: Follow up to 1/25/22 for cerebral venous thrombosis of cortical vein. Patient would like to discuss ASA.     Marcia BOWSER, RN    [facility]  Office: [phone]  Fax: [phone]

## 2023-05-12 ENCOUNTER — OFFICE VISIT (OUTPATIENT)
Dept: FAMILY MEDICINE | Facility: CLINIC | Age: 43
End: 2023-05-12

## 2023-05-12 VITALS — DIASTOLIC BLOOD PRESSURE: 84 MMHG | SYSTOLIC BLOOD PRESSURE: 125 MMHG | HEART RATE: 76 BPM | OXYGEN SATURATION: 100 %

## 2023-05-12 DIAGNOSIS — K62.9 PERIANAL LESION: Primary | ICD-10-CM

## 2023-05-12 PROCEDURE — 11420 EXC H-F-NK-SP B9+MARG 0.5/<: CPT | Performed by: FAMILY MEDICINE

## 2023-05-12 PROCEDURE — 99213 OFFICE O/P EST LOW 20 MIN: CPT | Mod: 25 | Performed by: FAMILY MEDICINE

## 2023-05-12 RX ORDER — ASPIRIN 81 MG/1
81 TABLET, CHEWABLE ORAL
COMMUNITY
Start: 2023-03-21 | End: 2023-10-30

## 2023-05-12 NOTE — PROGRESS NOTES
Haylee Gallardo is a 42 year old patient who presents to clinic for evaluation.  Noticed small anal protrusion that has not resolved.  It is asymptomatic but bothersome.  Occasional bleeding when wiping.  No other concerns.        Review of Systems   Constitutional, HEENT, cardiovascular, pulmonary, gi and gu systems are negative, except as otherwise noted.      Objective    /84   Pulse 76   SpO2 100%     General: Well appearing, NAD  Rectal: chaperoned exam.  A thin flesh colored papillary lesion is noted at the perianal opening  Psych: normal mood and affect        PROCEDURE:    After potential risks and alternative options were discussed, written consent was obtained.   Using forceps and curved iris scissors the lesion was excised at that base.  Hemostasis was achieved with direct pressure and silver nitrate.  Vaseline was applied and a dressing was placed.  There was minimal blood loss.  The patient tolerated the procedure well and there were no immediate complications.  The specimen was labeled and sent to pathology for review.  Proper wound care and reasons to follow up were discussed and understood.        Perianal lesion  Likely skin tag, will confirm with pathology  - scalp, neck, hands, feet, genitalia, other            Answers for HPI/ROS submitted by the patient on 5/11/2023  What is the reason for your visit today? : Growth on rectum  How many servings of fruits and vegetables do you eat daily?: 0-1  On average, how many sweetened beverages do you drink each day (Examples: soda, juice, sweet tea, etc.  Do NOT count diet or artificially sweetened beverages)?: 1  How many minutes a day do you exercise enough to make your heart beat faster?: 20 to 29  How many days a week do you exercise enough to make your heart beat faster?: 4  How many days per week do you miss taking your medication?: 1  What makes it hard for you to take your medication every day?: remembering to take

## 2023-05-17 LAB
.: NORMAL
CLINICIAN PROVIDED ICD10: NORMAL
PATHOLOGIST PROVIDED ICD10: NORMAL

## 2023-05-24 NOTE — TELEPHONE ENCOUNTER
Patient scheduled for virtual telephone visit with Merlyn Nye on 06/05/23. Phone number [phone]

## 2023-06-05 ENCOUNTER — VIRTUAL VISIT (OUTPATIENT)
Dept: OTHER | Facility: CLINIC | Age: 43
End: 2023-06-05
Attending: PHYSICIAN ASSISTANT

## 2023-06-05 DIAGNOSIS — G08 CEREBRAL VENOUS THROMBOSIS OF CORTICAL VEIN: Primary | ICD-10-CM

## 2023-06-05 PROCEDURE — 99213 OFFICE O/P EST LOW 20 MIN: CPT | Mod: 95 | Performed by: PHYSICIAN ASSISTANT

## 2023-06-05 NOTE — PROGRESS NOTES
Paulina is a 43 year old who is being evaluated via a billable telephone visit.      What phone number would you like to be contacted at? [phone]  How would you like to obtain your AVS? Diogo Pitts MA

## 2023-06-05 NOTE — PROGRESS NOTES
[facility] CENTER  VASCULAR MEDICINE FOLLOW-UP VISIT      PRIMARY HEALTH CARE PROVIDER:  Ismael Ribera    REASON FOR VISIT:  ? Ongoing need of aspirin given recurrent epistaxis       HPI: Paulina Diana is a very pleasant 41 year old [id number] female admitted 8/10/20 to [facility] for presumed ectopic cornu pregnancy S/P IM MTX w/ resolution of ectopic w/o prior h/o VTE who then presented to R 10/17/2020 with persistent headache x 3 days that woke up her from sleep and was found to have CVST. Symptoms included photophobia, nausea and vomiting. In addition she had numbness and clumsiness of right hand. She was transferred to [facility] for further evaluation. CT head showed hyperdensities suggestive of sinus vein thrombosis, CTV revealed SSS, R transversae sinus, sigmoid sinus and upper jugular thrombosis and questionable right venous infarct.  CT perfusion abnormalities did not correspond with arterial territories. Found to have CVST 2/2 prothrombotic state due to recent ectopic pregnancy. She was treated with heparin gtt high intensity treatment, and eventually transitioned to warfarin with Lovenox bridge.  Also found incidentally during workup was leptomeningeal enhancement on MRI Brain, being worked up by neurology.     She is a prothrombin heterozygote with elevated homocysteine.     MRV in 10/2021 has documented recanalization of the CVSTs, and her MTHFR mutation was noted to be normal. Complement levels drawn due to ANCA elevation were normal. She has no residual neurologic sxs, and no headaches.      She had completed over 12 months of anticoagulation. Dr. Dickson discontinued her warfarin in 2021. She was placed on aspirin 81 mg daily.     She has been doing well but in the past year has had increased nosebleeds. She at first thought it was due to the cold weather and dryness, but more recently when she was scratched by a cat at work ([employer]) she also had prolonged bleeding. She is thinking it  may be secondary to the aspirin. She has been getting nosebleeds and swallowing large clots almost every other day. She had several surgeries on her nasal septum when she was a kid and hasn't really seen ENT since.         PAST MEDICAL HISTORY  Past Medical History:   Diagnosis Date     Ectopic pregnancy, tubal 1/28/2018     Thyroid disease     hypothyroid       PAST SURGICAL HISTORY:  Past Surgical History:   Procedure Laterality Date     DILATION AND CURETTAGE SUCTION N/A 5/27/2016    Procedure: DILATION AND CURETTAGE SUCTION;  Surgeon: Natacha Goyal MD;  Location: [facility]     ENT SURGERY      septal repair     LAPAROSCOPIC EVACUATION ECTOPIC PREGNANCY Left 1/28/2018    Procedure: LAPAROSCOPIC EVACUATION ECTOPIC PREGNANCY;  LAPAROSCOPIC SINGLE SITE EVACUATION OF ECTOPIC PREGNANCY, LEFT SALPINGECTOMY;  Surgeon: Erum Boles MD;  Location:  OR     LAPAROSCOPIC SALPINGECTOMY Left 1/28/2018    Procedure: LAPAROSCOPIC SALPINGECTOMY;;  Surgeon: Erum Boles MD;  Location:  OR     LAPAROSCOPIC SALPINGO-OOPHORECTOMY  11/2/2013    Procedure: LAPAROSCOPIC SALPINGO-OOPHORECTOMY;  Single port laparoscopic, Evacuation of hemo-peritonium and Products of Conception;  Surgeon: Peggy Thornton MD;  Location:  OR     NOSE SURGERY      age 12         CURRENT MEDICATIONS  aspirin (ASA) 81 MG chewable tablet, Take 81 mg by mouth  levothyroxine (SYNTHROID/LEVOTHROID) 150 MCG tablet, Take 1 tablet (150 mcg) by mouth daily Recheck TSH due between 12/26/22 and 1/10/23  UNABLE TO FIND, MEDICATION NAME: IUD    No current facility-administered medications on file prior to visit.      ALLERGIES   No Known Allergies    FAMILY HISTORY  No family history on file.    SOCIAL HISTORY  Social History     Socioeconomic History     Marital status:      Spouse name: Not on file     Number of children: Not on file     Years of education: Not on file     Highest education level: Not on file   Occupational  History     Not on file   Tobacco Use     Smoking status: Never Smoker     Smokeless tobacco: Never Used     Tobacco comment:  on occasion   Substance and Sexual Activity     Alcohol use: No     Alcohol/week: 0.0 standard drinks     Comment: rare     Drug use: No     Sexual activity: Yes     Partners: Male       ROS:   Complete ROS negative other than what is stated in HPI.     EXAM:  There were no vitals taken for this visit.  In general, the patient is a pleasant female in no apparent distress.    Unable to do an exam as this was a telephone visit.     Labs:    Component      Latest Ref Rng & Units 11/27/2020 9/27/2021 12/13/2021   D-Dimer      0.0 - 0.50 ug/ml FEU 0.3     D-Dimer Quantitative      0.00 - 0.50 ug/mL FEU  <0.27 <0.27       Procedures:   MR VENOGRAM OF THE HEAD WITHOUT AND WITH CONTRAST  10/4/2021 9:50 AM      HISTORY: Cerebral venous thrombosis of cortical vein.       TECHNIQUE: 2D TOF and 2D phase contrast MR venogram without contrast  material. 3D TOF MR venogram with 12 mL Gadavist.     COMPARISON: MRV 12/9/2020, 10/18/2020.     FINDINGS: Improved patency of the superior sagittal sinus, right  transverse sinus, sigmoid sinus, and jugular bulb. A few wispy areas  of hypointensity are present within the posterior superior sagittal  sinus, right transverse sinus, and right sigmoid sinus, less  conspicuous compared to the prior exam, probably representing chronic  septa related to recanalization. Dural venous sinuses are otherwise  widely patent. Cortical veins are without appreciable abnormality.                                                                      IMPRESSION: Continued improvement in patency of the superior sagittal  sinus, right transverse sinus, and right sigmoid sinus.    Assessment and Plan:   (G08) Cerebral venous thrombosis of cortical vein in 10/2020     -MRV in 10/2021 has documented recanalization of the CVSTs, and her MTHFR mutation was noted to be normal.  Complement levels drawn due to ANCA elevation were normal. She has no residual neurologic sxs, and no headaches.  -Doing well after stopping anticoagulation in 11/2021 with normal d.dimer.     Plan:  -Continue off anticoagulation.  -Stop and hold aspirin.   -Advised that she see ENT again given recurrent epistaxis. She may just have a superficial vessel close to the surface she would need cauterized.   -She should monitor for any signs/symptoms of clotting given her history.   -Follow-up with Vascular Medicine as needed.       Merlyn Nye PA-C        Telephone Visit Details    Start Time: 10:20 am  Video End Time: 10:30 am    Originating Location (patient location): Home  Distant Location (provider location): [facility]      This visit is being conducted as a virtual visit due to the emphasis on mitigation of the COVID-19 virus pandemic. The clinician has decided that the risk of an in-office visit outweighs the benefit for this patient.           25 minutes spent on the date of the encounter doing chart review, history and exam, documentation, and further activities as noted above.

## 2023-06-12 NOTE — TELEPHONE ENCOUNTER
[facility]ENTER    Who is the name of the provider?:  Randolph    What is the location you see this provider at/preferred location?: [place]  Person calling / Facility: Paulina Diana  Phone number:  [phone]  Nurse call back needed:  NO     Reason for call:  Patient experiencing frequent nosebleeds and excessive bleeding when cut. Patient wondering if current aspirin regimen is still needed. History of stroke.    Pharmacy location:  n/a  Outside Imaging: n/a   Can we leave a detailed message on this number?  YES        abdominal pain

## 2023-06-19 ENCOUNTER — TRANSFERRED RECORDS (OUTPATIENT)
Dept: FAMILY MEDICINE | Facility: CLINIC | Age: 43
End: 2023-06-19

## 2023-06-19 ENCOUNTER — TRANSFERRED RECORDS (OUTPATIENT)
Dept: HEALTH INFORMATION MANAGEMENT | Facility: CLINIC | Age: 43
End: 2023-06-19
Payer: COMMERCIAL

## 2023-08-07 ENCOUNTER — TRANSFERRED RECORDS (OUTPATIENT)
Dept: FAMILY MEDICINE | Facility: CLINIC | Age: 43
End: 2023-08-07

## 2023-08-30 DIAGNOSIS — E03.9 ACQUIRED HYPOTHYROIDISM: ICD-10-CM

## 2023-08-31 RX ORDER — LEVOTHYROXINE SODIUM 150 UG/1
150 TABLET ORAL DAILY
Qty: 90 TABLET | Refills: 1 | Status: SHIPPED | OUTPATIENT
Start: 2023-08-31 | End: 2024-03-19

## 2023-08-31 NOTE — TELEPHONE ENCOUNTER
Med: levothyroxine        LOV (related): 4/20/21    Last Lab: 11/15/22      Due for F/U around: None       Next Appt: None         TSH   Date Value Ref Range Status   10/19/2020 0.49 0.40 - 4.00 mU/L Final     T4 Total   Date Value Ref Range Status   03/13/2012 4.6 4.5 - 12.0 ug/dL Final     T4 Free   Date Value Ref Range Status   11/15/2022 1.16 0.82 - 1.77 ng/dL Final

## 2023-09-26 ENCOUNTER — MYC MEDICAL ADVICE (OUTPATIENT)
Dept: FAMILY MEDICINE | Facility: CLINIC | Age: 43
End: 2023-09-26

## 2023-09-26 ENCOUNTER — MYC MEDICAL ADVICE (OUTPATIENT)
Dept: OTHER | Facility: CLINIC | Age: 43
End: 2023-09-26
Payer: COMMERCIAL

## 2023-09-26 DIAGNOSIS — G08 CEREBRAL VENOUS SINUS THROMBOSIS: Primary | ICD-10-CM

## 2023-09-26 NOTE — TELEPHONE ENCOUNTER
Impossible to definitively rule out anatomical issues in her brain without an MRV. Please coordinate getting this scheduled. See orders. Please have her see me one week after imaging. If it is normal or unchanged, she will be referred to her PCP to address further concerns.

## 2023-09-26 NOTE — TELEPHONE ENCOUNTER
Discussed with Dr. Dickson - MRV  brain w/contrast was ordered - should have been MRV brain with and without contrast.    Routing to scheduling to changed MRV scheduled on 10/13 to     MRV brain with AND without contrast.    Please also schedule in clinic follow up with Dr. Dickson about 3-7 days later.

## 2023-09-26 NOTE — TELEPHONE ENCOUNTER
Routing to scheduling to arrange for:    MRV brain with contrast  Follow up one week later.  In clinic.

## 2023-09-26 NOTE — TELEPHONE ENCOUNTER
MRV brain scheduled 10/13/23.    Please arrange for in clinic visit with Dr. Dickson about 3-7 days later.

## 2023-09-27 NOTE — TELEPHONE ENCOUNTER
Left voicemail with instructions for patient to call back to schedule their appointment(s)    September 27, 2023 , 8:31 AM

## 2023-10-03 NOTE — TELEPHONE ENCOUNTER
LM asking patient to call to schedule and stated this is our 2nd and final attempt to reach out but that patient should feel free to call our clinic at any point in the future to schedule.

## 2023-10-13 ENCOUNTER — HOSPITAL ENCOUNTER (OUTPATIENT)
Dept: MRI IMAGING | Facility: CLINIC | Age: 43
Discharge: HOME | End: 2023-10-13
Attending: INTERNAL MEDICINE | Admitting: INTERNAL MEDICINE
Payer: COMMERCIAL

## 2023-10-13 DIAGNOSIS — G08 CEREBRAL VENOUS SINUS THROMBOSIS: ICD-10-CM

## 2023-10-13 PROCEDURE — A9585 GADOBUTROL INJECTION: HCPCS | Performed by: INTERNAL MEDICINE

## 2023-10-13 PROCEDURE — 70546 MR ANGIOGRAPH HEAD W/O&W/DYE: CPT

## 2023-10-13 PROCEDURE — 255N000002 HC RX 255 OP 636: Performed by: INTERNAL MEDICINE

## 2023-10-13 RX ORDER — GADOBUTROL 604.72 MG/ML
12 INJECTION INTRAVENOUS ONCE
Status: COMPLETED | OUTPATIENT
Start: 2023-10-13 | End: 2023-10-13

## 2023-10-13 RX ADMIN — GADOBUTROL 12 ML: 604.72 INJECTION INTRAVENOUS at 13:18

## 2023-10-30 ENCOUNTER — OFFICE VISIT (OUTPATIENT)
Dept: OTHER | Facility: CLINIC | Age: 43
End: 2023-10-30
Attending: INTERNAL MEDICINE
Payer: COMMERCIAL

## 2023-10-30 VITALS
WEIGHT: 278.8 LBS | BODY MASS INDEX: 46.45 KG/M2 | DIASTOLIC BLOOD PRESSURE: 86 MMHG | SYSTOLIC BLOOD PRESSURE: 141 MMHG | OXYGEN SATURATION: 99 % | HEIGHT: 65 IN | HEART RATE: 73 BPM

## 2023-10-30 DIAGNOSIS — G08 CEREBRAL VENOUS SINUS THROMBOSIS: Primary | ICD-10-CM

## 2023-10-30 PROCEDURE — 99214 OFFICE O/P EST MOD 30 MIN: CPT | Performed by: INTERNAL MEDICINE

## 2023-10-30 PROCEDURE — 99213 OFFICE O/P EST LOW 20 MIN: CPT | Performed by: INTERNAL MEDICINE

## 2023-10-30 NOTE — PROGRESS NOTES
[facility] CENTER  VASCULAR MEDICINE FOLLOW-UP VISIT        PRIMARY HEALTH CARE PROVIDER:  Ismael Ribera     REASON FOR VISIT:  ? Ongoing need of aspirin given recurrent epistaxis         HPI: Paulina Diana is a very pleasant 41 year old [id number] female admitted 8/10/20 to [facility] for presumed ectopic cornu pregnancy S/P IM MTX w/ resolution of ectopic w/o prior h/o VTE who then presented to [facility] 10/17/2020 with persistent headache x 3 days that woke up her from sleep and was found to have CVST. Symptoms included photophobia, nausea and vomiting. In addition she had numbness and clumsiness of right hand. She was transferred to [facility] for further evaluation. CT head showed hyperdensities suggestive of sinus vein thrombosis, CTV revealed SSS, R transversae sinus, sigmoid sinus and upper jugular thrombosis and questionable right venous infarct.  CT perfusion abnormalities did not correspond with arterial territories. Found to have CVST 2/2 prothrombotic state due to recent ectopic pregnancy. She was treated with heparin gtt high intensity treatment, and eventually transitioned to warfarin with Lovenox bridge.  Also found incidentally during workup was leptomeningeal enhancement on MRI Brain, being worked up by neurology.     She is a prothrombin heterozygote with elevated homocysteine.     MRV in 10/2021 has documented recanalization of the CVSTs, and her MTHFR mutation was noted to be normal. Complement levels drawn due to ANCA elevation were normal. She has no residual neurologic sxs, and no headaches.      She had completed over 12 months of anticoagulation.I discontinued her warfarin in 2021. She was placed on aspirin 81 mg daily.      She has been doing well but in the past year has had increased nosebleeds. She at first thought it was due to the cold weather and dryness, but more recently when she was scratched by a cat at work ([employer]) she also had prolonged bleeding. She is thinking it may  "be secondary to the aspirin. She has been getting nosebleeds and swallowing large clots almost every other day. She had several surgeries on her nasal septum when she was a kid and hasn't really seen ENT since.     On 09/26/23, she sent us the following [employer] message:    \"For the past 4-5 weeks I have been feeling off. To the point that I feel I have short term memory loss again.  I  forgot to refill and  my thyroid meds I have been on since I was 18 years old I literally forgot I was on thyroid meds once a day for 3 weeks.  Until I saw my pill container and realized it s been weeks that I haven t taken my meds, I have been on my normal meds now for 2 weeks. I have had 3-4 headaches this month, stress at work and feeling weak and not able to sleep deep. I wake up 2-3 times a night.  My muscles feel tense but weak.  I am more hungry than normal and I have been irritated and chi for a couple of weeks and that is not like me.  I feel foggy and forgetful. Wondering if a check up  and maybe bloodwork with a re check CT scan of my brain is warranted.  Everytime I get a head I gets so scared that I have another blood clot starting or developing. Please let me know your thoughts and if I could get a referral for the scan?   Thanks. Paulina Diana\"    I obtained an MRV on 10/13/23 which revealed no evidence of venous sinus thrombosis or significant stenotic findings.           PAST MEDICAL HISTORY  Past Medical History        Past Medical History:   Diagnosis Date    Ectopic pregnancy, tubal 1/28/2018    Thyroid disease       hypothyroid            PAST SURGICAL HISTORY:  Past Surgical History         Past Surgical History:   Procedure Laterality Date    DILATION AND CURETTAGE SUCTION N/A 5/27/2016     Procedure: DILATION AND CURETTAGE SUCTION;  Surgeon: Natacha Goyal MD;  Location: [facility]    ENT SURGERY         septal repair    LAPAROSCOPIC EVACUATION ECTOPIC PREGNANCY Left 1/28/2018     Procedure: LAPAROSCOPIC " "EVACUATION ECTOPIC PREGNANCY;  LAPAROSCOPIC SINGLE SITE EVACUATION OF ECTOPIC PREGNANCY, LEFT SALPINGECTOMY;  Surgeon: Erum Boles MD;  Location:  OR    LAPAROSCOPIC SALPINGECTOMY Left 1/28/2018     Procedure: LAPAROSCOPIC SALPINGECTOMY;;  Surgeon: Erum Boles MD;  Location:  OR    LAPAROSCOPIC SALPINGO-OOPHORECTOMY   11/2/2013     Procedure: LAPAROSCOPIC SALPINGO-OOPHORECTOMY;  Single port laparoscopic, Evacuation of hemo-peritonium and Products of Conception;  Surgeon: Peggy Thornton MD;  Location:  OR    NOSE SURGERY         age 12               CURRENT MEDICATIONS  aspirin (ASA) 81 MG chewable tablet, Take 81 mg by mouth  levothyroxine (SYNTHROID/LEVOTHROID) 150 MCG tablet, Take 1 tablet (150 mcg) by mouth daily Recheck TSH due between 12/26/22 and 1/10/23  UNABLE TO FIND, MEDICATION NAME: IUD     No current facility-administered medications on file prior to visit.        ALLERGIES   No Known Allergies     FAMILY HISTORY  Family History   No family history on file.        SOCIAL HISTORY  Social History            Socioeconomic History    Marital status:        Spouse name: Not on file    Number of children: Not on file    Years of education: Not on file    Highest education level: Not on file   Occupational History    Not on file   Tobacco Use    Smoking status: Never Smoker    Smokeless tobacco: Never Used    Tobacco comment:  on occasion   Substance and Sexual Activity    Alcohol use: No       Alcohol/week: 0.0 standard drinks       Comment: rare    Drug use: No    Sexual activity: Yes       Partners: Male         ROS:   Complete ROS negative other than what is stated in HPI.      EXAM:  BP (!) 141/86 (BP Location: Right arm, Patient Position: Chair, Cuff Size: Adult Large)   Pulse 73   Ht 5' 5\" (1.651 m)   Wt 278 lb 12.8 oz (126.5 kg)   SpO2 99%   BMI 46.39 kg/m          Physical exam Reveals:    O/P: WNL  HEENT: WNL  NECK: No JVD, thyromegaly, or " lymphadenopathy  HEART: RRR, no murmurs, gallops, or rubs  LUNGS: CTA bilaterally without rales, wheezes, or rhonchi  GI: NABS, nondistended, nontender, soft  EXT:without cyanosis, clubbing, or edema  NEURO: nonfocal  : no flank tenderness       Labs:     Component      Latest Ref Rng & Units 11/27/2020 9/27/2021 12/13/2021   D-Dimer      0.0 - 0.50 ug/ml FEU 0.3       D-Dimer Quantitative      0.00 - 0.50 ug/mL FEU   <0.27 <0.27         Procedures:   MR VENOGRAM OF THE HEAD WITHOUT AND WITH CONTRAST  10/4/2021 9:50 AM      HISTORY: Cerebral venous thrombosis of cortical vein.       TECHNIQUE: 2D TOF and 2D phase contrast MR venogram without contrast  material. 3D TOF MR venogram with 12 mL Gadavist.     COMPARISON: MRV 12/9/2020, 10/18/2020.     FINDINGS: Improved patency of the superior sagittal sinus, right  transverse sinus, sigmoid sinus, and jugular bulb. A few wispy areas  of hypointensity are present within the posterior superior sagittal  sinus, right transverse sinus, and right sigmoid sinus, less  conspicuous compared to the prior exam, probably representing chronic  septa related to recanalization. Dural venous sinuses are otherwise  widely patent. Cortical veins are without appreciable abnormality.                                                                      IMPRESSION: Continued improvement in patency of the superior sagittal  sinus, right transverse sinus, and right sigmoid sinus.      MR VENOGRAM OF THE HEAD WITHOUT AND WITH CONTRAST  10/13/2023 1:59 PM      HISTORY: Cerebral venous sinus thrombosis      TECHNIQUE: 2D TOF and 2D phase contrast MR venogram without contrast  material. 3D TOF MR venogram with 12 mL Gadavist.     COMPARISON: None.      FINDINGS:  The superior sagittal sinus, internal cerebral veins, vein  of Tyler, and straight sinus all appear patent. The transverse sinuses  are symmetric and patent. The sigmoid sinuses appear patent.                                                                        IMPRESSION: No evidence of venous sinus thrombosis or significant  stenotic findings.     YUMIKO MORALES MD            Assessment and Plan:   (G08) Cerebral venous thrombosis of cortical vein in 10/2020      -MRV in 10/2021 has documented recanalization of the CVSTs, and her MTHFR mutation was noted to be normal. Complement levels drawn due to ANCA elevation were normal. She has no residual neurologic sxs, and no headaches.  -Doing well after stopping anticoagulation in 11/2021 with normal d dimer and thrombus resolution.  -Stopped ASA due to epistaxis.   -I advised her of most recent MRV findings being normal. There is no thrombotic or other vascular etiology for her headache.       Plan:  -Continue off anticoagulation.  -Continue off of aspirin.   -She should monitor for any signs/symptoms of clotting given her history.   -Address headaches through neurology.   -Follow-up with Vascular Medicine as needed.         Merlyn Nye PA-C

## 2023-10-30 NOTE — PROGRESS NOTES
"Patient is here to discuss follow up    BP (!) 141/86 (BP Location: Right arm, Patient Position: Chair, Cuff Size: Adult Large)   Pulse 73   Ht 5' 5\" (1.651 m)   Wt 278 lb 12.8 oz (126.5 kg)   SpO2 99%   BMI 46.39 kg/m      Questions patient would like addressed today are: N/A.    Refills are needed: No    Has homecare services and agency name:  [facility]    "

## 2023-11-04 ENCOUNTER — HEALTH MAINTENANCE LETTER (OUTPATIENT)
Age: 43
End: 2023-11-04

## 2023-11-08 DIAGNOSIS — R51.9 HEADACHE: Primary | ICD-10-CM

## 2023-11-08 NOTE — PROGRESS NOTES
Referral entered for general neurology per Dr. Earl.   Gwendolyn Gutiérrez, RN 11/8/2023 2:15 PM

## 2023-11-09 ENCOUNTER — TELEPHONE (OUTPATIENT)
Dept: NEUROLOGY | Facility: CLINIC | Age: 43
End: 2023-11-09
Payer: COMMERCIAL

## 2023-11-09 NOTE — TELEPHONE ENCOUNTER
"LVM, sent MyC for pt to sched with NGN for     \"pressure on my temples, foggy ness and headaches\"     11/9/23 BD  "

## 2024-01-19 ENCOUNTER — HOSPITAL ENCOUNTER (OUTPATIENT)
Dept: MAMMOGRAPHY | Facility: CLINIC | Age: 44
Discharge: HOME | End: 2024-01-19
Attending: OBSTETRICS & GYNECOLOGY | Admitting: OBSTETRICS & GYNECOLOGY
Payer: COMMERCIAL

## 2024-01-19 DIAGNOSIS — Z12.31 VISIT FOR SCREENING MAMMOGRAM: ICD-10-CM

## 2024-01-19 PROCEDURE — 77063 BREAST TOMOSYNTHESIS BI: CPT

## 2024-01-22 ENCOUNTER — TELEPHONE (OUTPATIENT)
Dept: NEUROLOGY | Facility: CLINIC | Age: 44
End: 2024-01-22
Payer: COMMERCIAL

## 2024-01-22 ASSESSMENT — MIGRAINE DISABILITY ASSESSMENT (MIDAS)
TOTAL SCORE: 0
HOW MANY DAYS DID YOU MISS WORK OR SCHOOL BECAUSE OF HEADACHES: 0
ON A SCALE FROM 0-10 ON AVERAGE HOW PAINFUL WERE HEADACHES: 3
HOW OFTEN WERE SOCIAL ACTIVITIES MISSED DUE TO HEADACHES: 0
HOW MANY DAYS WAS YOUR PRODUCTIVITY CUT IN HALF BECAUSE OF HEADACHES: 0
HOW MANY DAYS IN THE PAST 3 MONTHS HAVE YOU HAD A HEADACHE: 0
HOW MANY DAYS WAS HOUSEWORK PRODUCTIVITY CUT IN HALF DUE TO HEADACHES: 0
HOW MANY DAYS DID YOU NOT DO HOUSEWORK BECAUSE OF HEADACHES: 0

## 2024-01-22 ASSESSMENT — HEADACHE IMPACT TEST (HIT 6)
HOW OFTEN HAVE YOU FELT TOO TIRED TO WORK BECAUSE OF YOUR HEADACHES: NEVER
WHEN YOU HAVE HEADACHES HOW OFTEN IS THE PAIN SEVERE: RARELY
HOW OFTEN DO HEADACHES LIMIT YOUR DAILY ACTIVITIES: NEVER
WHEN YOU HAVE A HEADACHE HOW OFTEN DO YOU WISH YOU COULD LIE DOWN: RARELY
HOW OFTEN HAVE YOU FELT FED UP OR IRRITATED BECAUSE OF YOUR HEADACHES: RARELY
HIT6 TOTAL SCORE: 44
HOW OFTEN DID HEADACHS LIMIT CONCENTRATION ON WORK OR DAILY ACTIVITY: RARELY

## 2024-01-22 NOTE — TELEPHONE ENCOUNTER
An appointment reminder message was left for patients new patient visit with Neurology- Headache clinic with Dr. Forrester on 1-23-24

## 2024-01-23 ENCOUNTER — OFFICE VISIT (OUTPATIENT)
Dept: NEUROLOGY | Facility: CLINIC | Age: 44
End: 2024-01-23
Attending: PSYCHIATRY & NEUROLOGY
Payer: COMMERCIAL

## 2024-01-23 VITALS
HEART RATE: 73 BPM | OXYGEN SATURATION: 100 % | SYSTOLIC BLOOD PRESSURE: 128 MMHG | WEIGHT: 260 LBS | BODY MASS INDEX: 43.32 KG/M2 | HEIGHT: 65 IN | DIASTOLIC BLOOD PRESSURE: 85 MMHG

## 2024-01-23 DIAGNOSIS — G44.219 EPISODIC TENSION-TYPE HEADACHE, NOT INTRACTABLE: Primary | ICD-10-CM

## 2024-01-23 PROCEDURE — 99215 OFFICE O/P EST HI 40 MIN: CPT | Performed by: PSYCHIATRY & NEUROLOGY

## 2024-01-23 RX ORDER — NAPROXEN 500 MG/1
500 TABLET ORAL 2 TIMES DAILY WITH MEALS
Qty: 15 TABLET | Refills: 3 | Status: SHIPPED | OUTPATIENT
Start: 2024-01-23

## 2024-01-23 ASSESSMENT — PAIN SCALES - GENERAL: PAINLEVEL: NO PAIN (1)

## 2024-01-23 NOTE — PROGRESS NOTES
[facility]   Headache Neurology Consult  January 23, 2024     Paulina Diana MRN# [id number]   YOB: 1980 Age: 43 year old     Requesting provider: Ghada Earl MD          Assessment and Recommendations:     Paulina Diana is a 43 year old female, who is a prothrombin and MTHFR coheterozygote, off warfarin with cerebral venous sinus thrombosis history.  CVST was thought to have been provoked by a prothrombotic state in the setting of ectopic pregnancy with methotrexate use. She stopped taking warfarin in June 2022 and stopped aspirin due to recurrent epistaxis. Headaches of her current presentation have not been as severe as when she had a CVST and she understands to report to emergency department if her headache is as severe again.    In 2010, she had leptomeningeal enhancement identified on her brain MRI and CSF studies (I have reviewed these) and they were contaminated with RBCs, but no cause was found at that time. This was not reported in the radiology reports, but observed on neurology's visualization of the MRI and it resolved. I reviewed her 12/9/2020 MRI brain, the only one available to me, the others are MR brain venograms and noted possible motion artifact, but minimal if any leptomeningeal enhancement at all. She has no papilledema on exam today and I have no clinical suspicion for IIH based on her report of headaches.    We discussed that these have features of tension type headaches at this point. We discussed that persisting intermittent headaches at this low level are more of an annoyance for her and that they do invoke stress from that initial CVST. She has had a recent MR brain venogram without evidence of any stenosis or new thrombosis. These are also episodically recurring, but new since August 2023, so we will proceed with MRI brain with and without contrast, CT head and neck angiogram and CBC/CMP at this time.     Headache treatment plan:  Acute  medication recommendations:  Naproxen 500mg at onset, limit to less than 15 days per month    Preventative medication recommendations:  She will try acupuncture for tension type headaches for 3 months  She will also trial massage and we discussed cognitive behavioral therapy as an option for treatment given the stress surrounding their recurrence    It will be important to follow up with me in 3 months to assess response to these.    Total time spent today was 52 minutes in chart review, history, exam and counseling.      Caryn Kim MD  Neurology            Chief Complaint:     Chief Complaint   Patient presents with    Headache     Headache / Ref by YAJAIRA CLEMENTE  Duration: 1x week, Duration: currently 6hrs. Medication: IBU or tylenol helps it get resolved but did not take it this time. No prescription based medications for headache. Symptoms: feels like fogginess at L temple to R temple across the forehead. Cheese sabina, slight heat sensation. Denies nausea, vomiting or visual changes. Stroke diagnosis 10/2020. 3 blood clots in brain.            History is obtained from the patient and medical record.      Paulina Diana is a 43 year old female whose first headache was Sept 26, 2021. Prior to this, she would have a concussion related headache, but really no other headaches.     That day she awoke with tinnitus like a low pitched loud train on laying down and more than usual and her head was on fire and the top of her head hurt and she could not balance right. She vomited everywhere. She called work and was told she had to come in. She took ibuprofen 2 days straight and that got rid of it.   It recurred but was vomiting at work and told to go home.    She was told that it was a migraine. She went to bed and her right arm and leg went numb  and her headaches were very severe and she was evaluated for it on October 17, 2020 and was found to have a stroke. She did not have COVID at that time, and not  symptomatic.   She was on warfarin. In July 2020 she had a miscarriage and thought it was from methotrexate injections. Then there the thought that blood clots were in her uterus and had not passed and made her hypercoagulable. She had photophobia, phonophobia and has nausea and vomiting. She had 3 blood clots in her vein.    She will take medications and tylenol and they tend to come on in waves.   There is pressure in the left temple and bifrontal. This will be a 2-3/10 at time of headache onset. She will drop something once per day, she feels due to the stress the headaches provoke.     She has been drinking more water for the past month. She has not had a headache as severe as back when she have the CVST. She will get brain fog. She does have photophobia, but not phonophobia, osmophobia. No nausea or vomiting. Activity does not typically worsen the headaches. They do not throb. There is a pressure, a foggy feeling and a wave of thunder. Pain is not radiating, it is temple to temple. It is an annoyance and wants to take a tylenol as she has one once per week. They are mild. They are always 2-5/10.     No visual symptoms, diplopia, sudden vision loss, blurred vision.     No fevers, but there is flushing in her cheeks, drenching night sweats, loss of appetite, unexplained weight loss.     She only gets 5 hours of sleep and grew up . She also describes herself as a worker bee, always keeps moving.     Headaches have not progressed since last MR brain venogram.                Past Medical History:     Past Medical History:   Diagnosis Date    Ectopic pregnancy, tubal 1/28/2018    Thyroid disease     hypothyroid              Past Surgical History:     Past Surgical History:   Procedure Laterality Date    DILATION AND CURETTAGE SUCTION N/A 5/27/2016    Procedure: DILATION AND CURETTAGE SUCTION;  Surgeon: Natacha Goyal MD;  Location: [facility]    ENT SURGERY      septal repair    LAPAROSCOPIC EVACUATION ECTOPIC  PREGNANCY Left 1/28/2018    Procedure: LAPAROSCOPIC EVACUATION ECTOPIC PREGNANCY;  LAPAROSCOPIC SINGLE SITE EVACUATION OF ECTOPIC PREGNANCY, LEFT SALPINGECTOMY;  Surgeon: Erum Boles MD;  Location:  OR    LAPAROSCOPIC SALPINGECTOMY Left 1/28/2018    Procedure: LAPAROSCOPIC SALPINGECTOMY;;  Surgeon: Erum Boles MD;  Location:  OR    LAPAROSCOPIC SALPINGO-OOPHORECTOMY  11/2/2013    Procedure: LAPAROSCOPIC SALPINGO-OOPHORECTOMY;  Single port laparoscopic, Evacuation of hemo-peritonium and Products of Conception;  Surgeon: Peggy Thornton MD;  Location:  OR    NOSE SURGERY      age 12             Social History:     Social History     Socioeconomic History    Marital status:      Spouse name: Not on file    Number of children: Not on file    Years of education: Not on file    Highest education level: Not on file   Occupational History    Not on file   Tobacco Use    Smoking status: Never    Smokeless tobacco: Never    Tobacco comments:      on occasion   Vaping Use    Vaping Use: Never used   Substance and Sexual Activity    Alcohol use: No     Alcohol/week: 0.0 standard drinks of alcohol     Comment: rare    Drug use: No    Sexual activity: Yes     Partners: Male   Other Topics Concern    Not on file   Social History Narrative    Not on file     Social Determinants of Health     Financial Resource Strain: Not on file   Food Insecurity: Not on file   Transportation Needs: Not on file   Physical Activity: Not on file   Stress: Not on file   Social Connections: Not on file   Interpersonal Safety: Not on file   Housing Stability: Not on file             Family History:   History reviewed. No pertinent family history. None          Allergies:    No Known Allergies          Medications:   Acute headache medications:  None    Preventative headache medications:  None    Current Outpatient Medications:     levothyroxine (SYNTHROID/LEVOTHROID) 150 MCG tablet, TAKE 1 TABLET BY  "MOUTH DAILY, Disp: 90 tablet, Rfl: 1    UNABLE TO FIND, MEDICATION NAME: IUD, Disp: , Rfl:           Physical Exam:   /85   Pulse 73   Ht 1.651 m (5' 5\")   Wt 117.9 kg (260 lb)   SpO2 100%   BMI 43.27 kg/m     Physical Exam:   Neurologic:   Mental Status Exam: Alert, awake and oriented to situation. No dysarthria. Speech of normal fluency.   Cranial Nerves: Fundoscopic exam with clear disc margins bilaterally. PERRLA, EOMs intact, no nystagmus, facial movements symmetric, facial sensation intact to light touch, hearing intact to conversation, trapezius and SCMs 5/5 bilaterally, tongue midline and fully mobile. No tongue atrophy.    Motor: Normal tone in all four extremities, no atrophy. 5/5 strength bilaterally in shoulder abduction, elbow extensors and flexors, wrist extensors and flexors, hip flexors, knee extensors and flexors, dorsi- and plantarflexion. No tremors or abnormal movements noted.   Sensory: Sensation intact to pinprick and vibration sensation on arms and legs bilaterally.    Coordination: Finger-nose-finger intact bilaterally.  Rapidly alternating movements intact bilaterally in the upper extremities.  Normal finger tapping bilaterally.  Intact heel-shin bilaterally. Normal Romberg.   Reflexes: 2+ and symmetric in triceps, biceps, brachioradialis, patellar, Achilles, and plantars downgoing bilaterally.   Gait: Normal gait.  Able to toe and heel walk.  Tandem gait normal.  Head: Normocephalic, atraumatic. No radiating pain with palpation over the supraorbital notches, occipital nerves.    Eyes: No conjunctival injection, no scleral icterus.           Data:     Narrative & Impression   Examination: 12/10/2020 8:13 AM  MRI of the brain without and with intravenous contrast  MR venography without and with intravenous contrast     HISTORY: Venous sinus thrombosis, leptomeningeal enhancement.     COMPARISON: MRI/MRV 12/9/2020. CT venogram 10/17/2020.     TECHNIQUE: Multiplanar multisequence MRI " of the brain without and with  intravenous contrast. Noncontrast two-dimensional time-of-flight and  three-dimensional contrast-enhanced MR venography were also performed.     FINDINGS: Since prior study, there has been improvement in the patency  of the previously thrombosed superior sagittal sinus, right transverse  sinus, sigmoid sinus, and particularly upper right internal jugular  vein. However, there is new stenosis of the upper right internal  jugular vein just inferior to the skull base. Dilated optic nerve  sheaths persist, with continued flattening of the posterior globes,  consistent with persistent increased intracranial pressure. No  intracranial mass, hydrocephalus, or restricted diffusion.                                                                      Impression: Improved patency of the superior sagittal, right  transverse, and right sigmoid sinuses, with persistent partially  occlusive thrombus. Persistent optic nerve sheath dilation and  posterior globes flattening suggesting continued high intracranial  pressure.     PAOLO FERNANDEZ MD

## 2024-01-23 NOTE — LETTER
1/23/2024         RE: Paulina Diana  [address] [facility] [address]        Dear Colleague,    Thank you for referring your patient, Paulina Diana, to the [facility] NEUROLOGY [facility]. Please see a copy of my visit note below.    [facility]   Headache Neurology Consult  January 23, 2024     Paulina Diana MRN# [id number]   YOB: 1980 Age: 43 year old     Requesting provider: Ghada Earl MD          Assessment and Recommendations:     Paulina Diana is a 43 year old female, who is a prothrombin and MTHFR coheterozygote, off warfarin with cerebral venous sinus thrombosis history.  CVST was thought to have been provoked by a prothrombotic state in the setting of ectopic pregnancy with methotrexate use. She stopped taking warfarin in June 2022 and stopped aspirin due to recurrent epistaxis. Headaches of her current presentation have not been as severe as when she had a CVST and she understands to report to emergency department if her headache is as severe again.    In 2010, she had leptomeningeal enhancement identified on her brain MRI and CSF studies (I have reviewed these) and they were contaminated with RBCs, but no cause was found at that time. This was not reported in the radiology reports, but observed on neurology's visualization of the MRI and it resolved. I reviewed her 12/9/2020 MRI brain, the only one available to me, the others are MR brain venograms and noted possible motion artifact, but minimal if any leptomeningeal enhancement at all. She has no papilledema on exam today and I have no clinical suspicion for IIH based on her report of headaches.    We discussed that these have features of tension type headaches at this point. We discussed that persisting intermittent headaches at this low level are more of an annoyance for her and that they do invoke stress from that initial CVST. She has had a recent MR brain venogram without evidence of any  stenosis or new thrombosis. These are also episodically recurring.     Headache treatment plan:  Acute medication recommendations:  Naproxen 500mg at onset, limit to less than 15 days per month    Preventative medication recommendations:  She will try acupuncture for tension type headaches for 3 months  She will also trial massage and we discussed cognitive behavioral therapy as an option for treatment given the stress surrounding their recurrence    It will be important to follow up with me in 3 months to assess response to these as we may need to reimage with an MRI brain with and without contrast to ensure there is not a recurrence of the leptomeningeal enhancement.    Total time spent today was 52 minutes in chart review, history, exam and counseling.      Caryn Kim MD  Neurology            Chief Complaint:     Chief Complaint   Patient presents with     Headache     Headache / Ref by YAJAIRA CLEMENTE  Duration: 1x week, Duration: currently 6hrs. Medication: IBU or tylenol helps it get resolved but did not take it this time. No prescription based medications for headache. Symptoms: feels like fogginess at L temple to R temple across the forehead. Cheese sabina, slight heat sensation. Denies nausea, vomiting or visual changes. Stroke diagnosis 10/2020. 3 blood clots in brain.            History is obtained from the patient and medical record.      Paulnia Diana is a 43 year old female whose first headache was Sept 26, 2021. Prior to this, she would have a concussion related headache, but really no other headaches.     That day she awoke with tinnitus like a low pitched loud train on laying down and more than usual and her head was on fire and the top of her head hurt and she could not balance right. She vomited everywhere. She called work and was told she had to come in. She took ibuprofen 2 days straight and that got rid of it.   It recurred but was vomiting at work and told to go home.    She was told that  it was a migraine. She went to bed and her right arm and leg went numb  and her headaches were very severe and she was evaluated for it on October 17, 2020 and was found to have a stroke. She did not have COVID at that time, and not symptomatic.   She was on warfarin. In July 2020 she had a miscarriage and thought it was from methotrexate injections. Then there the thought that blood clots were in her uterus and had not passed and made her hypercoagulable. She had photophobia, phonophobia and has nausea and vomiting. She had 3 blood clots in her vein.    She will take medications and tylenol and they tend to come on in waves.   There is pressure in the left temple and bifrontal. This will be a 2-3/10 at time of headache onset. She will drop something once per day, she feels due to the stress the headaches provoke.     She has been drinking more water for the past month. She has not had a headache as severe as back when she have the CVST. She will get brain fog. She does have photophobia, but not phonophobia, osmophobia. No nausea or vomiting. Activity does not typically worsen the headaches. They do not throb. There is a pressure, a foggy feeling and a wave of thunder. Pain is not radiating, it is temple to temple. It is an annoyance and wants to take a tylenol as she has one once per week. They are mild. They are always 2-5/10.     No visual symptoms, diplopia, sudden vision loss, blurred vision.     No fevers, but there is flushing in her cheeks, drenching night sweats, loss of appetite, unexplained weight loss.     She only gets 5 hours of sleep and grew up . She also describes herself as a worker bee, always keeps moving.     Headaches have not progressed since last MR brain venogram.                Past Medical History:     Past Medical History:   Diagnosis Date     Ectopic pregnancy, tubal 1/28/2018     Thyroid disease     hypothyroid              Past Surgical History:     Past Surgical History:    Procedure Laterality Date     DILATION AND CURETTAGE SUCTION N/A 5/27/2016    Procedure: DILATION AND CURETTAGE SUCTION;  Surgeon: Natacha Goyal MD;  Location: [facility]     ENT SURGERY      septal repair     LAPAROSCOPIC EVACUATION ECTOPIC PREGNANCY Left 1/28/2018    Procedure: LAPAROSCOPIC EVACUATION ECTOPIC PREGNANCY;  LAPAROSCOPIC SINGLE SITE EVACUATION OF ECTOPIC PREGNANCY, LEFT SALPINGECTOMY;  Surgeon: Erum Boles MD;  Location:  OR     LAPAROSCOPIC SALPINGECTOMY Left 1/28/2018    Procedure: LAPAROSCOPIC SALPINGECTOMY;;  Surgeon: Erum Boles MD;  Location:  OR     LAPAROSCOPIC SALPINGO-OOPHORECTOMY  11/2/2013    Procedure: LAPAROSCOPIC SALPINGO-OOPHORECTOMY;  Single port laparoscopic, Evacuation of hemo-peritonium and Products of Conception;  Surgeon: Pgegy Thornton MD;  Location:  OR     NOSE SURGERY      age 12             Social History:     Social History     Socioeconomic History     Marital status:      Spouse name: Not on file     Number of children: Not on file     Years of education: Not on file     Highest education level: Not on file   Occupational History     Not on file   Tobacco Use     Smoking status: Never     Smokeless tobacco: Never     Tobacco comments:      on occasion   Vaping Use     Vaping Use: Never used   Substance and Sexual Activity     Alcohol use: No     Alcohol/week: 0.0 standard drinks of alcohol     Comment: rare     Drug use: No     Sexual activity: Yes     Partners: Male   Other Topics Concern     Not on file   Social History Narrative     Not on file     Social Determinants of Health     Financial Resource Strain: Not on file   Food Insecurity: Not on file   Transportation Needs: Not on file   Physical Activity: Not on file   Stress: Not on file   Social Connections: Not on file   Interpersonal Safety: Not on file   Housing Stability: Not on file             Family History:   History reviewed. No pertinent family history.  "None          Allergies:    No Known Allergies          Medications:   Acute headache medications:  None    Preventative headache medications:  None    Current Outpatient Medications:      levothyroxine (SYNTHROID/LEVOTHROID) 150 MCG tablet, TAKE 1 TABLET BY MOUTH DAILY, Disp: 90 tablet, Rfl: 1     UNABLE TO FIND, MEDICATION NAME: IUD, Disp: , Rfl:           Physical Exam:   /85   Pulse 73   Ht 1.651 m (5' 5\")   Wt 117.9 kg (260 lb)   SpO2 100%   BMI 43.27 kg/m     Physical Exam:   Neurologic:   Mental Status Exam: Alert, awake and oriented to situation. No dysarthria. Speech of normal fluency.   Cranial Nerves: Fundoscopic exam with clear disc margins bilaterally. PERRLA, EOMs intact, no nystagmus, facial movements symmetric, facial sensation intact to light touch, hearing intact to conversation, trapezius and SCMs 5/5 bilaterally, tongue midline and fully mobile. No tongue atrophy.    Motor: Normal tone in all four extremities, no atrophy. 5/5 strength bilaterally in shoulder abduction, elbow extensors and flexors, wrist extensors and flexors, hip flexors, knee extensors and flexors, dorsi- and plantarflexion. No tremors or abnormal movements noted.   Sensory: Sensation intact to pinprick and vibration sensation on arms and legs bilaterally.    Coordination: Finger-nose-finger intact bilaterally.  Rapidly alternating movements intact bilaterally in the upper extremities.  Normal finger tapping bilaterally.  Intact heel-shin bilaterally. Normal Romberg.   Reflexes: 2+ and symmetric in triceps, biceps, brachioradialis, patellar, Achilles, and plantars downgoing bilaterally.   Gait: Normal gait.  Able to toe and heel walk.  Tandem gait normal.  Head: Normocephalic, atraumatic. No radiating pain with palpation over the supraorbital notches, occipital nerves.    Eyes: No conjunctival injection, no scleral icterus.           Data:     Narrative & Impression   Examination: 12/10/2020 8:13 AM  MRI of the brain " without and with intravenous contrast  MR venography without and with intravenous contrast     HISTORY: Venous sinus thrombosis, leptomeningeal enhancement.     COMPARISON: MRI/MRV 12/9/2020. CT venogram 10/17/2020.     TECHNIQUE: Multiplanar multisequence MRI of the brain without and with  intravenous contrast. Noncontrast two-dimensional time-of-flight and  three-dimensional contrast-enhanced MR venography were also performed.     FINDINGS: Since prior study, there has been improvement in the patency  of the previously thrombosed superior sagittal sinus, right transverse  sinus, sigmoid sinus, and particularly upper right internal jugular  vein. However, there is new stenosis of the upper right internal  jugular vein just inferior to the skull base. Dilated optic nerve  sheaths persist, with continued flattening of the posterior globes,  consistent with persistent increased intracranial pressure. No  intracranial mass, hydrocephalus, or restricted diffusion.                                                                      Impression: Improved patency of the superior sagittal, right  transverse, and right sigmoid sinuses, with persistent partially  occlusive thrombus. Persistent optic nerve sheath dilation and  posterior globes flattening suggesting continued high intracranial  pressure.     PAOLO FERNANDEZ MD   Again, thank you for allowing me to participate in the care of your patient.        Sincerely,        Caryn Kim MD

## 2024-01-24 ENCOUNTER — TELEPHONE (OUTPATIENT)
Dept: NEUROLOGY | Facility: CLINIC | Age: 44
End: 2024-01-24
Payer: COMMERCIAL

## 2024-01-24 NOTE — TELEPHONE ENCOUNTER
Spoke with patient and gave them the number for [facility] (central scheduling) to contact to schedule lab work as she is closer to [facility] and she works in [place].    Patient will let us know if questions or concerns arise.    Sasha Shepherd MA  [facility]

## 2024-01-24 NOTE — TELEPHONE ENCOUNTER
----- Message from Caryn Kim MD sent at 1/24/2024  1:24 PM CST -----  Thanks, it would just be the labs then that she also needs to schedule.  ----- Message -----  From: Sasha Norman CMA  Sent: 1/24/2024  11:47 AM CST  To: Caryn Kim MD    Imaging should be giving her a call to schedule those I believe.   ----- Message -----  From: Caryn Kim MD  Sent: 1/24/2024  11:35 AM CST  To: Sasha Norman CMA    I saw her yesterday, but clarified a point with her and need to order MRI brain/labs (before a CT head and neck angiogram is performed - it can be scheduled, but should be after those labs are back).  She is aware these will be ordered and expecting call for scheduling.    Thanks,  Caryn Kim

## 2024-01-26 ENCOUNTER — LAB (OUTPATIENT)
Dept: LAB | Facility: CLINIC | Age: 44
End: 2024-01-26
Payer: COMMERCIAL

## 2024-01-26 DIAGNOSIS — G44.219 EPISODIC TENSION-TYPE HEADACHE, NOT INTRACTABLE: ICD-10-CM

## 2024-01-26 LAB
BASOPHILS # BLD AUTO: 0 10E3/UL (ref 0–0.2)
BASOPHILS NFR BLD AUTO: 0 %
EOSINOPHIL # BLD AUTO: 0.2 10E3/UL (ref 0–0.7)
EOSINOPHIL NFR BLD AUTO: 3 %
ERYTHROCYTE [DISTWIDTH] IN BLOOD BY AUTOMATED COUNT: 12.7 % (ref 10–15)
HCT VFR BLD AUTO: 40 % (ref 35–47)
HGB BLD-MCNC: 13.2 G/DL (ref 11.7–15.7)
IMM GRANULOCYTES # BLD: 0 10E3/UL
IMM GRANULOCYTES NFR BLD: 0 %
LYMPHOCYTES # BLD AUTO: 1.7 10E3/UL (ref 0.8–5.3)
LYMPHOCYTES NFR BLD AUTO: 22 %
MCH RBC QN AUTO: 30.8 PG (ref 26.5–33)
MCHC RBC AUTO-ENTMCNC: 33 G/DL (ref 31.5–36.5)
MCV RBC AUTO: 93 FL (ref 78–100)
MONOCYTES # BLD AUTO: 0.4 10E3/UL (ref 0–1.3)
MONOCYTES NFR BLD AUTO: 6 %
NEUTROPHILS # BLD AUTO: 5.2 10E3/UL (ref 1.6–8.3)
NEUTROPHILS NFR BLD AUTO: 69 %
PLATELET # BLD AUTO: 334 10E3/UL (ref 150–450)
RBC # BLD AUTO: 4.29 10E6/UL (ref 3.8–5.2)
WBC # BLD AUTO: 7.5 10E3/UL (ref 4–11)

## 2024-01-26 PROCEDURE — 80053 COMPREHEN METABOLIC PANEL: CPT

## 2024-01-26 PROCEDURE — 85025 COMPLETE CBC W/AUTO DIFF WBC: CPT

## 2024-01-26 PROCEDURE — 36415 COLL VENOUS BLD VENIPUNCTURE: CPT

## 2024-01-27 LAB
ALBUMIN SERPL BCG-MCNC: 4.1 G/DL (ref 3.5–5.2)
ALP SERPL-CCNC: 62 U/L (ref 40–150)
ALT SERPL W P-5'-P-CCNC: 14 U/L (ref 0–50)
ANION GAP SERPL CALCULATED.3IONS-SCNC: 9 MMOL/L (ref 7–15)
AST SERPL W P-5'-P-CCNC: 17 U/L (ref 0–45)
BILIRUB SERPL-MCNC: 0.4 MG/DL
BUN SERPL-MCNC: 14.3 MG/DL (ref 6–20)
CALCIUM SERPL-MCNC: 9.2 MG/DL (ref 8.6–10)
CHLORIDE SERPL-SCNC: 105 MMOL/L (ref 98–107)
CREAT SERPL-MCNC: 0.85 MG/DL (ref 0.51–0.95)
DEPRECATED HCO3 PLAS-SCNC: 27 MMOL/L (ref 22–29)
EGFRCR SERPLBLD CKD-EPI 2021: 88 ML/MIN/1.73M2
GLUCOSE SERPL-MCNC: 76 MG/DL (ref 70–99)
POTASSIUM SERPL-SCNC: 4 MMOL/L (ref 3.4–5.3)
PROT SERPL-MCNC: 7.1 G/DL (ref 6.4–8.3)
SODIUM SERPL-SCNC: 141 MMOL/L (ref 135–145)

## 2024-02-01 ENCOUNTER — TELEPHONE (OUTPATIENT)
Dept: NEUROLOGY | Facility: CLINIC | Age: 44
End: 2024-02-01
Payer: COMMERCIAL

## 2024-02-01 NOTE — TELEPHONE ENCOUNTER
[facility] Call Center    Phone Message    May a detailed message be left on voicemail: no     Reason for Call: Order(s): Other:     Reason for requested: [facility]  [address], [address] [place] [address]     Has sent new CT orders that the radiologist is recommending to have done and is asking to have orders signed before Pt appt on 2/02 at 7am. Orders were sent to clinic by Imaging.     Please call Anu at [phone] for further instructions     Date needed: ASAP   Provider name: Caryn Kim     Action Taken: Message routed to:  Other: [facility]     Travel Screening: Not Applicable

## 2024-02-02 ENCOUNTER — ANCILLARY PROCEDURE (OUTPATIENT)
Dept: CT IMAGING | Facility: CLINIC | Age: 44
End: 2024-02-02
Attending: PSYCHIATRY & NEUROLOGY
Payer: COMMERCIAL

## 2024-02-02 ENCOUNTER — ANCILLARY PROCEDURE (OUTPATIENT)
Dept: MRI IMAGING | Facility: CLINIC | Age: 44
End: 2024-02-02
Attending: PSYCHIATRY & NEUROLOGY
Payer: COMMERCIAL

## 2024-02-02 DIAGNOSIS — G44.219 EPISODIC TENSION-TYPE HEADACHE, NOT INTRACTABLE: ICD-10-CM

## 2024-02-02 PROCEDURE — 70553 MRI BRAIN STEM W/O & W/DYE: CPT

## 2024-02-02 PROCEDURE — 255N000002 HC RX 255 OP 636: Performed by: PSYCHIATRY & NEUROLOGY

## 2024-02-02 PROCEDURE — 70450 CT HEAD/BRAIN W/O DYE: CPT | Mod: XU

## 2024-02-02 PROCEDURE — 70496 CT ANGIOGRAPHY HEAD: CPT

## 2024-02-02 PROCEDURE — A9585 GADOBUTROL INJECTION: HCPCS | Performed by: PSYCHIATRY & NEUROLOGY

## 2024-02-02 PROCEDURE — 250N000011 HC RX IP 250 OP 636: Performed by: PSYCHIATRY & NEUROLOGY

## 2024-02-02 PROCEDURE — 250N000009 HC RX 250: Performed by: PSYCHIATRY & NEUROLOGY

## 2024-02-02 RX ORDER — GADOBUTROL 604.72 MG/ML
10 INJECTION INTRAVENOUS ONCE
Status: COMPLETED | OUTPATIENT
Start: 2024-02-02 | End: 2024-02-02

## 2024-02-02 RX ORDER — IOPAMIDOL 755 MG/ML
90 INJECTION, SOLUTION INTRAVASCULAR ONCE
Status: COMPLETED | OUTPATIENT
Start: 2024-02-02 | End: 2024-02-02

## 2024-02-02 RX ADMIN — SODIUM CHLORIDE 70 ML: 9 INJECTION, SOLUTION INTRAVENOUS at 07:23

## 2024-02-02 RX ADMIN — GADOBUTROL 10 ML: 604.72 INJECTION INTRAVENOUS at 06:50

## 2024-02-02 RX ADMIN — IOPAMIDOL 90 ML: 755 INJECTION, SOLUTION INTRAVENOUS at 07:22

## 2024-02-05 NOTE — RESULT ENCOUNTER NOTE
Reviewed the CT head neck angiogram, MRI brain and CT head and noted chronic thrombus with mild stenosis involving the superior sagittal sinus. Chronic thrombus can persist after initial treatment with anticoagulation has prevented propagation in the acute phase, but this is evidence of the old clot. I will review these findings with her and discuss perhaps reviewing with Dr. Earl if needed.

## 2024-02-06 ENCOUNTER — TELEPHONE (OUTPATIENT)
Dept: NEUROLOGY | Facility: CLINIC | Age: 44
End: 2024-02-06

## 2024-02-06 NOTE — TELEPHONE ENCOUNTER
----- Message from Caryn Kim MD sent at 2024  9:09 AM CST -----  Regarding: FW: Peer to peer review request  Can we please find out why radiology needed this scan? They placed the order for me to sign so presumably it was to rule out metal prior to the MRI?  ----- Message -----  From: Amanda King  Sent: 2024   8:09 AM CST  To: Caryn Kim MD  Subject: Peer to peer review request                      Central [place] Denial Notification    Patient Name:  Paulina Diana  :    1980  MRN:    [id number]    Dr. Kim,    The authorization for procedure CT head on date of service 2024 has been denied by the patient's insurance for the following reason:    Denial Reason: This service(s) or item(s) is not approved because it does not meet the criteria for medical   necessity. Based on the information provided by the requesting practitioner, we have determined   that the service(s) is not medically necessary because:  Your doctor told us that there may be a concern related to your nervous system. This is a   network of nerve cells and fibers that send nerve messages between parts of your body. An MRI   (magnetic resonance imaging) scan has already been approved for you. A separate approval letter   has been sent to you. We need results of the prior approved scan that show the need for repeat   imaging or your doctor must confirm that the prior approved MRI scan was not performed and   will not be performed. The notes sent from your doctor did not provide these details.   This finding was based on review of [employer] Head Imaging Guidelines Section(s): Stroke/TIA   (HD 21.1) and Preface to the Imaging Guidelines, section Preface-3.1 Clinical Information.   Medical necessity is defined as a need for particular services or supplies that a provider   exercising prudent clinical judgment, would provide to a patient for the purpose of preventing,   evaluating, diagnosing or treating  an illness, injury, disease or its symptoms and that are:  ? in accordance with generally accepted standards of medical practice;  ? clinically appropriate, in terms of type, frequency, extent, site and duration, and   considered effective for the patient's illness, injury or disease; and  ? not primarily for the convenience of the patient, physician, or other health care provider,   and not more costly than an alternative service or sequence of services at least as likely to   produce equivalent therapeutic or diagnostic results as to the diagnosis or treatment of   that patient's illness, injury or disease.  As stated in your plan document, your coverage provides benefits for only those covered   services, drugs, and supplies that are medically necessary and appropriate for the diagnosis or   treatment of a specific illness, injury, or condition. No benefits will be provided unless it is   determined that the service or supply is medically necessary and appropriate.    Below is the information we provided to the patient's insurance company:    Order:    Ct head  Visit Notes:   Ov 01/23/2024  Results:   MRV 10/13/2023  Other:    N/A    A [id number] review can be done by calling the insurance/third party authorization vendor with the following information:    Insurance: [employer]  Auth Vendor:  [employer]  Phone #: [phone]  Due Date: asap    Patient ID: [id number]   Case/Ref #: [id number]     Please let us know how you wish to proceed as soon as possible. We may contact the patient if we do not receive a response.    Thank you,    Bhavani Quinonez Prior Authorization

## 2024-02-06 NOTE — TELEPHONE ENCOUNTER
Spoke with Anu at [phone].  Less than 7 days  - 7 + days is needed for insurance lead times. They will work on this portion on the back-end.    Reason for imaging request: CTA head without contrast was needed for comparison since they did not have a recent one in the past. If they did the imaging with contrast and they did not have a comparison image then they would not be able to tell if a brain bleed was present or not.    Sasha Shepherd MA  [facility]

## 2024-02-08 ENCOUNTER — MYC MEDICAL ADVICE (OUTPATIENT)
Dept: FAMILY MEDICINE | Facility: CLINIC | Age: 44
End: 2024-02-08

## 2024-02-12 ENCOUNTER — OFFICE VISIT (OUTPATIENT)
Dept: FAMILY MEDICINE | Facility: CLINIC | Age: 44
End: 2024-02-12

## 2024-02-12 VITALS
BODY MASS INDEX: 43.93 KG/M2 | OXYGEN SATURATION: 98 % | DIASTOLIC BLOOD PRESSURE: 86 MMHG | HEART RATE: 58 BPM | SYSTOLIC BLOOD PRESSURE: 136 MMHG | WEIGHT: 264 LBS

## 2024-02-12 DIAGNOSIS — K21.00 GASTROESOPHAGEAL REFLUX DISEASE WITH ESOPHAGITIS, UNSPECIFIED WHETHER HEMORRHAGE: ICD-10-CM

## 2024-02-12 DIAGNOSIS — R10.12 ABDOMINAL PAIN, LEFT UPPER QUADRANT: Primary | ICD-10-CM

## 2024-02-12 DIAGNOSIS — E66.01 MORBID OBESITY (H): ICD-10-CM

## 2024-02-12 PROCEDURE — 99213 OFFICE O/P EST LOW 20 MIN: CPT

## 2024-02-12 NOTE — PATIENT INSTRUCTIONS
Miralax cleanout for constipation    Take Dulcolax (bisacodyl) 1 hour before and 1 hour after drinking Gatorade-Miralax mixture  Mix Gatorade with appropriate dose of Miralax (Polyethylene glycol)  **see dosing table below**  Consume the Gatorade-Miralax mixture in 2-3 hours    Miralax-Gatorade dosing    Age Capfuls of Miralax Gatorade Volume Dulcolax   3-5 6 20 oz 5 mg   6-11 8-10 32 oz 5 mg   12+ 12-16 32 oz 10 mg     Adults: use 12+ dosing    If cleanout worked (no more chunks, only liquid), go to maintenance dose. If still having chunks or formed stool, repeat the above cleanout, but cut the number of capfuls in half BUT keep the volume of liquid the same.    Maintenance:      to 1 capful of Miralax once per day, adjust as needed to keep stools soft for a least 3-4 months.    Food Do s and Don ts to prevent/treat constipation     Decrease  Milk  Cheese or foods containing cheese (ex. mac and cheese, pizza)  Rice  Pasta  Jell-O  Apples  Apple sauce  Apple cider    No:  Fruits snacks  Fruit roll ups   Gummy bears   All contain gelatin which binds up poop    Increase:  Water 2L per day (68oz)   Raisins  Peaches  Pears  Grapes  Berries  Rhubarb  Oatmeal  Lettuce  Fiber snacks  Granola & granola bars  Nuts  Popcorn  Fiber bread  Metamucil cookies  Coconut oil / milk / syrup

## 2024-02-12 NOTE — PROGRESS NOTES
Assessment & Plan     Abdominal pain, left upper quadrant  Reviewed symptoms could be secondary to constipation. Recommendations include adding in Miralax. Side effects and dosing reviewed.  Discussed drinking plenty of fluids. Include high-fiber foods in your diet each day, these include fruits, vegetables, beans, and whole grains. May add in prune juice. Reviewed getting at least 30 minutes of exercise on most days of the week. Follow up as needed for new or worsening symptoms or if symptoms fail to improve. Red flags that warrant emergent evaluation discussed. Patient agreeable to plan. All questions answered.       Gastroesophageal reflux disease with esophagitis, unspecified whether hemorrhage  The pathophysiology of reflux is discussed.  Anti-reflux measures such as raising the head of the bed, avoiding tight clothing or belts, avoiding eating late at night and not lying down shortly after mealtime and achieving weight loss are discussed. Avoid ASA, NSAID's, caffeine, peppermints, alcohol and tobacco. OTC H2 blockers and/or antacids are often very helpful for PRN use. However, for chronic or frequent bothersome symptoms, prescription strength H2 blockers or a trial of PPI's should be used. I've explained that although PPI's are extremely effective, there is concern about rare but serious complications with long term use (C.diff, pneumonia, and atypical hip fractures). Further recommendations to her: medication side effects fully discussed, labs studies are not necessary at this time but would consider h pylori stool testing in the immed future if this were to recur. She should alert me if there are persistent symptoms, dysphagia, weight loss or GI bleeding. Follow up as needed if symptoms worsen or fail to improve. Patient agreeable to plan.   - omeprazole (PRILOSEC) 20 MG DR capsule  Dispense: 30 capsule; Refill: 0    Morbid obesity (H)  Discussed importance of a healthy weight, along with diet and  "exercise        BMI  Estimated body mass index is 43.93 kg/m  as calculated from the following:    Height as of 1/23/24: 1.651 m (5' 5\").    Weight as of this encounter: 119.7 kg (264 lb).   Weight management plan: Discussed healthy diet and exercise guidelines      Work on weight loss  Regular exercise  See Patient Instructions    Return if symptoms worsen or fail to improve, for Follow up.    Haylee Gallardo is a 43 year old, presenting for the following health issues:  Gastrophageal Reflux (Stomach feels very full from yesterday - \"has not drained\")    HPI       GERD/Heartburn  Onset/Duration: Last week had a bout. Then worse yesterday after eating. Nausea. Achy feeling in throat. Taking omeprazole  Hx of hiatal hernia and gastroparesis.  Description: Gas bubble moving up in stomach, burping, food sitting in stomach \"like a bowling ball\": was able to spend 1.5 hours on the elliptical and it has not been moving.   Intensity: moderate, 5/10. Worse when she presses on it. Under breast on the left side  Progression of Symptoms: worsening and waxing and waning  Accompanying Signs & Symptoms:  Does it feel like food gets stuck or trouble swallowing: YES- from hiatal hernia at baseline  Nausea: YES  Vomiting (bloody?): No  Abdominal Pain: YES- Left upper side  Black-Tarry stools: No  Bloody stools: No  History:  Previous similar episodes: YES- but not this severe. Hx of acid reflux  Previous ulcers: YES- stomach ulcer 10-15 years ago  Precipitating factors:   Caffeine use: YES- 1 soda a day (diet pepsi)  Alcohol use: No  NSAID/Aspirin use: YES- maybe once month  Tobacco use: No  Worse with starchy food-bread/rice  Alleviating factors: None  Therapies tried and outcome:             Lifestyle changes: exercise everyday            Medications: Omeprazole (Prilosec), antacids, Gaviscon- aluminium hydroxide and magnesium carbonate-helped in the past but not now.    Not passing gas. Last BM Saturday morning    Review of " Systems  Constitutional, neuro, ENT, endocrine, pulmonary, cardiac, gastrointestinal, genitourinary, musculoskeletal, integument and psychiatric systems are negative, except as otherwise noted.      Objective    /86   Pulse 58   Wt 119.7 kg (264 lb)   SpO2 98%   BMI 43.93 kg/m    Body mass index is 43.93 kg/m .  Physical Exam   GENERAL: alert and no distress  RESP: lungs clear to auscultation - no rales, rhonchi or wheezes  CV: regular rate and rhythm, normal S1 S2, no S3 or S4, no murmur, click or rub, no peripheral edema  ABDOMEN: tenderness LLQ and bowel sounds normal  PSYCH: mentation appears normal, affect normal/bright          Signed Electronically by: ABDON Lujan CNP

## 2024-03-19 DIAGNOSIS — E03.9 ACQUIRED HYPOTHYROIDISM: ICD-10-CM

## 2024-03-19 DIAGNOSIS — R10.12 ABDOMINAL PAIN, LEFT UPPER QUADRANT: ICD-10-CM

## 2024-03-19 DIAGNOSIS — K21.00 GASTROESOPHAGEAL REFLUX DISEASE WITH ESOPHAGITIS, UNSPECIFIED WHETHER HEMORRHAGE: ICD-10-CM

## 2024-03-19 RX ORDER — LEVOTHYROXINE SODIUM 150 UG/1
150 TABLET ORAL DAILY
Qty: 90 TABLET | Refills: 1 | Status: SHIPPED | OUTPATIENT
Start: 2024-03-19 | End: 2024-06-17

## 2024-03-19 NOTE — TELEPHONE ENCOUNTER
Med: omeprazole     LOV (related): 2/12/24      Due for F/U around: as needed     Next Appt: None

## 2024-04-25 DIAGNOSIS — K21.00 GASTROESOPHAGEAL REFLUX DISEASE WITH ESOPHAGITIS, UNSPECIFIED WHETHER HEMORRHAGE: ICD-10-CM

## 2024-04-25 DIAGNOSIS — R10.12 ABDOMINAL PAIN, LEFT UPPER QUADRANT: ICD-10-CM

## 2024-04-29 ASSESSMENT — HEADACHE IMPACT TEST (HIT 6)
HOW OFTEN HAVE YOU FELT TOO TIRED TO WORK BECAUSE OF YOUR HEADACHES: NEVER
HIT6 TOTAL SCORE: 42
HOW OFTEN HAVE YOU FELT FED UP OR IRRITATED BECAUSE OF YOUR HEADACHES: NEVER
HOW OFTEN DO HEADACHES LIMIT YOUR DAILY ACTIVITIES: NEVER
WHEN YOU HAVE A HEADACHE HOW OFTEN DO YOU WISH YOU COULD LIE DOWN: RARELY
WHEN YOU HAVE HEADACHES HOW OFTEN IS THE PAIN SEVERE: RARELY
HOW OFTEN DID HEADACHS LIMIT CONCENTRATION ON WORK OR DAILY ACTIVITY: RARELY

## 2024-04-29 ASSESSMENT — MIGRAINE DISABILITY ASSESSMENT (MIDAS)
HOW MANY DAYS DID YOU NOT DO HOUSEWORK BECAUSE OF HEADACHES: 0
HOW MANY DAYS DID YOU MISS WORK OR SCHOOL BECAUSE OF HEADACHES: 0
HOW MANY DAYS WAS YOUR PRODUCTIVITY CUT IN HALF BECAUSE OF HEADACHES: 0
ON A SCALE FROM 0-10 ON AVERAGE HOW PAINFUL WERE HEADACHES: 4
TOTAL SCORE: 0
HOW MANY DAYS IN THE PAST 3 MONTHS HAVE YOU HAD A HEADACHE: 1
HOW OFTEN WERE SOCIAL ACTIVITIES MISSED DUE TO HEADACHES: 0
HOW MANY DAYS WAS HOUSEWORK PRODUCTIVITY CUT IN HALF DUE TO HEADACHES: 0

## 2024-04-30 ENCOUNTER — OFFICE VISIT (OUTPATIENT)
Dept: NEUROLOGY | Facility: CLINIC | Age: 44
End: 2024-04-30
Payer: COMMERCIAL

## 2024-04-30 VITALS
WEIGHT: 265 LBS | HEART RATE: 81 BPM | OXYGEN SATURATION: 99 % | BODY MASS INDEX: 44.15 KG/M2 | SYSTOLIC BLOOD PRESSURE: 133 MMHG | DIASTOLIC BLOOD PRESSURE: 89 MMHG | HEIGHT: 65 IN

## 2024-04-30 DIAGNOSIS — G44.219 EPISODIC TENSION-TYPE HEADACHE, NOT INTRACTABLE: Primary | ICD-10-CM

## 2024-04-30 PROCEDURE — 99214 OFFICE O/P EST MOD 30 MIN: CPT | Performed by: PSYCHIATRY & NEUROLOGY

## 2024-04-30 NOTE — NURSING NOTE
"Paulina Diana is a 43 year old female who presents for:  Chief Complaint   Patient presents with    Headache        Initial Vitals:  /89   Pulse 81   Ht 1.651 m (5' 5\")   Wt 120.2 kg (265 lb)   SpO2 99%   BMI 44.10 kg/m   Estimated body mass index is 44.1 kg/m  as calculated from the following:    Height as of this encounter: 1.651 m (5' 5\").    Weight as of this encounter: 120.2 kg (265 lb).. Body surface area is 2.35 meters squared. BP completed using cuff size: X-large    Sasha Norman CMA    "

## 2024-04-30 NOTE — LETTER
4/30/2024         RE: Paulina Diana  [address] [facility] [address]        Dear Colleague,    Thank you for referring your patient, Paulina Diana, to the [facility] NEUROLOGY CLINICS [facility] [place]. Please see a copy of my visit note below.    We met today to review Ms. Diana tension headache treatment plan.    Recall, Ms. Diana is a 43 year old female and prothrombin and MTHFR coheterozygote, off warfarin with cerebral venous sinus thrombosis history.  CVST was thought to have been provoked by a prothrombotic state in the setting of ectopic pregnancy with methotrexate use. She stopped taking warfarin in June 2022 and stopped aspirin due to recurrent epistaxis. Headaches of her current presentation have not been as severe as when she had a CVST and she understands to report to emergency department if her headache is as severe again.     Prior to my evaluation, she was having pressure in the left temple and bifrontal. Was a 2-5/10 in severity.  She was having headaches once per week.    She tried acupuncture and that have twinges of pain and has not had a headache since then until yesterday.    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 She did take ibuprofen 800mg and 6 hours later took another 400mg.  She has PTSD due to concern for worsening of the headaches and CVST in prior history.     The massage therapy has been the most helpful addition so far. She has this performed every 2 weeks. She did not start the cognitive behavioral therapy.     She did fill the naproxen and realized that it makes her feel sleepy. It took the pain away.     She will continued with that as her headache abortive.    Will see me in return in 6 months.    Total time was 34 minutes.      Again, thank you for allowing me to participate in the care of your patient.        Sincerely,        Caryn Kim MD

## 2024-04-30 NOTE — PROGRESS NOTES
We met today to review Ms. Diana tension headache treatment plan.    Recall, Ms. Diana is a 43 year old female and prothrombin and MTHFR coheterozygote, off warfarin with cerebral venous sinus thrombosis history.  CVST was thought to have been provoked by a prothrombotic state in the setting of ectopic pregnancy with methotrexate use. She stopped taking warfarin in June 2022 and stopped aspirin due to recurrent epistaxis. Headaches of her current presentation have not been as severe as when she had a CVST and she understands to report to emergency department if her headache is as severe again.     Prior to my evaluation, she was having pressure in the left temple and bifrontal. Was a 2-5/10 in severity.  She was having headaches once per week.    She tried acupuncture and that have twinges of pain and has not had a headache since then until yesterday.    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 She did take ibuprofen 800mg and 6 hours later took another 400mg.  She has PTSD due to concern for worsening of the headaches and CVST in prior history.     The massage therapy has been the most helpful addition so far. She has this performed every 2 weeks. She did not start the cognitive behavioral therapy.     She did fill the naproxen and realized that it makes her feel sleepy. It took the pain away.     She will continued with that as her headache abortive.    Will see me in return in 6 months.    Total time was 34 minutes.

## 2024-05-06 NOTE — DISCHARGE INSTRUCTIONS
Instructions and warnings given and stressed- increased pain,SOB,CP, dizzieness or heavy vaginal bleeding.  F/U clinic 1 week per Obgyn.    150

## 2024-06-02 DIAGNOSIS — K21.00 GASTROESOPHAGEAL REFLUX DISEASE WITH ESOPHAGITIS, UNSPECIFIED WHETHER HEMORRHAGE: ICD-10-CM

## 2024-06-02 DIAGNOSIS — R10.12 ABDOMINAL PAIN, LEFT UPPER QUADRANT: ICD-10-CM

## 2024-06-10 ENCOUNTER — OFFICE VISIT (OUTPATIENT)
Dept: FAMILY MEDICINE | Facility: CLINIC | Age: 44
End: 2024-06-10

## 2024-06-10 VITALS
SYSTOLIC BLOOD PRESSURE: 137 MMHG | OXYGEN SATURATION: 98 % | HEART RATE: 67 BPM | DIASTOLIC BLOOD PRESSURE: 57 MMHG | HEIGHT: 66 IN | WEIGHT: 282.2 LBS | BODY MASS INDEX: 45.35 KG/M2

## 2024-06-10 DIAGNOSIS — Z23 NEED FOR VACCINATION: ICD-10-CM

## 2024-06-10 DIAGNOSIS — K31.84 GASTROPARESIS: ICD-10-CM

## 2024-06-10 DIAGNOSIS — E03.9 ACQUIRED HYPOTHYROIDISM: ICD-10-CM

## 2024-06-10 DIAGNOSIS — E66.01 MORBID OBESITY (H): Primary | ICD-10-CM

## 2024-06-10 LAB
T4 FREE SERPL-MCNC: 0.96 NG/DL (ref 0.9–1.7)
TSH SERPL DL<=0.005 MIU/L-ACNC: 7.21 UIU/ML (ref 0.3–4.2)

## 2024-06-10 PROCEDURE — 36415 COLL VENOUS BLD VENIPUNCTURE: CPT | Performed by: FAMILY MEDICINE

## 2024-06-10 PROCEDURE — 84443 ASSAY THYROID STIM HORMONE: CPT | Performed by: FAMILY MEDICINE

## 2024-06-10 PROCEDURE — 84439 ASSAY OF FREE THYROXINE: CPT | Performed by: FAMILY MEDICINE

## 2024-06-10 PROCEDURE — 90715 TDAP VACCINE 7 YRS/> IM: CPT | Performed by: FAMILY MEDICINE

## 2024-06-10 PROCEDURE — 99213 OFFICE O/P EST LOW 20 MIN: CPT | Mod: 25 | Performed by: FAMILY MEDICINE

## 2024-06-10 PROCEDURE — 90471 IMMUNIZATION ADMIN: CPT | Performed by: FAMILY MEDICINE

## 2024-06-10 NOTE — PROGRESS NOTES
"Had a cva a few years ago.  Works with vascular doc.  BMI is 46  Never tried SGLP yet  Pizza or red sauce reaally bothers her.  Has gastroparesis so SGLP might not be a great idea.  ROS:  General and Resp. completed and negative except as noted above    Histories: reviewed    OBJECTIVE:                                                    /57   Pulse 67   Ht 1.664 m (5' 5.5\")   Wt 128 kg (282 lb 3.2 oz)   SpO2 98%   BMI 46.25 kg/m    Body mass index is 46.25 kg/m .   APPEARANCE: = Relaxed and in no distress  Thyroid = Not enlarged and no masses felt  Resp effort = Calm regular breathing  Heart Rate, Rhythm, & sounds (no Murm)  = Regular rate and rhythm with no S3, S4, or murmur appreciated.     ASSESSMENT/PLAN:                                                    Quality gaps reviewed    Paulina was seen today for weight loss.    Diagnoses and all orders for this visit:  Long discussion about next steps.    Gastroparesis and Morbid obesity (H)  Would benefit from specialty consult to navigate through best option.  -     Adult Comprehensive Weight Management  Referral - To a [facility] Location (Use POS/Location); Future    Acquired hypothyroidism  Discussed signs and symptoms of hypo and hyperthyroidism.  Reviewed treatment options.   Recommended absolute medication compliance to avoid adding any additionial variance to the labs.    -     VENOUS COLLECTION  -     TSH; Future  -     T4 FREE; Future    Need for vaccination  -     VACCINE ADMINISTRATION, INITIAL  -     TDAP 7+ (ADACEL,BOOSTRIX)            Work on weight loss    Health maintenance items are reviewed in Epic and correct as of today:    Ismael Ribera MD  [facility]  Family Practice  [facility]  [phone]    For any issues my office # is [phone]          Answers submitted by the patient for this visit:  General Questionnaire (Submitted on 6/9/2024)  Chief Complaint: Chronic problems general " questions HPI Form  What is the reason for your visit today? : Talk about wegovy  How many servings of fruits and vegetables do you eat daily?: 0-1  On average, how many sweetened beverages do you drink each day (Examples: soda, juice, sweet tea, etc.  Do NOT count diet or artificially sweetened beverages)?: 2  How many minutes a day do you exercise enough to make your heart beat faster?: 10 to 19  How many days a week do you exercise enough to make your heart beat faster?: 3 or less  How many days per week do you miss taking your medication?: 1  What makes it hard for you to take your medication every day?: remembering to take

## 2024-06-17 ENCOUNTER — TELEPHONE (OUTPATIENT)
Dept: FAMILY MEDICINE | Facility: CLINIC | Age: 44
End: 2024-06-17

## 2024-06-17 DIAGNOSIS — E03.9 ACQUIRED HYPOTHYROIDISM: ICD-10-CM

## 2024-06-17 RX ORDER — LEVOTHYROXINE SODIUM 175 UG/1
175 TABLET ORAL DAILY
Qty: 90 TABLET | Refills: 0 | Status: SHIPPED | OUTPATIENT
Start: 2024-06-17 | End: 2024-09-24

## 2024-06-17 NOTE — TELEPHONE ENCOUNTER
Called and spoke with patient regarding lab results per Dr Ribera. Patient agrees and will increase levothyroxine dose to 175mcg. She is asked to return to clinic in 6-8 weeks to recheck TSH. Prescription sent to pharmacy and future lab order entered. Xena Pineda

## 2024-06-17 NOTE — TELEPHONE ENCOUNTER
----- Message from Ismael Ribera MD sent at 6/12/2024  4:52 PM CDT -----  Can we please increase her sythroid to 175 mcg daily?  Thanks,  KN

## 2024-06-20 ENCOUNTER — TRANSFERRED RECORDS (OUTPATIENT)
Dept: FAMILY MEDICINE | Facility: CLINIC | Age: 44
End: 2024-06-20

## 2024-06-20 DIAGNOSIS — E66.9 OBESITY: Primary | ICD-10-CM

## 2024-06-21 ENCOUNTER — LAB (OUTPATIENT)
Dept: LAB | Facility: CLINIC | Age: 44
End: 2024-06-21
Payer: COMMERCIAL

## 2024-06-21 DIAGNOSIS — E66.9 OBESITY: ICD-10-CM

## 2024-06-21 LAB
ALBUMIN SERPL BCG-MCNC: 3.9 G/DL (ref 3.5–5.2)
ALP SERPL-CCNC: 63 U/L (ref 40–150)
ALT SERPL W P-5'-P-CCNC: 11 U/L (ref 0–50)
ANION GAP SERPL CALCULATED.3IONS-SCNC: 9 MMOL/L (ref 7–15)
APTT PPP: 31 SECONDS (ref 22–38)
AST SERPL W P-5'-P-CCNC: 19 U/L (ref 0–45)
BILIRUB SERPL-MCNC: 0.4 MG/DL
BUN SERPL-MCNC: 11.2 MG/DL (ref 6–20)
CA-I BLD-MCNC: 4.7 MG/DL (ref 4.4–5.2)
CALCIUM SERPL-MCNC: 8.8 MG/DL (ref 8.6–10)
CHLORIDE SERPL-SCNC: 107 MMOL/L (ref 98–107)
CHOLEST SERPL-MCNC: 155 MG/DL
CREAT SERPL-MCNC: 0.82 MG/DL (ref 0.51–0.95)
DEPRECATED HCO3 PLAS-SCNC: 25 MMOL/L (ref 22–29)
EGFRCR SERPLBLD CKD-EPI 2021: >90 ML/MIN/1.73M2
ERYTHROCYTE [DISTWIDTH] IN BLOOD BY AUTOMATED COUNT: 12.9 % (ref 10–15)
FASTING STATUS PATIENT QL REPORTED: YES
FASTING STATUS PATIENT QL REPORTED: YES
FERRITIN SERPL-MCNC: 40 NG/ML (ref 6–175)
FOLATE SERPL-MCNC: 10.1 NG/ML (ref 4.6–34.8)
GLUCOSE SERPL-MCNC: 87 MG/DL (ref 70–99)
HBA1C MFR BLD: 5.2 % (ref 0–5.6)
HCT VFR BLD AUTO: 39.4 % (ref 35–47)
HDLC SERPL-MCNC: 48 MG/DL
HGB BLD-MCNC: 13.1 G/DL (ref 11.7–15.7)
INR PPP: 0.93 (ref 0.85–1.15)
IRON BINDING CAPACITY (ROCHE): 284 UG/DL (ref 240–430)
IRON SATN MFR SERPL: 20 % (ref 15–46)
IRON SERPL-MCNC: 56 UG/DL (ref 37–145)
LDLC SERPL CALC-MCNC: 96 MG/DL
Lab: NORMAL
MCH RBC QN AUTO: 31 PG (ref 26.5–33)
MCHC RBC AUTO-ENTMCNC: 33.2 G/DL (ref 31.5–36.5)
MCV RBC AUTO: 93 FL (ref 78–100)
NONHDLC SERPL-MCNC: 107 MG/DL
PERFORMING LABORATORY: NORMAL
PLATELET # BLD AUTO: 310 10E3/UL (ref 150–450)
POTASSIUM SERPL-SCNC: 4.2 MMOL/L (ref 3.4–5.3)
PROT SERPL-MCNC: 6.9 G/DL (ref 6.4–8.3)
PTH-INTACT SERPL-MCNC: 26 PG/ML (ref 15–65)
RBC # BLD AUTO: 4.22 10E6/UL (ref 3.8–5.2)
SODIUM SERPL-SCNC: 141 MMOL/L (ref 135–145)
SPECIMEN STATUS: NORMAL
TEST NAME: NORMAL
TRANSFERRIN SERPL-MCNC: 258 MG/DL (ref 200–360)
TRIGL SERPL-MCNC: 57 MG/DL
TSH SERPL DL<=0.005 MIU/L-ACNC: 2.09 UIU/ML (ref 0.3–4.2)
VIT B12 SERPL-MCNC: 298 PG/ML (ref 232–1245)
WBC # BLD AUTO: 6.7 10E3/UL (ref 4–11)

## 2024-06-21 PROCEDURE — 99000 SPECIMEN HANDLING OFFICE-LAB: CPT

## 2024-06-21 PROCEDURE — 84466 ASSAY OF TRANSFERRIN: CPT

## 2024-06-21 PROCEDURE — 82728 ASSAY OF FERRITIN: CPT

## 2024-06-21 PROCEDURE — 85027 COMPLETE CBC AUTOMATED: CPT

## 2024-06-21 PROCEDURE — 82306 VITAMIN D 25 HYDROXY: CPT

## 2024-06-21 PROCEDURE — 83970 ASSAY OF PARATHORMONE: CPT

## 2024-06-21 PROCEDURE — 80053 COMPREHEN METABOLIC PANEL: CPT

## 2024-06-21 PROCEDURE — 83036 HEMOGLOBIN GLYCOSYLATED A1C: CPT

## 2024-06-21 PROCEDURE — 84425 ASSAY OF VITAMIN B-1: CPT | Mod: 90

## 2024-06-21 PROCEDURE — 85610 PROTHROMBIN TIME: CPT

## 2024-06-21 PROCEDURE — 84443 ASSAY THYROID STIM HORMONE: CPT

## 2024-06-21 PROCEDURE — 80061 LIPID PANEL: CPT

## 2024-06-21 PROCEDURE — 83921 ORGANIC ACID SINGLE QUANT: CPT

## 2024-06-21 PROCEDURE — 82746 ASSAY OF FOLIC ACID SERUM: CPT

## 2024-06-21 PROCEDURE — 36415 COLL VENOUS BLD VENIPUNCTURE: CPT

## 2024-06-21 PROCEDURE — 85730 THROMBOPLASTIN TIME PARTIAL: CPT

## 2024-06-21 PROCEDURE — 82330 ASSAY OF CALCIUM: CPT

## 2024-06-21 PROCEDURE — 83540 ASSAY OF IRON: CPT

## 2024-06-21 PROCEDURE — 82607 VITAMIN B-12: CPT

## 2024-06-24 LAB — MISCELLANEOUS TEST 1 (ARUP): ABNORMAL

## 2024-06-26 LAB — VIT B1 PYROPHOSHATE BLD-SCNC: 109 NMOL/L

## 2024-06-30 LAB
DEPRECATED CALCIDIOL+CALCIFEROL SERPL-MC: <32 UG/L (ref 20–75)
VITAMIN D2 SERPL-MCNC: <5 UG/L
VITAMIN D3 SERPL-MCNC: 27 UG/L

## 2024-07-31 NOTE — PATIENT INSTRUCTIONS

## 2024-07-31 NOTE — PROGRESS NOTES
Preoperative Evaluation  [facility]  [address] [place] [address]  Phone: [phone]  Fax: [phone]  Primary Provider: Ismael Ribera MD  Pre-op Performing Provider: Ismael Ribera MD  Aug 5, 2024               8/4/2024   Surgical Information   What procedure is being done? Gastric bypass and hernia repair   Facility or Hospital where procedure[facility]   Who is doing the procedure / surgery? Dr garcia   Date of surgery / procedure: 8/21/24   Time of surgery / procedure: 7am   Where do you plan to recover after surgery? at home with family      Fax number for surgical facility: [phone] [place] location     Assessment & Plan     The proposed surgical procedure is considered INTERMEDIATE risk.    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    Haylee Gallardo is a 44 year old, presenting for the following:  Pre-Op Exam (Gastric bypass and hernia repair, 8/21/24 [facility] ) and Health Maintenance (Declines covid vaccine )        HPI related to upcoming procedure: Has gastic paresis and hiatal hernia that need repair with planned gastric bypass.        8/4/2024   Pre-Op Questionnaire   Have you ever had a heart attack or stroke? (!) YES    Have you ever had surgery on your heart or blood vessels, such as a stent placement, a coronary artery bypass, or surgery on an artery in your head, neck, heart, or legs? No  "  Do you have chest pain with activity? No   Do you have a history of heart failure? No   Do you currently have a cold, bronchitis or symptoms of other infection? No   Do you have a cough, shortness of breath, or wheezing? No   Do you or anyone in your family have previous history of blood clots? (!) YES pt    Do you or does anyone in your family have a serious bleeding problem such as prolonged bleeding following surgeries or cuts? No   Have you ever had problems with anemia or been told to take iron pills? (!) YES anemia    Have you had any abnormal blood loss such as black, tarry or bloody stools, or abnormal vaginal bleeding? No   Have you ever had a blood transfusion? No   Are you willing to have a blood transfusion if it is medically needed before, during, or after your surgery? Yes   Have you or any of your relatives ever had problems with anesthesia? No   Do you have sleep apnea, excessive snoring or daytime drowsiness? No   Do you have any artifical heart valves or other implanted medical devices like a pacemaker, defibrillator, or continuous glucose monitor? No   Do you have artificial joints? No   Are you allergic to latex? No      Health Care Directive  Patient does not have a Health Care Directive or Living Will:     Preoperative Review of    reviewed - no record of controlled substances prescribed.      Status of Chronic Conditions:  See problem list for active medical problems.  Problems all longstanding and stable, except as noted/documented.  See ROS for pertinent symptoms related to these conditions.    History of ectopic pregnancy and MXT related cerebral clot 4 years ago, now off of meds.    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 \"prothrombin and MTHFR coheterozygote, off warfarin with " "cerebral venous sinus thrombosis history.  CVST was thought to have been provoked by a prothrombotic state in the setting of ectopic pregnancy with methotrexate use. She stopped taking warfarin in June 2022 and stopped aspirin due to recurrent epistaxis.\"      Morbid obesity (H) 12/12/2019     Priority: Medium    Acquired hypothyroidism 02/24/2016     Priority: Medium    Atypical nevus 04/12/2011     Priority: Medium     Do you wish to do the replacement in the background? yes          Past Medical History:   Diagnosis Date    Ectopic pregnancy, tubal 1/28/2018    Thyroid disease     hypothyroid     Past Surgical History:   Procedure Laterality Date    DILATION AND CURETTAGE SUCTION N/A 5/27/2016    Procedure: DILATION AND CURETTAGE SUCTION;  Surgeon: Natacha Goyal MD;  Location: [facility]    ENT SURGERY      septal repair    LAPAROSCOPIC EVACUATION ECTOPIC PREGNANCY Left 1/28/2018    Procedure: LAPAROSCOPIC EVACUATION ECTOPIC PREGNANCY;  LAPAROSCOPIC SINGLE SITE EVACUATION OF ECTOPIC PREGNANCY, LEFT SALPINGECTOMY;  Surgeon: Erum Boles MD;  Location:  OR    LAPAROSCOPIC SALPINGECTOMY Left 1/28/2018    Procedure: LAPAROSCOPIC SALPINGECTOMY;;  Surgeon: Erum Boles MD;  Location:  OR    LAPAROSCOPIC SALPINGO-OOPHORECTOMY  11/2/2013    Procedure: LAPAROSCOPIC SALPINGO-OOPHORECTOMY;  Single port laparoscopic, Evacuation of hemo-peritonium and Products of Conception;  Surgeon: Peggy Thornton MD;  Location:  OR    NOSE SURGERY      age 12     Current Outpatient Medications   Medication Sig Dispense Refill    levothyroxine (SYNTHROID/LEVOTHROID) 175 MCG tablet Take 1 tablet (175 mcg) by mouth daily 90 tablet 0    omeprazole (PRILOSEC) 20 MG DR capsule TAKE 1 CAPSULE(20 MG) BY MOUTH DAILY 30 capsule 2    UNABLE TO FIND MEDICATION NAME: IUD      naproxen (NAPROSYN) 500 MG tablet Take 1 tablet (500 mg) by mouth 2 times daily (with meals) 15 tablet 3       No Known Allergies     Social " "History     Tobacco Use    Smoking status: Never    Smokeless tobacco: Never    Tobacco comments:      on occasion   Substance Use Topics    Alcohol use: No     Alcohol/week: 0.0 standard drinks of alcohol     Comment: rare     Family History   Problem Relation Age of Onset    No Known Problems Mother     Lymphoma Father         agent orange    Back Pain Brother      History   Drug Use No             Review of Systems  Constitutional, HEENT, cardiovascular, pulmonary, GI, , musculoskeletal, neuro, skin, endocrine and psych systems are negative, except as otherwise noted.    Objective    /80   Pulse 61   Ht 1.651 m (5' 5\")   Wt 124.7 kg (275 lb)   SpO2 97%   BMI 45.76 kg/m     Estimated body mass index is 45.76 kg/m  as calculated from the following:    Height as of this encounter: 1.651 m (5' 5\").    Weight as of this encounter: 124.7 kg (275 lb).  Physical Exam  GENERAL: alert, no distress, and obese  EYES: Eyes grossly normal to inspection, PERRL and conjunctivae and sclerae normal  HENT: ear canals and TM's normal, nose and mouth without ulcers or lesions  NECK: no adenopathy, no asymmetry, masses, or scars  RESP: lungs clear to auscultation - no rales, rhonchi or wheezes  CV: regular rate and rhythm, normal S1 S2, no S3 or S4, no murmur, click or rub, no peripheral edema  ABDOMEN: soft, nontender, no hepatosplenomegaly, no masses and bowel sounds normal  MS: no gross musculoskeletal defects noted, no edema  SKIN: no suspicious lesions or rashes  NEURO: Normal strength and tone, mentation intact and speech normal  PSYCH: mentation appears normal, affect normal/bright    Recent Labs   Lab Test 06/21/24  1422 01/26/24  1425   HGB 13.1 13.2    334   INR 0.93  --     141   POTASSIUM 4.2 4.0   CR 0.82 0.85   A1C 5.2  --         Diagnostics  No labs were ordered during this visit.   No EKG required, no history of coronary heart disease, significant arrhythmia, peripheral arterial " disease or other structural heart disease.    Revised Cardiac Risk Index (RCRI)  The patient has the following serious cardiovascular risks for perioperative complications:   - No serious cardiac risks = 0 points     RCRI Interpretation: 0 points: Class I (very low risk - 0.4% complication rate)       The longitudinal plan of care for the diagnosis(es)/condition(s) as documented were addressed during this visit. Due to the added complexity in care, I will continue to support Paulina in the subsequent management and with ongoing continuity of care.  Signed Electronically by: Ismael Ribera MD  A copy of this evaluation report is provided to the requesting physician.

## 2024-08-05 ENCOUNTER — OFFICE VISIT (OUTPATIENT)
Dept: FAMILY MEDICINE | Facility: CLINIC | Age: 44
End: 2024-08-05

## 2024-08-05 VITALS
HEIGHT: 65 IN | DIASTOLIC BLOOD PRESSURE: 80 MMHG | WEIGHT: 275 LBS | SYSTOLIC BLOOD PRESSURE: 130 MMHG | HEART RATE: 61 BPM | OXYGEN SATURATION: 97 % | BODY MASS INDEX: 45.82 KG/M2

## 2024-08-05 DIAGNOSIS — Z86.73 HISTORY OF CEREBRAL VENOUS INFARCTION ASSOCIATED WITH CEREBRAL SINOVENOUS THROMBOSIS: ICD-10-CM

## 2024-08-05 DIAGNOSIS — E03.9 ACQUIRED HYPOTHYROIDISM: ICD-10-CM

## 2024-08-05 DIAGNOSIS — Z01.818 PREOP GENERAL PHYSICAL EXAM: Primary | ICD-10-CM

## 2024-08-05 DIAGNOSIS — K31.84 GASTRIC PARESIS: ICD-10-CM

## 2024-08-05 DIAGNOSIS — E66.01 MORBID OBESITY (H): ICD-10-CM

## 2024-08-05 DIAGNOSIS — Z86.718 HISTORY OF CEREBRAL VENOUS INFARCTION ASSOCIATED WITH CEREBRAL SINOVENOUS THROMBOSIS: ICD-10-CM

## 2024-08-05 DIAGNOSIS — K44.9 HIATAL HERNIA: ICD-10-CM

## 2024-08-05 PROBLEM — D23.30: Status: RESOLVED | Noted: 2017-08-08 | Resolved: 2024-08-05

## 2024-08-05 PROBLEM — D22.9 MELANOCYTIC NEVUS, UNSPECIFIED LOCATION: Status: RESOLVED | Noted: 2017-08-08 | Resolved: 2024-08-05

## 2024-08-05 PROBLEM — Z79.01 ANTICOAGULATION MONITORING, INR RANGE 2-3: Status: RESOLVED | Noted: 2020-10-22 | Resolved: 2024-08-05

## 2024-08-05 PROBLEM — Z79.01 LONG TERM CURRENT USE OF ANTICOAGULANT THERAPY: Status: RESOLVED | Noted: 2020-10-22 | Resolved: 2024-08-05

## 2024-08-05 PROCEDURE — G2211 COMPLEX E/M VISIT ADD ON: HCPCS | Performed by: FAMILY MEDICINE

## 2024-08-05 PROCEDURE — 99214 OFFICE O/P EST MOD 30 MIN: CPT | Performed by: FAMILY MEDICINE

## 2024-08-07 NOTE — PROGRESS NOTES
8/5/24 faxed preop to Daphney ALCALA @ [phone]    Douglas Pennington,   [facility]  [phone]

## 2024-09-24 DIAGNOSIS — E03.9 ACQUIRED HYPOTHYROIDISM: ICD-10-CM

## 2024-09-24 DIAGNOSIS — R10.12 ABDOMINAL PAIN, LEFT UPPER QUADRANT: ICD-10-CM

## 2024-09-24 DIAGNOSIS — K21.00 GASTROESOPHAGEAL REFLUX DISEASE WITH ESOPHAGITIS, UNSPECIFIED WHETHER HEMORRHAGE: ICD-10-CM

## 2024-09-24 RX ORDER — LEVOTHYROXINE SODIUM 175 UG/1
175 TABLET ORAL DAILY
Qty: 90 TABLET | Refills: 0 | Status: SHIPPED | OUTPATIENT
Start: 2024-09-24

## 2024-11-04 ENCOUNTER — TRANSFERRED RECORDS (OUTPATIENT)
Dept: FAMILY MEDICINE | Facility: CLINIC | Age: 44
End: 2024-11-04

## 2024-11-04 ASSESSMENT — HEADACHE IMPACT TEST (HIT 6)
HOW OFTEN HAVE YOU FELT TOO TIRED TO WORK BECAUSE OF YOUR HEADACHES: RARELY
HOW OFTEN DID HEADACHS LIMIT CONCENTRATION ON WORK OR DAILY ACTIVITY: RARELY
WHEN YOU HAVE A HEADACHE HOW OFTEN DO YOU WISH YOU COULD LIE DOWN: RARELY
WHEN YOU HAVE HEADACHES HOW OFTEN IS THE PAIN SEVERE: NEVER
HOW OFTEN DO HEADACHES LIMIT YOUR DAILY ACTIVITIES: NEVER
HOW OFTEN HAVE YOU FELT FED UP OR IRRITATED BECAUSE OF YOUR HEADACHES: NEVER
HIT6 TOTAL SCORE: 42

## 2024-11-04 ASSESSMENT — MIGRAINE DISABILITY ASSESSMENT (MIDAS)
ON A SCALE FROM 0-10 ON AVERAGE HOW PAINFUL WERE HEADACHES: 8
HOW MANY DAYS IN THE PAST 3 MONTHS HAVE YOU HAD A HEADACHE: 1
HOW MANY DAYS WAS YOUR PRODUCTIVITY CUT IN HALF BECAUSE OF HEADACHES: 0
HOW MANY DAYS DID YOU MISS WORK OR SCHOOL BECAUSE OF HEADACHES: 0
HOW MANY DAYS DID YOU NOT DO HOUSEWORK BECAUSE OF HEADACHES: 0
TOTAL SCORE: 0
HOW OFTEN WERE SOCIAL ACTIVITIES MISSED DUE TO HEADACHES: 0
HOW MANY DAYS WAS HOUSEWORK PRODUCTIVITY CUT IN HALF DUE TO HEADACHES: 0

## 2024-11-05 ENCOUNTER — OFFICE VISIT (OUTPATIENT)
Dept: NEUROLOGY | Facility: CLINIC | Age: 44
End: 2024-11-05
Payer: COMMERCIAL

## 2024-11-05 VITALS
HEART RATE: 86 BPM | WEIGHT: 275 LBS | HEIGHT: 65 IN | OXYGEN SATURATION: 100 % | DIASTOLIC BLOOD PRESSURE: 77 MMHG | SYSTOLIC BLOOD PRESSURE: 126 MMHG | BODY MASS INDEX: 45.82 KG/M2

## 2024-11-05 DIAGNOSIS — G44.219 EPISODIC TENSION-TYPE HEADACHE, NOT INTRACTABLE: Primary | ICD-10-CM

## 2024-11-05 PROCEDURE — 99213 OFFICE O/P EST LOW 20 MIN: CPT | Performed by: PSYCHIATRY & NEUROLOGY

## 2024-11-05 NOTE — NURSING NOTE
"Paulina Diana is a 44 year old female who presents for:  Chief Complaint   Patient presents with    Headache     3 mo         Initial Vitals:  /77   Pulse 86   Ht 1.651 m (5' 5\")   Wt 124.7 kg (275 lb)   SpO2 100%   BMI 45.76 kg/m   Estimated body mass index is 45.76 kg/m  as calculated from the following:    Height as of this encounter: 1.651 m (5' 5\").    Weight as of this encounter: 124.7 kg (275 lb).. Body surface area is 2.39 meters squared. BP completed using cuff size: wallace Norman CMA    "

## 2024-11-05 NOTE — LETTER
11/5/2024      Paulina Diana  [address] [facility] [address]      Dear Colleague,    Thank you for referring your patient, Paulina Diana, to the [facility] NEUROLOGY CLINICS [facility]. Please see a copy of my visit note below.    [facility]    Headache Neurology Progress Note  November 5, 2024    Assessment/Plan:   Paulina Diana is a 44 year old female with episodic tension headaches which have responded well to treatment with massages every 2 weeks. She also has noticed benefit with bariatric surgery.    Paulina Diana's headache disorder has remained stable on their current regimen. No additional changes are recommended at this time. They may return to their primary care provider for continuing care and refills and return to the headache clinic as needed.    Their currently successful headache treatment plan includes:    Acute Treatment:  -For acute treatment of mild headache, take acetaminophen as needed.     -If experiencing migrainous associated symptoms (photophobia, phonophobia, nausea or vomiting), should report to the ED for further evaluation. She does not have a migraine history and prior headache with this presentation was actually a cerebral venous sinus thrombosis, so a CT head venogram with contrast would be recommended initially. Reporting to the ED for such symptoms was discussed with the patient.     Preventive Treatment:  -For headache prevention, take massages twice per month.    No return visit at this time.    Please reach out with questions or concerns.    Total time spent today was 20 minutes.    Caryn Kim MD   Neurology     Subjective:    Paulina Diana returns for follow up of episodic tension headache.     She has only had 2 headaches and 1 was this weekend. She had one really bad one October 21st. She had bariatric surgery October 21st. She can no longer take NSAIDs. She took 3 tylenol now, sleep and water relieved the pain and sleep resolved  "it. That was a 7-8/10. She never got nauseous, but needed to lie down. She did sleep for about 2 hours. There was a sensation of waves and pressure like. She could not continue with physical activity.     No photophobia, phonophobia, nausea or vomiting.    She is getting the massages every 2 weeks and that has been helpful. She also does not have the kittens. After her surgery, she has had to hydrate every day. The PTSD is only an issue when it gets bad.  Acupuncture did not work.     Objective:    Vitals: /77   Pulse 86   Ht 1.651 m (5' 5\")   Wt 124.7 kg (275 lb)   SpO2 100%   BMI 45.76 kg/m    General: Cooperative, NAD  Neurologic:  Mental Status: Fully alert, attentive and oriented. Speech clear and fluent.   Cranial Nerves: Facial movements symmetric.   Motor: No abnormal movements.      Pertinent Investigations:          1/22/2024     5:17 PM 4/29/2024     6:09 PM 11/4/2024     8:16 PM   HIT-6   When you have headaches, how often is the pain severe 8  8  6    How often do headaches limit your ability to do usual daily activities including household work, work, school, or social activities? 6  6  6    When you have a headache, how often do you wish you could lie down? 8  8  8    In the past 4 weeks, how often have you felt too tired to do work or daily activities because of your headaches 6  6  8    In the past 4 weeks, how often have you felt fed up or irritated because of your headaches 8  6  6    In the past 4 weeks, how often did headaches limit your ability to concentrate on work or daily activities 8  8  8    HIT-6 Total Score 44 42 42        Patient-reported           1/22/2024     9:20 PM 4/29/2024     6:12 PM 11/4/2024     8:18 PM   MIDAS - in the past three months:   On how many days did you miss work or school because of your headaches? 0 0 0   How many days was your productivity at work or school reduced by half or more because of your headaches? 0 0 0   On how many days did you not do " household work because of your headaches? 0 0 0   How many days was your productivity in household work reduced by half or more because of your headaches? 0 0 0   On how many days did you miss family, social, or leisure activities because of your headaches? 0 0 0   On how many days did you have a headache? 0 1 1   On a scale of 0-10, on average how painful were these headaches? 3 4 8   MIDAS Score 0 (I - Little or No Disability) 0 (I - Little or No Disability) 0 (I - Little or No Disability)           Again, thank you for allowing me to participate in the care of your patient.        Sincerely,        Caryn Kim MD

## 2024-11-05 NOTE — PROGRESS NOTES
[facility]    Headache Neurology Progress Note  November 5, 2024    Assessment/Plan:   Paulina Diana is a 44 year old female with episodic tension headaches which have responded well to treatment with massages every 2 weeks. She also has noticed benefit with bariatric surgery.    Paulina Diana's headache disorder has remained stable on their current regimen. No additional changes are recommended at this time. They may return to their primary care provider for continuing care and refills and return to the headache clinic as needed.    Their currently successful headache treatment plan includes:    Acute Treatment:  -For acute treatment of mild headache, take acetaminophen as needed.     -If experiencing migrainous associated symptoms (photophobia, phonophobia, nausea or vomiting), should report to the ED for further evaluation. She does not have a migraine history and prior headache with this presentation was actually a cerebral venous sinus thrombosis, so a CT head venogram with contrast would be recommended initially. Reporting to the ED for such symptoms was discussed with the patient.     Preventive Treatment:  -For headache prevention, take massages twice per month.    No return visit at this time.    Please reach out with questions or concerns.    Total time spent today was 20 minutes.    Caryn Kim MD   Neurology     Subjective:    Paulina Diana returns for follow up of episodic tension headache.     She has only had 2 headaches and 1 was this weekend. She had one really bad one October 21st. She had bariatric surgery October 21st. She can no longer take NSAIDs. She took 3 tylenol now, sleep and water relieved the pain and sleep resolved it. That was a 7-8/10. She never got nauseous, but needed to lie down. She did sleep for about 2 hours. There was a sensation of waves and pressure like. She could not continue with physical activity.     No photophobia, phonophobia, nausea  "or vomiting.    She is getting the massages every 2 weeks and that has been helpful. She also does not have the kittens. After her surgery, she has had to hydrate every day. The PTSD is only an issue when it gets bad.  Acupuncture did not work.     Objective:    Vitals: /77   Pulse 86   Ht 1.651 m (5' 5\")   Wt 124.7 kg (275 lb)   SpO2 100%   BMI 45.76 kg/m    General: Cooperative, NAD  Neurologic:  Mental Status: Fully alert, attentive and oriented. Speech clear and fluent.   Cranial Nerves: Facial movements symmetric.   Motor: No abnormal movements.      Pertinent Investigations:          1/22/2024     5:17 PM 4/29/2024     6:09 PM 11/4/2024     8:16 PM   HIT-6   When you have headaches, how often is the pain severe 8  8  6    How often do headaches limit your ability to do usual daily activities including household work, work, school, or social activities? 6  6  6    When you have a headache, how often do you wish you could lie down? 8  8  8    In the past 4 weeks, how often have you felt too tired to do work or daily activities because of your headaches 6  6  8    In the past 4 weeks, how often have you felt fed up or irritated because of your headaches 8  6  6    In the past 4 weeks, how often did headaches limit your ability to concentrate on work or daily activities 8  8  8    HIT-6 Total Score 44 42 42        Patient-reported           1/22/2024     9:20 PM 4/29/2024     6:12 PM 11/4/2024     8:18 PM   MIDAS - in the past three months:   On how many days did you miss work or school because of your headaches? 0 0 0   How many days was your productivity at work or school reduced by half or more because of your headaches? 0 0 0   On how many days did you not do household work because of your headaches? 0 0 0   How many days was your productivity in household work reduced by half or more because of your headaches? 0 0 0   On how many days did you miss family, social, or leisure activities because of " your headaches? 0 0 0   On how many days did you have a headache? 0 1 1   On a scale of 0-10, on average how painful were these headaches? 3 4 8   MIDAS Score 0 (I - Little or No Disability) 0 (I - Little or No Disability) 0 (I - Little or No Disability)

## 2024-12-22 ENCOUNTER — HEALTH MAINTENANCE LETTER (OUTPATIENT)
Age: 44
End: 2024-12-22

## 2025-01-20 ENCOUNTER — HOSPITAL ENCOUNTER (OUTPATIENT)
Dept: MAMMOGRAPHY | Facility: CLINIC | Age: 45
Discharge: HOME | End: 2025-01-20
Attending: FAMILY MEDICINE | Admitting: FAMILY MEDICINE
Payer: COMMERCIAL

## 2025-01-20 DIAGNOSIS — Z12.31 VISIT FOR SCREENING MAMMOGRAM: ICD-10-CM

## 2025-01-20 PROCEDURE — 77063 BREAST TOMOSYNTHESIS BI: CPT

## 2025-01-20 PROCEDURE — 77067 SCR MAMMO BI INCL CAD: CPT

## 2025-07-08 DIAGNOSIS — E03.9 ACQUIRED HYPOTHYROIDISM: ICD-10-CM

## 2025-07-08 RX ORDER — LEVOTHYROXINE SODIUM 175 UG/1
175 TABLET ORAL DAILY
Qty: 30 TABLET | Refills: 0 | Status: SHIPPED | OUTPATIENT
Start: 2025-07-08

## 2025-07-08 NOTE — TELEPHONE ENCOUNTER
Med: levothyroxine    Last refill 4/4/25 #90, 0-R       LOV (related): 8/5/24    Last Lab: 6/21/24      Due for F/U around: due for TSH draw 6/2025, CPX?    Next Appt: No current future appointments scheduled at [facility] [place]         TSH   Date Value Ref Range Status   06/21/2024 2.09 0.30 - 4.20 uIU/mL Final   10/19/2020 0.49 0.40 - 4.00 mU/L Final     T4 Total   Date Value Ref Range Status   03/13/2012 4.6 4.5 - 12.0 ug/dL Final     T4 Free   Date Value Ref Range Status   11/15/2022 1.16 0.82 - 1.77 ng/dL Final     Free T4   Date Value Ref Range Status   06/10/2024 0.96 0.90 - 1.70 ng/dL Final

## 2025-07-21 ENCOUNTER — OFFICE VISIT (OUTPATIENT)
Dept: FAMILY MEDICINE | Facility: CLINIC | Age: 45
End: 2025-07-21

## 2025-07-21 VITALS
DIASTOLIC BLOOD PRESSURE: 73 MMHG | WEIGHT: 184.2 LBS | OXYGEN SATURATION: 100 % | BODY MASS INDEX: 30.69 KG/M2 | HEIGHT: 65 IN | HEART RATE: 60 BPM | SYSTOLIC BLOOD PRESSURE: 119 MMHG

## 2025-07-21 DIAGNOSIS — Z98.84 BARIATRIC SURGERY STATUS: Primary | ICD-10-CM

## 2025-07-21 DIAGNOSIS — E03.9 ACQUIRED HYPOTHYROIDISM: ICD-10-CM

## 2025-07-21 PROBLEM — E66.01 MORBID OBESITY (H): Status: RESOLVED | Noted: 2019-12-12 | Resolved: 2025-07-21

## 2025-07-21 LAB
T4 FREE SERPL-MCNC: 1.51 NG/DL (ref 0.9–1.7)
TSH SERPL DL<=0.005 MIU/L-ACNC: 0.01 UIU/ML (ref 0.3–4.2)

## 2025-07-21 PROCEDURE — 84443 ASSAY THYROID STIM HORMONE: CPT | Performed by: FAMILY MEDICINE

## 2025-07-21 PROCEDURE — 36415 COLL VENOUS BLD VENIPUNCTURE: CPT | Performed by: FAMILY MEDICINE

## 2025-07-21 PROCEDURE — G2211 COMPLEX E/M VISIT ADD ON: HCPCS | Performed by: FAMILY MEDICINE

## 2025-07-21 PROCEDURE — 84439 ASSAY OF FREE THYROXINE: CPT | Performed by: FAMILY MEDICINE

## 2025-07-21 PROCEDURE — 3074F SYST BP LT 130 MM HG: CPT | Performed by: FAMILY MEDICINE

## 2025-07-21 PROCEDURE — 3078F DIAST BP <80 MM HG: CPT | Performed by: FAMILY MEDICINE

## 2025-07-21 PROCEDURE — 99213 OFFICE O/P EST LOW 20 MIN: CPT | Performed by: FAMILY MEDICINE

## 2025-07-21 RX ORDER — BIOTIN 5 MG
5 TABLET ORAL DAILY
COMMUNITY

## 2025-07-21 RX ORDER — LEVOTHYROXINE SODIUM 175 UG/1
175 TABLET ORAL DAILY
Qty: 30 TABLET | Refills: 0 | Status: CANCELLED | OUTPATIENT
Start: 2025-07-21

## 2025-07-21 RX ORDER — MULTIVITAMIN
1 TABLET ORAL DAILY
COMMUNITY

## 2025-07-21 NOTE — PROGRESS NOTES
"Problem(s) Oriented visit        SUBJECTIVE:                                                    Paulina Diana is a 45 year old female who presents to clinic today for the following health issues :      1. Acquired hypothyroidism  Due for a check    2. Bariatric surgery status (Primary)  Seeing surgeon next month.      Problem list, Medication list, Allergies, and Medical/Social/Surgical histories reviewed in [facility] and updated as appropriate.   Additional history: as documented    ROS:  General and CV completed and negative except as noted above    Histories: reviewed    OBJECTIVE:                                                    /73   Pulse 60   Ht 1.646 m (5' 4.8\")   Wt 83.6 kg (184 lb 3.2 oz)   SpO2 100%   BMI 30.84 kg/m    Body mass index is 30.84 kg/m .   APPEARANCE: = Relaxed and in no distress  PERRLA/Irises = Pupils Round Reactive to Light and Irisis without inflammation  Resp effort = Calm regular breathing  Breath Sounds = Good air movement with no rales or rhonchi in any lung fields     ASSESSMENT/PLAN:                                                    Quality gaps reviewed    Paulina was seen today for recheck medication.    Diagnoses and all orders for this visit:    Bariatric surgery status    Acquired hypothyroidism  -     TSH with free T4 reflex; Future  -     VENOUS COLLECTION    Discussed signs and symptoms of hypo and hyperthyroidism.  Reviewed treatment options.   Recommended absolute medication compliance to avoid adding any additionial variance to the labs.      Regular exercise    Health maintenance items are reviewed in Epic and correct as of today:    Ismael Ribera MD  [facility]  Family Practice  [facility]  [phone]    For any issues my office # is [phone]        Answers submitted by the patient for this visit:  Hypothyroid  (Submitted on 7/17/2025)  Chief Complaint: Chronic problems general questions HPI Form  Since last visit, patient " describes the following symptoms:: Weight loss  Weight loss (Submitted on 7/17/2025)  Chief Complaint: Chronic problems general questions HPI Form  Weight loss: : 6-10 lbs.  General Questionnaire (Submitted on 7/17/2025)  Chief Complaint: Chronic problems general questions HPI Form  How many servings of fruits and vegetables do you eat daily?: 2-3  On average, how many sweetened beverages do you drink each day (Examples: soda, juice, sweet tea, etc.  Do NOT count diet or artificially sweetened beverages)?: 1  How many minutes a day do you exercise enough to make your heart beat faster?: 20 to 29  How many days a week do you exercise enough to make your heart beat faster?: 4  How many days per week do you miss taking your medication?: 0  Questionnaire about: Chronic problems general questions HPI Form (Submitted on 7/17/2025)  Chief Complaint: Chronic problems general questions HPI Form

## 2025-08-25 DIAGNOSIS — Z98.84 BARIATRIC SURGERY STATUS: Primary | ICD-10-CM

## (undated) DEVICE — WIPES FOLEY CARE SURESTEP PROVON DFC100

## (undated) DEVICE — SUCTION IRR STRYKERFLOW II W/TIP 250-070-520

## (undated) DEVICE — PREP SKIN SCRUB TRAY 4461A

## (undated) DEVICE — SU PDS II 2-0 CT-2 27"  Z333H

## (undated) DEVICE — SU VICRYL 0 UR-6 27" J603H

## (undated) DEVICE — SU MONOCRYL 4-0 PS-2 18" UND Y496G

## (undated) DEVICE — KIT ABDOMINAL HYST SMA15AHFSM

## (undated) DEVICE — HEMOSTAT ABSORBABLE ARISTA 5GM SM0007-USA

## (undated) DEVICE — DRSG STERI STRIP 1/2X4" R1547

## (undated) DEVICE — ESU PENCIL W/HOLSTER E2350H

## (undated) DEVICE — ESU LIGASURE LAPAROSCOPIC BLUNT TIP SEALER 5MMX37CM LF1837

## (undated) DEVICE — CATH TRAY FOLEY SURESTEP 16FR W/URINE MTR STATLK LF A303416A

## (undated) DEVICE — TROCAR TRIPORT PLUS OLYMPUS SINGLE ACCESS WA58010T

## (undated) DEVICE — ESU GROUND PAD UNIVERSAL W/O CORD

## (undated) DEVICE — LUBRICANT INST KIT ENDO-LUBE 220-90

## (undated) DEVICE — TROCAR TRIPORT 15 OLYMPUS SINGLE ACCESS WA58015T

## (undated) DEVICE — LINEN TOWEL PACK X5 5464

## (undated) DEVICE — SUCTION IRR TRUMPET VALVE LAP ASU1201

## (undated) RX ORDER — PROPOFOL 10 MG/ML
INJECTION, EMULSION INTRAVENOUS
Status: DISPENSED
Start: 2018-01-28

## (undated) RX ORDER — FENTANYL CITRATE 50 UG/ML
INJECTION, SOLUTION INTRAMUSCULAR; INTRAVENOUS
Status: DISPENSED
Start: 2018-01-28

## (undated) RX ORDER — LIDOCAINE HYDROCHLORIDE 10 MG/ML
INJECTION, SOLUTION EPIDURAL; INFILTRATION; INTRACAUDAL; PERINEURAL
Status: DISPENSED
Start: 2020-10-20

## (undated) RX ORDER — BUPIVACAINE HYDROCHLORIDE 5 MG/ML
INJECTION, SOLUTION EPIDURAL; INTRACAUDAL
Status: DISPENSED
Start: 2018-01-28

## (undated) RX ORDER — KETOROLAC TROMETHAMINE 15 MG/ML
INJECTION, SOLUTION INTRAMUSCULAR; INTRAVENOUS
Status: DISPENSED
Start: 2018-01-28

## (undated) RX ORDER — HYDROMORPHONE HYDROCHLORIDE 1 MG/ML
INJECTION, SOLUTION INTRAMUSCULAR; INTRAVENOUS; SUBCUTANEOUS
Status: DISPENSED
Start: 2018-01-28

## (undated) RX ORDER — GLYCOPYRROLATE 0.2 MG/ML
INJECTION, SOLUTION INTRAMUSCULAR; INTRAVENOUS
Status: DISPENSED
Start: 2018-01-28

## (undated) RX ORDER — NEOSTIGMINE METHYLSULFATE 1 MG/ML
VIAL (ML) INJECTION
Status: DISPENSED
Start: 2018-01-28